# Patient Record
Sex: FEMALE | Race: WHITE | NOT HISPANIC OR LATINO | Employment: OTHER | URBAN - METROPOLITAN AREA
[De-identification: names, ages, dates, MRNs, and addresses within clinical notes are randomized per-mention and may not be internally consistent; named-entity substitution may affect disease eponyms.]

---

## 2017-01-09 ENCOUNTER — OFFICE VISIT - RIVER FALLS (OUTPATIENT)
Dept: FAMILY MEDICINE | Facility: CLINIC | Age: 72
End: 2017-01-09

## 2017-01-09 ASSESSMENT — MIFFLIN-ST. JEOR: SCORE: 1206.08

## 2017-03-01 ENCOUNTER — OFFICE VISIT - RIVER FALLS (OUTPATIENT)
Dept: FAMILY MEDICINE | Facility: CLINIC | Age: 72
End: 2017-03-01

## 2017-04-26 ENCOUNTER — COMMUNICATION - RIVER FALLS (OUTPATIENT)
Dept: FAMILY MEDICINE | Facility: CLINIC | Age: 72
End: 2017-04-26

## 2017-04-26 ENCOUNTER — OFFICE VISIT - RIVER FALLS (OUTPATIENT)
Dept: FAMILY MEDICINE | Facility: CLINIC | Age: 72
End: 2017-04-26

## 2017-04-27 LAB — HBA1C MFR BLD: 8.7 %

## 2017-05-02 ENCOUNTER — OFFICE VISIT - RIVER FALLS (OUTPATIENT)
Dept: FAMILY MEDICINE | Facility: CLINIC | Age: 72
End: 2017-05-02

## 2017-05-19 ENCOUNTER — OFFICE VISIT - RIVER FALLS (OUTPATIENT)
Dept: FAMILY MEDICINE | Facility: CLINIC | Age: 72
End: 2017-05-19

## 2017-09-19 ENCOUNTER — AMBULATORY - RIVER FALLS (OUTPATIENT)
Dept: FAMILY MEDICINE | Facility: CLINIC | Age: 72
End: 2017-09-19

## 2017-09-20 LAB — HBA1C MFR BLD: 8.2 %

## 2017-09-26 ENCOUNTER — OFFICE VISIT - RIVER FALLS (OUTPATIENT)
Dept: FAMILY MEDICINE | Facility: CLINIC | Age: 72
End: 2017-09-26

## 2018-02-06 ENCOUNTER — AMBULATORY - RIVER FALLS (OUTPATIENT)
Dept: FAMILY MEDICINE | Facility: CLINIC | Age: 73
End: 2018-02-06

## 2018-02-15 ENCOUNTER — OFFICE VISIT - RIVER FALLS (OUTPATIENT)
Dept: FAMILY MEDICINE | Facility: CLINIC | Age: 73
End: 2018-02-15

## 2018-02-28 ENCOUNTER — OFFICE VISIT - RIVER FALLS (OUTPATIENT)
Dept: FAMILY MEDICINE | Facility: CLINIC | Age: 73
End: 2018-02-28

## 2018-02-28 ENCOUNTER — AMBULATORY - RIVER FALLS (OUTPATIENT)
Dept: FAMILY MEDICINE | Facility: CLINIC | Age: 73
End: 2018-02-28

## 2018-03-12 ENCOUNTER — OFFICE VISIT - RIVER FALLS (OUTPATIENT)
Dept: FAMILY MEDICINE | Facility: CLINIC | Age: 73
End: 2018-03-12

## 2018-03-12 ASSESSMENT — MIFFLIN-ST. JEOR: SCORE: 1202.61

## 2018-03-13 LAB
CREAT SERPL-MCNC: 1.57 MG/DL (ref 0.6–0.93)
GLUCOSE BLD-MCNC: 183 MG/DL (ref 65–99)

## 2018-06-13 ENCOUNTER — AMBULATORY - RIVER FALLS (OUTPATIENT)
Dept: FAMILY MEDICINE | Facility: CLINIC | Age: 73
End: 2018-06-13

## 2018-06-14 LAB — HBA1C MFR BLD: 8 %

## 2018-06-20 ENCOUNTER — OFFICE VISIT - RIVER FALLS (OUTPATIENT)
Dept: FAMILY MEDICINE | Facility: CLINIC | Age: 73
End: 2018-06-20

## 2018-07-10 ENCOUNTER — OFFICE VISIT - RIVER FALLS (OUTPATIENT)
Dept: FAMILY MEDICINE | Facility: CLINIC | Age: 73
End: 2018-07-10

## 2018-07-18 ENCOUNTER — OFFICE VISIT - RIVER FALLS (OUTPATIENT)
Dept: FAMILY MEDICINE | Facility: CLINIC | Age: 73
End: 2018-07-18

## 2018-07-18 ASSESSMENT — MIFFLIN-ST. JEOR: SCORE: 1200.63

## 2018-07-19 ENCOUNTER — OFFICE VISIT - RIVER FALLS (OUTPATIENT)
Dept: FAMILY MEDICINE | Facility: CLINIC | Age: 73
End: 2018-07-19

## 2018-08-02 ENCOUNTER — OFFICE VISIT - RIVER FALLS (OUTPATIENT)
Dept: FAMILY MEDICINE | Facility: CLINIC | Age: 73
End: 2018-08-02

## 2018-09-06 ENCOUNTER — OFFICE VISIT - RIVER FALLS (OUTPATIENT)
Dept: FAMILY MEDICINE | Facility: CLINIC | Age: 73
End: 2018-09-06

## 2018-10-08 ENCOUNTER — AMBULATORY - RIVER FALLS (OUTPATIENT)
Dept: FAMILY MEDICINE | Facility: CLINIC | Age: 73
End: 2018-10-08

## 2018-10-09 LAB — HBA1C MFR BLD: 7.9 %

## 2018-10-18 ENCOUNTER — OFFICE VISIT - RIVER FALLS (OUTPATIENT)
Dept: FAMILY MEDICINE | Facility: CLINIC | Age: 73
End: 2018-10-18

## 2018-11-23 ENCOUNTER — OFFICE VISIT - RIVER FALLS (OUTPATIENT)
Dept: FAMILY MEDICINE | Facility: CLINIC | Age: 73
End: 2018-11-23

## 2018-11-23 ASSESSMENT — MIFFLIN-ST. JEOR: SCORE: 1208.8

## 2019-01-15 ENCOUNTER — AMBULATORY - RIVER FALLS (OUTPATIENT)
Dept: FAMILY MEDICINE | Facility: CLINIC | Age: 74
End: 2019-01-15

## 2019-01-16 LAB
BUN SERPL-MCNC: 34 MG/DL (ref 7–25)
BUN/CREAT RATIO - HISTORICAL: 20 (ref 6–22)
CALCIUM SERPL-MCNC: 9.8 MG/DL (ref 8.6–10.4)
CHLORIDE BLD-SCNC: 108 MMOL/L (ref 98–110)
CHOLEST SERPL-MCNC: 181 MG/DL
CHOLEST/HDLC SERPL: 2.4 {RATIO}
CO2 SERPL-SCNC: 26 MMOL/L (ref 20–32)
CREAT SERPL-MCNC: 1.66 MG/DL (ref 0.6–0.93)
CREAT UR-MCNC: 143 MG/DL (ref 20–275)
EGFRCR SERPLBLD CKD-EPI 2021: 30 ML/MIN/1.73M2
GLUCOSE BLD-MCNC: 108 MG/DL (ref 65–99)
HBA1C MFR BLD: 8.3 %
HDLC SERPL-MCNC: 74 MG/DL
LDLC SERPL CALC-MCNC: 87 MG/DL
MICROALBUMIN UR-MCNC: 3.4 MG/DL
MICROALBUMIN/CREAT UR: 24 MG/G{CREAT}
NONHDLC SERPL-MCNC: 107 MG/DL
POTASSIUM BLD-SCNC: 4.6 MMOL/L (ref 3.5–5.3)
SODIUM SERPL-SCNC: 141 MMOL/L (ref 135–146)
TRIGL SERPL-MCNC: 105 MG/DL

## 2019-01-17 ENCOUNTER — OFFICE VISIT - RIVER FALLS (OUTPATIENT)
Dept: FAMILY MEDICINE | Facility: CLINIC | Age: 74
End: 2019-01-17

## 2019-01-22 ENCOUNTER — OFFICE VISIT - RIVER FALLS (OUTPATIENT)
Dept: FAMILY MEDICINE | Facility: CLINIC | Age: 74
End: 2019-01-22

## 2019-05-15 ENCOUNTER — AMBULATORY - RIVER FALLS (OUTPATIENT)
Dept: FAMILY MEDICINE | Facility: CLINIC | Age: 74
End: 2019-05-15

## 2019-05-16 LAB — HBA1C MFR BLD: 8.5 %

## 2019-05-28 ENCOUNTER — OFFICE VISIT - RIVER FALLS (OUTPATIENT)
Dept: FAMILY MEDICINE | Facility: CLINIC | Age: 74
End: 2019-05-28

## 2019-07-05 ENCOUNTER — COMMUNICATION - RIVER FALLS (OUTPATIENT)
Dept: FAMILY MEDICINE | Facility: CLINIC | Age: 74
End: 2019-07-05

## 2019-07-18 ENCOUNTER — OFFICE VISIT - RIVER FALLS (OUTPATIENT)
Dept: FAMILY MEDICINE | Facility: CLINIC | Age: 74
End: 2019-07-18

## 2019-07-26 ENCOUNTER — COMMUNICATION - RIVER FALLS (OUTPATIENT)
Dept: FAMILY MEDICINE | Facility: CLINIC | Age: 74
End: 2019-07-26

## 2019-09-13 ENCOUNTER — AMBULATORY - RIVER FALLS (OUTPATIENT)
Dept: FAMILY MEDICINE | Facility: CLINIC | Age: 74
End: 2019-09-13

## 2019-09-14 LAB — HBA1C MFR BLD: 8.1 %

## 2019-09-18 ENCOUNTER — OFFICE VISIT - RIVER FALLS (OUTPATIENT)
Dept: FAMILY MEDICINE | Facility: CLINIC | Age: 74
End: 2019-09-18

## 2019-09-18 ASSESSMENT — MIFFLIN-ST. JEOR: SCORE: 1176.59

## 2019-10-08 ENCOUNTER — OFFICE VISIT - RIVER FALLS (OUTPATIENT)
Dept: FAMILY MEDICINE | Facility: CLINIC | Age: 74
End: 2019-10-08

## 2019-10-21 ENCOUNTER — COMMUNICATION - RIVER FALLS (OUTPATIENT)
Dept: FAMILY MEDICINE | Facility: CLINIC | Age: 74
End: 2019-10-21

## 2019-12-10 ENCOUNTER — OFFICE VISIT - RIVER FALLS (OUTPATIENT)
Dept: FAMILY MEDICINE | Facility: CLINIC | Age: 74
End: 2019-12-10

## 2020-01-16 ENCOUNTER — OFFICE VISIT - RIVER FALLS (OUTPATIENT)
Dept: FAMILY MEDICINE | Facility: CLINIC | Age: 75
End: 2020-01-16

## 2020-02-04 ENCOUNTER — AMBULATORY - RIVER FALLS (OUTPATIENT)
Dept: FAMILY MEDICINE | Facility: CLINIC | Age: 75
End: 2020-02-04

## 2020-02-05 LAB — HBA1C MFR BLD: 9.6 %

## 2020-02-09 ENCOUNTER — COMMUNICATION - RIVER FALLS (OUTPATIENT)
Dept: FAMILY MEDICINE | Facility: CLINIC | Age: 75
End: 2020-02-09

## 2020-02-20 ENCOUNTER — OFFICE VISIT - RIVER FALLS (OUTPATIENT)
Dept: FAMILY MEDICINE | Facility: CLINIC | Age: 75
End: 2020-02-20

## 2020-02-20 ASSESSMENT — MIFFLIN-ST. JEOR: SCORE: 1192.92

## 2020-03-17 ENCOUNTER — OFFICE VISIT - RIVER FALLS (OUTPATIENT)
Dept: FAMILY MEDICINE | Facility: CLINIC | Age: 75
End: 2020-03-17

## 2020-03-17 ASSESSMENT — MIFFLIN-ST. JEOR: SCORE: 1187.48

## 2020-07-16 ENCOUNTER — AMBULATORY - RIVER FALLS (OUTPATIENT)
Dept: FAMILY MEDICINE | Facility: CLINIC | Age: 75
End: 2020-07-16

## 2020-07-17 LAB
BUN SERPL-MCNC: 24 MG/DL (ref 7–25)
BUN/CREAT RATIO - HISTORICAL: 16 (ref 6–22)
CALCIUM SERPL-MCNC: 10.1 MG/DL (ref 8.6–10.4)
CHLORIDE BLD-SCNC: 107 MMOL/L (ref 98–110)
CHOLEST SERPL-MCNC: 161 MG/DL
CHOLEST/HDLC SERPL: 2.5 {RATIO}
CO2 SERPL-SCNC: 28 MMOL/L (ref 20–32)
CREAT SERPL-MCNC: 1.47 MG/DL (ref 0.6–0.93)
CREAT UR-MCNC: 79 MG/DL (ref 20–275)
EGFRCR SERPLBLD CKD-EPI 2021: 34 ML/MIN/1.73M2
GLUCOSE BLD-MCNC: 96 MG/DL (ref 65–99)
HBA1C MFR BLD: 7.9 %
HDLC SERPL-MCNC: 64 MG/DL
LDLC SERPL CALC-MCNC: 80 MG/DL
MICROALBUMIN UR-MCNC: 2.4 MG/DL
MICROALBUMIN/CREAT UR: 30 MG/G{CREAT}
NONHDLC SERPL-MCNC: 97 MG/DL
POTASSIUM BLD-SCNC: 4.9 MMOL/L (ref 3.5–5.3)
SODIUM SERPL-SCNC: 141 MMOL/L (ref 135–146)
TRIGL SERPL-MCNC: 83 MG/DL

## 2020-07-22 ENCOUNTER — OFFICE VISIT - RIVER FALLS (OUTPATIENT)
Dept: FAMILY MEDICINE | Facility: CLINIC | Age: 75
End: 2020-07-22

## 2020-07-22 ASSESSMENT — MIFFLIN-ST. JEOR: SCORE: 1193.46

## 2021-01-26 ENCOUNTER — OFFICE VISIT - RIVER FALLS (OUTPATIENT)
Dept: FAMILY MEDICINE | Facility: CLINIC | Age: 76
End: 2021-01-26

## 2021-01-26 ENCOUNTER — COMMUNICATION - RIVER FALLS (OUTPATIENT)
Dept: FAMILY MEDICINE | Facility: CLINIC | Age: 76
End: 2021-01-26

## 2021-01-26 LAB
ANION GAP SERPL CALCULATED.3IONS-SCNC: 6 MMOL/L (ref 5–18)
BASOPHILS # BLD MANUAL: 0 10*3/UL
BASOPHILS NFR BLD MANUAL: 0.4 %
BUN SERPL-MCNC: 21 MG/DL (ref 8–25)
BUN/CREAT RATIO - HISTORICAL: 15 (ref 10–20)
CALCIUM SERPL-MCNC: 9.1 MG/DL (ref 8.5–10.5)
CHLORIDE BLD-SCNC: 111 MMOL/L (ref 98–110)
CO2 SERPL-SCNC: 26 MMOL/L (ref 21–31)
CREAT SERPL-MCNC: 1.43 MG/DL (ref 0.57–1.11)
EOSINOPHIL # BLD MANUAL: 0.3 10*3/UL
EOSINOPHIL NFR BLD MANUAL: 2.7 %
ERYTHROCYTE [DISTWIDTH] IN BLOOD BY AUTOMATED COUNT: 13.3 % (ref 11.5–15.5)
GFR ESTIMATE EXT - HISTORICAL: 36
GLUCOSE BLD-MCNC: 118 MG/DL (ref 65–100)
HCT VFR BLD AUTO: 31.8 % (ref 33–51)
HGB BLD-MCNC: 10.3 G/DL (ref 12–16)
LYMPHOCYTES # BLD MANUAL: 2.2 10*3/UL (ref 0.9–2.9)
LYMPHOCYTES NFR BLD MANUAL: 23.7 %
MCH RBC QN AUTO: 28.6 PG (ref 26–34)
MCHC RBC AUTO-ENTMCNC: 32.4 G/DL (ref 32–36)
MCV RBC AUTO: 88 FL (ref 80–100)
MONOCYTES # BLD MANUAL: 0.9 10*3/UL
MONOCYTES NFR BLD MANUAL: 9.6 %
NEUTROPHILS # BLD MANUAL: 5.9 10*3/UL (ref 1.7–7)
NEUTROPHILS NFR BLD MANUAL: 63.6 %
PLATELET # BLD AUTO: 213 10*3/UL (ref 140–440)
PMV BLD: 10.9 FL (ref 6.5–11)
POTASSIUM BLD-SCNC: 4.5 MMOL/L (ref 3.5–5)
RBC # BLD AUTO: 3.6 10*6/UL (ref 4–5.2)
SODIUM SERPL-SCNC: 143 MMOL/L (ref 135–145)
WBC # BLD AUTO: 9.3 10*3/UL (ref 4.5–11)

## 2021-01-28 LAB — SARS-COV-2 RNA RESP QL NAA+PROBE: NEGATIVE

## 2021-01-30 LAB
C REACTIVE PROTEIN LHE: 1.4 MG/L
PROCALCITONIN SERPL-MCNC: <0.1 NG/ML

## 2021-02-01 ENCOUNTER — COMMUNICATION - RIVER FALLS (OUTPATIENT)
Dept: FAMILY MEDICINE | Facility: CLINIC | Age: 76
End: 2021-02-01

## 2021-02-19 ENCOUNTER — OFFICE VISIT - RIVER FALLS (OUTPATIENT)
Dept: FAMILY MEDICINE | Facility: CLINIC | Age: 76
End: 2021-02-19

## 2021-02-20 LAB — SARS-COV-2 AB PNL SERPL IA: NEGATIVE

## 2021-02-23 ENCOUNTER — COMMUNICATION - RIVER FALLS (OUTPATIENT)
Dept: FAMILY MEDICINE | Facility: CLINIC | Age: 76
End: 2021-02-23

## 2021-03-23 ENCOUNTER — OFFICE VISIT - RIVER FALLS (OUTPATIENT)
Dept: FAMILY MEDICINE | Facility: CLINIC | Age: 76
End: 2021-03-23

## 2021-08-07 ENCOUNTER — AMBULATORY - RIVER FALLS (OUTPATIENT)
Dept: FAMILY MEDICINE | Facility: CLINIC | Age: 76
End: 2021-08-07

## 2021-08-08 LAB
CHOLEST SERPL-MCNC: 172 MG/DL
CHOLEST/HDLC SERPL: 2.9 {RATIO}
CREAT UR-MCNC: 121 MG/DL (ref 20–275)
HBA1C MFR BLD: 8.2 %
HDLC SERPL-MCNC: 60 MG/DL
LDLC SERPL CALC-MCNC: 91 MG/DL
MICROALBUMIN UR-MCNC: 3.4 MG/DL
MICROALBUMIN/CREAT UR: 28 MG/G{CREAT}
NONHDLC SERPL-MCNC: 112 MG/DL
TRIGL SERPL-MCNC: 118 MG/DL

## 2021-08-09 ENCOUNTER — COMMUNICATION - RIVER FALLS (OUTPATIENT)
Dept: FAMILY MEDICINE | Facility: CLINIC | Age: 76
End: 2021-08-09

## 2021-08-11 ENCOUNTER — OFFICE VISIT - RIVER FALLS (OUTPATIENT)
Dept: FAMILY MEDICINE | Facility: CLINIC | Age: 76
End: 2021-08-11

## 2021-10-26 ENCOUNTER — OFFICE VISIT - RIVER FALLS (OUTPATIENT)
Dept: FAMILY MEDICINE | Facility: CLINIC | Age: 76
End: 2021-10-26

## 2022-02-11 VITALS
TEMPERATURE: 97.6 F | SYSTOLIC BLOOD PRESSURE: 126 MMHG | WEIGHT: 170.4 LBS | BODY MASS INDEX: 31.93 KG/M2 | HEART RATE: 50 BPM | DIASTOLIC BLOOD PRESSURE: 60 MMHG | OXYGEN SATURATION: 96 %

## 2022-02-11 VITALS
TEMPERATURE: 97.7 F | BODY MASS INDEX: 30.84 KG/M2 | SYSTOLIC BLOOD PRESSURE: 146 MMHG | HEIGHT: 62 IN | DIASTOLIC BLOOD PRESSURE: 64 MMHG | HEART RATE: 60 BPM | WEIGHT: 167.6 LBS

## 2022-02-11 VITALS
SYSTOLIC BLOOD PRESSURE: 126 MMHG | BODY MASS INDEX: 30.77 KG/M2 | TEMPERATURE: 97 F | DIASTOLIC BLOOD PRESSURE: 60 MMHG | SYSTOLIC BLOOD PRESSURE: 140 MMHG | HEART RATE: 60 BPM | WEIGHT: 166.6 LBS | DIASTOLIC BLOOD PRESSURE: 60 MMHG | HEIGHT: 62 IN | WEIGHT: 167.2 LBS | BODY MASS INDEX: 30.22 KG/M2

## 2022-02-11 VITALS
BODY MASS INDEX: 30.99 KG/M2 | BODY MASS INDEX: 29.83 KG/M2 | WEIGHT: 164.4 LBS | BODY MASS INDEX: 29.83 KG/M2 | TEMPERATURE: 97.9 F | DIASTOLIC BLOOD PRESSURE: 71 MMHG | SYSTOLIC BLOOD PRESSURE: 150 MMHG | SYSTOLIC BLOOD PRESSURE: 140 MMHG | HEIGHT: 62 IN | HEART RATE: 50 BPM | HEART RATE: 88 BPM | HEART RATE: 65 BPM | TEMPERATURE: 97.1 F | DIASTOLIC BLOOD PRESSURE: 60 MMHG | WEIGHT: 168.4 LBS | SYSTOLIC BLOOD PRESSURE: 139 MMHG | WEIGHT: 164.4 LBS | DIASTOLIC BLOOD PRESSURE: 61 MMHG | TEMPERATURE: 97.1 F

## 2022-02-11 VITALS
TEMPERATURE: 97.6 F | BODY MASS INDEX: 31.1 KG/M2 | WEIGHT: 169.4 LBS | SYSTOLIC BLOOD PRESSURE: 138 MMHG | WEIGHT: 169 LBS | OXYGEN SATURATION: 97 % | SYSTOLIC BLOOD PRESSURE: 138 MMHG | DIASTOLIC BLOOD PRESSURE: 76 MMHG | HEART RATE: 56 BPM | HEART RATE: 51 BPM | BODY MASS INDEX: 30.73 KG/M2 | TEMPERATURE: 97.6 F | DIASTOLIC BLOOD PRESSURE: 52 MMHG | HEIGHT: 62 IN

## 2022-02-11 VITALS
BODY MASS INDEX: 31.68 KG/M2 | TEMPERATURE: 96.9 F | BODY MASS INDEX: 31.91 KG/M2 | HEIGHT: 61 IN | HEART RATE: 48 BPM | SYSTOLIC BLOOD PRESSURE: 132 MMHG | WEIGHT: 167.8 LBS | DIASTOLIC BLOOD PRESSURE: 52 MMHG | WEIGHT: 169 LBS | HEIGHT: 61 IN | OXYGEN SATURATION: 96 %

## 2022-02-11 VITALS
HEART RATE: 60 BPM | DIASTOLIC BLOOD PRESSURE: 60 MMHG | BODY MASS INDEX: 31.23 KG/M2 | SYSTOLIC BLOOD PRESSURE: 130 MMHG | HEIGHT: 61 IN | TEMPERATURE: 97.8 F | WEIGHT: 165.4 LBS

## 2022-02-11 VITALS
SYSTOLIC BLOOD PRESSURE: 134 MMHG | WEIGHT: 172.4 LBS | BODY MASS INDEX: 32.31 KG/M2 | DIASTOLIC BLOOD PRESSURE: 60 MMHG | HEART RATE: 59 BPM | TEMPERATURE: 96.3 F | OXYGEN SATURATION: 94 %

## 2022-02-11 VITALS
DIASTOLIC BLOOD PRESSURE: 60 MMHG | SYSTOLIC BLOOD PRESSURE: 122 MMHG | HEART RATE: 67 BPM | TEMPERATURE: 97.6 F | SYSTOLIC BLOOD PRESSURE: 136 MMHG | OXYGEN SATURATION: 95 % | BODY MASS INDEX: 29.97 KG/M2 | WEIGHT: 165.2 LBS | BODY MASS INDEX: 29.86 KG/M2 | TEMPERATURE: 97.4 F | DIASTOLIC BLOOD PRESSURE: 70 MMHG | HEART RATE: 60 BPM | WEIGHT: 164.6 LBS

## 2022-02-11 VITALS
WEIGHT: 170.6 LBS | DIASTOLIC BLOOD PRESSURE: 60 MMHG | SYSTOLIC BLOOD PRESSURE: 138 MMHG | TEMPERATURE: 97 F | HEART RATE: 60 BPM

## 2022-02-11 VITALS — DIASTOLIC BLOOD PRESSURE: 60 MMHG | TEMPERATURE: 97.3 F | HEART RATE: 56 BPM | SYSTOLIC BLOOD PRESSURE: 122 MMHG

## 2022-02-11 VITALS
TEMPERATURE: 96.1 F | DIASTOLIC BLOOD PRESSURE: 56 MMHG | HEART RATE: 56 BPM | BODY MASS INDEX: 30.41 KG/M2 | SYSTOLIC BLOOD PRESSURE: 136 MMHG | WEIGHT: 167.6 LBS

## 2022-02-11 VITALS
BODY MASS INDEX: 31.35 KG/M2 | DIASTOLIC BLOOD PRESSURE: 80 MMHG | HEART RATE: 64 BPM | SYSTOLIC BLOOD PRESSURE: 151 MMHG | WEIGHT: 167.3 LBS | TEMPERATURE: 97.5 F

## 2022-02-11 VITALS
DIASTOLIC BLOOD PRESSURE: 60 MMHG | SYSTOLIC BLOOD PRESSURE: 126 MMHG | TEMPERATURE: 97.3 F | BODY MASS INDEX: 30.83 KG/M2 | WEIGHT: 170 LBS | HEART RATE: 60 BPM

## 2022-02-11 VITALS
WEIGHT: 169.12 LBS | BODY MASS INDEX: 31.93 KG/M2 | HEART RATE: 56 BPM | DIASTOLIC BLOOD PRESSURE: 64 MMHG | TEMPERATURE: 96 F | HEIGHT: 61 IN | SYSTOLIC BLOOD PRESSURE: 136 MMHG

## 2022-02-12 ENCOUNTER — AMBULATORY - RIVER FALLS (OUTPATIENT)
Dept: FAMILY MEDICINE | Facility: CLINIC | Age: 77
End: 2022-02-12
Payer: MEDICARE

## 2022-02-13 LAB
BUN SERPL-MCNC: 29 MG/DL (ref 7–25)
BUN/CREAT RATIO - HISTORICAL: 16 (ref 6–22)
CALCIUM SERPL-MCNC: 9.8 MG/DL (ref 8.6–10.4)
CHLORIDE BLD-SCNC: 106 MMOL/L (ref 98–110)
CO2 SERPL-SCNC: 28 MMOL/L (ref 20–32)
CREAT SERPL-MCNC: 1.78 MG/DL (ref 0.6–0.93)
EGFRCR SERPLBLD CKD-EPI 2021: 27 ML/MIN/1.73M2
ERYTHROCYTE [DISTWIDTH] IN BLOOD BY AUTOMATED COUNT: 12.3 % (ref 11–15)
GLUCOSE BLD-MCNC: 153 MG/DL (ref 65–99)
HBA1C MFR BLD: 8.2 %
HCT VFR BLD AUTO: 34.3 % (ref 35–45)
HGB BLD-MCNC: 11.1 GM/DL (ref 11.7–15.5)
MCH RBC QN AUTO: 28 PG (ref 27–33)
MCHC RBC AUTO-ENTMCNC: 32.4 GM/DL (ref 32–36)
MCV RBC AUTO: 86.4 FL (ref 80–100)
PLATELET # BLD AUTO: 236 10*3/UL (ref 140–400)
PMV BLD: 11.8 FL (ref 7.5–12.5)
POTASSIUM BLD-SCNC: 4.6 MMOL/L (ref 3.5–5.3)
RBC # BLD AUTO: 3.97 10*6/UL (ref 3.8–5.1)
SODIUM SERPL-SCNC: 140 MMOL/L (ref 135–146)
WBC # BLD AUTO: 7.6 10*3/UL (ref 3.8–10.8)

## 2022-02-16 ENCOUNTER — COMMUNICATION - RIVER FALLS (OUTPATIENT)
Dept: FAMILY MEDICINE | Facility: CLINIC | Age: 77
End: 2022-02-16
Payer: MEDICARE

## 2022-02-16 NOTE — NURSING NOTE
Comprehensive Intake Entered On:  1/26/2021 1:49 PM CST    Performed On:  1/26/2021 1:45 PM CST by Paula Concepcion CMA               Summary   Chief Complaint :   right ear pain, pains right side of head and into her neck/glands - painful to touch, fatigued, SOB, sl cough x 1/18    Weight Measured :   172.4 lb(Converted to: 172 lb 6 oz, 78.199 kg)    Systolic Blood Pressure :   134 mmHg (HI)    Diastolic Blood Pressure :   60 mmHg   Mean Arterial Pressure :   85 mmHg   Peripheral Pulse Rate :   59 bpm (LOW)    Temperature Tympanic :   96.3 DegF(Converted to: 35.7 DegC)  (LOW)    Oxygen Saturation :   94 %   Race :      Languages :   English   Paula Concepcion CMA - 1/26/2021 1:45 PM CST   Health Status   Allergies Verified? :   Yes   Medication History Verified? :   Yes   Medical History Verified? :   Yes   Pre-Visit Planning Status :   Completed   Tobacco Use? :   Former smoker   Paula Concepcion CMA - 1/26/2021 1:45 PM CST   ID Risk Screen   Recent Travel History :   No recent travel   Family Member Travel History :   No recent travel   Other Exposure to Infectious Disease :   Unknown   COVID-19 Testing Status :   No COVID-19 test performed   Paula Concepcion CMA - 1/26/2021 1:45 PM CST   Social History   Social History   (As Of: 1/26/2021 1:49:46 PM CST)   Alcohol:        Never   (Last Updated: 1/24/2019 5:51:28 PM CST by Kathryn Worrell)          Tobacco:        Past   (Last Updated: 4/21/2010 2:07:14 PM CDT by Hoda Fortune CMA)    Comments:  9/25/2012 1:41 PM - Brooke Barraza: Quit in 2000   (Last Updated: 9/25/2012 1:41:36 PM CDT by Brooke Barraza)   Former smoker, quit more than 30 days ago   (Last Updated: 1/26/2021 1:45:59 PM CST by Paula Concepcion CMA)          Electronic Cigarette/Vaping:        Electronic Cigarette Use: Never.   (Last Updated: 1/26/2021 1:46:04 PM CST by Paula Concepcion CMA)          Employment/School:        Retired, Work/School description: [employer].   (Last Updated: 7/2/2010  10:15:57 AM CDT by Joseluis STINSON, Jo

## 2022-02-16 NOTE — PROGRESS NOTES
Patient:   ALEJANDRA HERRERA            MRN: [id number]            FIN: [id number]               Age:   73 years     Sex:  Female     :  1945   Associated Diagnoses:   Obesity; Diabetes Mellitus, Type 2; Background Diabetic Retinopathy; Arteriosclerotic Heart Disease (ASHD); Hyperlipemia   Author:   Edgardo Luong MD      Visit Information      Date of Service: 2019 02:17 pm  Performing Location: [facility]  Encounter#: [id number]      Primary Care Provider (PCP):  Edgardo Luong MD, NPI# [id number]      Referring Provider:  Edgardo Luong MD# [id number]      Chief Complaint   2019 2:26 PM CST    DM check              Additional Information:No additional information recorded during visit.   Chief complaint and symptoms as noted above and confirmed with patient      History of Present Illness   2014: Alejandra presents to clinic to [facility] care, previously followed by JEB.  She has a history of type 2 diabetes historically with uncontrolled hyperglycemia with hemoglobin A1c consistently above 9%, until approximately 1-1/2 years ago she was transitioned to Lantus basal and Lispro bolus insulin dosing with significant improvement in her glycemic control. Currently taking Lantus 40 units nightly, Humalog 15 units 3 times a day before meals.  She has a history of retinopathy, no known peripheral neuropathy or nephropathy.  She has remote history of atherosclerotic coronary artery disease, undergoing angioplasty in the mid 90s.  Denies any anginal equivalent symptoms.  She has never undergone screening for colorectal cancer. Labs reviewed with her.     2019:  Presents for general diabetic follow-up.  Overall doing well.  No specific complaints or concerns.  Tolerating insulin therapy without issues.  Denies any regular glycemia.         Review of Systems   Constitutional:  No fever, No chills.    Eye:  Negative except as documented in history of present illness.    Ear/Nose/Mouth/Throat:   Negative except as documented in history of present illness.    Respiratory:  No shortness of breath.    Cardiovascular:  No chest pain, No palpitations, No peripheral edema, No syncope.    Gastrointestinal:  No vomiting, No constipation, No heartburn, No abdominal pain.    Genitourinary:  No dysuria, No hematuria.    Hematology/Lymphatics:  Negative except as documented in history of present illness.    Endocrine:  No excessive thirst, No polyuria.    Immunologic:  No recurrent fevers.    Musculoskeletal:  Decreased range of motion, No back pain, No neck pain, No joint pain, No muscle pain.    Neurologic:  Alert and oriented X4, No numbness, No tingling.       Health Status   Allergies:    Allergic Reactions (Selected)  Severity Not Documented  Actos (Gi upset,edema)  Avalide (Angioedema)  Avandia (Breathing problem)  Avelox (Shaky)  Benadryl (Hives)  Glucophage (Gi sx)  Lisinopril (Angioedema)  Naprosyn (Hives)  Penicillin (Hives)  PredniSONE (Hives)  Sulfa drug (Hives and nausea and vomitting)  Tylenol (Feet swelling)  Zithromax (Breathing trouble)  Nonallergic Reactions (Selected)  Unknown  Hydrochlorothiazide-irbesartan (Angioedema)  Ibuprofen (No reactions were documented)  Peanuts (Itching)   Medications:  (Selected)   Prescriptions  Prescribed  1/2 cc insulin syringes (uses 2 daily) and One touch test strips and lancets: 1/2 cc insulin syringes (uses 2 daily) and One touch test strips and lancets, See Instructions, Instructions: uses 2 daily, Supply, # 100 EA, 5 Refill(s), Type: Maintenance, Pharmacy: [id number] #[id number], uses 2 daily  NovoLOG FlexPen 100 units/mL injectable solution: See Instructions, Instructions: INJECT 15 UNITS SUBCUTANEOUSLY THREE TIMES DAILY BEFORE MEALS, # 45 mL, 3 Refill(s), Type: Soft Stop, Pharmacy: [id number], keep on file, INJECT 15 UNITS SUBCUTANEOUSLY THREE TIMES DAILY BEFORE MEALS  ONE TOUCH ULTRA TEST STRIPS: ONE TOUCH ULTRA TEST STRIPS, See Instructions,  Instructions: TESTING qid - E11.9, Supply, # 400 EA, 3 Refill(s), Type: Maintenance, Pharmacy: [facility], keep on file and pt will notify when needed, TESTING qid - E11.9  One Touch Ultra Blood glucose meter: One Touch Ultra Blood glucose meter, See Instructions, Instructions: use as directed - E11.9, Supply, # 1 EA, 0 Refill(s), Type: Maintenance, Pharmacy: [facility], use as directed - E11.9  Toujeo SoloStar 300 units/mL subcutaneous solution: See Instructions, Instructions: INJECT 35 UNITS SUBCUTANEOUSLY ONCE DAILY. MAY INCREASE 1 UNIT UNTIL SUGAR IS BELOW 130, # 13.5 mL, 3 Refill(s), Type: Soft Stop, Pharmacy: [place] [id number], INJECT 35 UNITS SUBCUTANEOUSLY ONCE DAILY. MAY INCREASE...  amLODIPine 5 mg oral tablet: = 1 tab(s), PO, Daily, # 90 tab(s), 3 Refill(s), Type: Maintenance, Pharmacy: [facility] #[id number], keep on file and pt will notify when needed, 1 tab(s) Oral daily  aspirin 325 mg oral tablet: 1 tab(s) ( 325 mg ), PO, Daily, # 30 tab(s), 0 Refill(s), Type: Maintenance, OTC (Rx)  atenolol-chlorthalidone 50 mg-25 mg oral tablet: 0.5 tab, PO, Daily, # 45 tab(s), 3 Refill(s), Type: Maintenance, Pharmacy: [facility] #[id number], keep on file and pt will notify when needed, 0.5 tab Oral daily  atorvastatin 40 mg oral tablet: = 1 tab(s) ( 40 mg ), po, daily, # 90 tab(s), 3 Refill(s), Type: Maintenance, Pharmacy: [facility] #[id number], keep on file and pt will notify when needed, 1 tab(s) Oral daily  nitroglycerin 0.4 mg sublingual tablet: = 1 tab(s), SL, q5min, PRN: for chest pain, # 25 tab(s), 1 Refill(s), Type: Maintenance, Pharmacy: [facility] #[id number], keep on hold and pt will notify when needed, 1 tab(s) SL q5 min,PRN:for chest pain  pantoprazole 40 mg oral delayed release tablet: = 1 tab(s) ( 40 mg ), po, daily, # 90 tab(s), 3 Refill(s), Type: Maintenance, Pharmacy: [facility] #[id number], keep on file and pt will notify when needed, 1 tab(s) Oral daily  short insulin pen  needles: short insulin pen needles, See Instructions, Instructions: insulin shots 4x/day, Supply, # 400 EA, 3 Refill(s), Type: Maintenance, Pharmacy: [facility] #[id number], keep on hold and pt will notify when needed, insulin shots 4x/day,    Medications          *denotes recorded medication          short insulin pen needles: See Instructions, insulin shots 4x/day, 400 EA, 3 Refill(s).          ONE TOUCH ULTRA TEST STRIPS: See Instructions, TESTING qid - E11.9, 400 EA, 3 Refill(s).          1/2 cc insulin syringes (uses 2 daily) and One touch test strips and lancets: See Instructions, uses 2 daily, 100 EA.          One Touch Ultra Blood glucose meter: See Instructions, use as directed - E11.9, 1 EA, 0 Refill(s).          amLODIPine 5 mg oral tablet: 1 tab(s), PO, Daily, 90 tab(s), 3 Refill(s).          aspirin 325 mg oral tablet: 325 mg, 1 tab(s), PO, Daily, 30 tab(s).          atenolol-chlorthalidone 50 mg-25 mg oral tablet: 0.5 tab, PO, Daily, 45 tab(s), 3 Refill(s).          atorvastatin 40 mg oral tablet: 40 mg, 1 tab(s), po, daily, 90 tab(s), 3 Refill(s).          NovoLOG FlexPen 100 units/mL injectable solution: See Instructions, INJECT 15 UNITS SUBCUTANEOUSLY THREE TIMES DAILY BEFORE MEALS, 45 mL, 3 Refill(s).          Toujeo SoloStar 300 units/mL subcutaneous solution: See Instructions, INJECT 35 UNITS SUBCUTANEOUSLY ONCE DAILY. MAY INCREASE 1 UNIT UNTIL SUGAR IS BELOW 130, 13.5 mL, 3 Refill(s).          nitroglycerin 0.4 mg sublingual tablet: 1 tab(s), SL, q5min, PRN: for chest pain, 25 tab(s), 1 Refill(s).          pantoprazole 40 mg oral delayed release tablet: 40 mg, 1 tab(s), po, daily, 90 tab(s), 3 Refill(s).     Problem list:    All Problems  Acute low back pain / SNOMED CT 601601843 / Confirmed  Arteriosclerotic heart disease (ASHD) / SNOMED CT 69927446 / Confirmed  GERD (gastroesophageal reflux disease) / SNOMED CT 356176839 / Confirmed  Glaucoma / SNOMED CT 66226116 / Confirmed  Hypertension /  SNOMED CT 4101636674 / Confirmed  Insulin long-term use / SNOMED CT 9533010241 / Confirmed  Hyperlipemia / SNOMED CT 480613008 / Confirmed  Background diabetic retinopathy / SNOMED CT 5704576749 / Confirmed  Obesity / SNOMED CT 4306484276 / Probable  Diabetes mellitus, type 2 / SNOMED CT 382481755 / Confirmed  Inactive: Tobacco user / ICD-9-.1  Resolved: *Hospitalized@[facility] - Acute low back pain, L4 nerve root impingement  Canceled: Long term current use of oral hypoglycemic drug / SNOMED CT 327712287  Canceled: Obese / ICD-9-.00      Histories   Past Medical History:    Active  Diabetes mellitus, type 2 (769175673): Onset in  at 51 years.  Comments:  2010 CST 3:00 PM CST Adwoa Concepcion CMAPaula  started Insulin 2002  Background diabetic retinopathy (4190308439)  Arteriosclerotic heart disease (ASHD) (33539392)  Comments:  2010 CST 3:02 PM CST Adwoa Concepcion CMA Paula  Coronary Angioplasty and Stents x 2    2010 CDT 2:14 PM CDT - Lew Huggins MD  &   GERD (gastroesophageal reflux disease) (986010556)  Hypertension (7241443750)  Hyperlipemia (953708158)  Acute low back pain (700711035)  Comments:  2012 CDT 10:46 AM CDT - Lew Huggins MD  L3-L4 disc with L4 nerve root impingement  Obesity (5445277231)  Glaucoma (50765307)  Comments:  2012 CDT 1:46 PM CDT - Brooke Barraza  Right eye  Resolved  *Hospitalized@[facility] - Acute low back pain, L4 nerve root impingement: Onset on 2012 at 66 years.  Resolved.   Family History:    Suicide  Father ()  Kidney stone  Son (Elliot)  Diabetes mellitus type II  Mother ()  Asthma  Sister (Shira)  Heart attack  Mother ()  Son (Terrence)  CABG - Coronary artery bypass graft  Son (Terrence)  Coronary heart disease  Mother ()     Procedure history:    Esophagogastroduodenoscopy (740897781) on 2017 at 71 Years.  Comments:  2/10/2017 12:02 PM Elliot Park MDory  Gastric ulcer  Coronary angiogram (09111199) in  the month of 4/2010 at 64 Years.  Comments:  7/2/2010 10:18 AM CDT - Lew Huggins MD  no intervention with stents  OD cataract with IOL in the month of 12/2006 at 61 Years.  glaucoma surgery OD in the month of 3/2006 at 60 Years.  coronary angioplasty with stents in 2000 at 55 Years.  Coronary angioplasty (46292306) in 1997 at 52 Years.  DEE - Total abdominal hysterectomy and bilateral salpingo-oophorectomy (8751902382) in 1975 at 30 Years.  Comments:  4/21/2010 1:59 PM DERRICKT - Hoda Fortune  benign fibroids  Cholecystectomy (35609984) in 1968 at 23 Years.  Appendectomy (068676315) in 1960 at 15 Years.  Tonsillectomy (768511864) in 1956 at 11 Years.  Dilatation and curettage (1102421676).  Comments:  4/21/2010 1:59 PM DERRICKT - Hoda Fortune  benign   Social History:        Alcohol Assessment            Never      Tobacco Assessment            Past                     Comments:                      09/25/2012 - Brooke Barraza                     Quit in 2000      Employment and Education Assessment            Retired, Work/School description: [employer].      Physical Examination   vital signs stable, as noted above   Vital Signs   1/22/2019 2:26 PM CST Temperature Tympanic 97.3 DegF  LOW    Peripheral Pulse Rate 60 bpm    Pulse Site Radial artery    Systolic Blood Pressure 126 mmHg    Diastolic Blood Pressure 60 mmHg    Mean Arterial Pressure 82 mmHg    BP Site Right arm      Measurements from flowsheet : Measurements   1/22/2019 2:26 PM CST    Weight Measured - Standard                170 lb     General:  Alert and oriented, No acute distress.    Eye:  Pupils are equal, round and reactive to light, Extraocular movements are intact, Normal conjunctiva.    HENT:  Normocephalic, Oral mucosa is moist.    Neck:  Supple, No carotid bruit, No jugular venous distention, No lymphadenopathy.    Respiratory:  Lungs are clear to auscultation, Respirations are non-labored.    Cardiovascular:  Normal rate, Regular rhythm, No  murmur, No edema.    Gastrointestinal:  Soft, Non-tender.    Musculoskeletal:  Normal strength, left with finger trigger finger.    Neurologic:  Alert, Oriented, Normal motor function, No focal deficits, Cranial Nerves II-XII are grossly intact.    Cognition and Speech:  Oriented, Speech clear and coherent, Functional cognition intact.    Psychiatric:  Appropriate mood & affect.       Review / Management   Results review:  Lab results   1/15/2019 8:15 AM CST Sodium Level 141 mmol/L    Potassium Level 4.6 mmol/L    Chloride Level 108 mmol/L    CO2 Level 26 mmol/L    Glucose Level 108 mg/dL  HI    BUN 34 mg/dL  HI    Creatinine 1.66 mg/dL  HI    BUN/Creat Ratio 20    eGFR 30 mL/min/1.73m2  LOW    eGFR African American 35 mL/min/1.73m2  LOW    Calcium Level 9.8 mg/dL    Hgb A1c 8.3  HI    Cholesterol 181 mg/dL    Non-    HDL 74 mg/dL    Chol/HDL Ratio 2.4    LDL 87    Triglyceride 105 mg/dL    U Microalbumin 3.4 mg/dL    Ur Creatinine 143 mg/dL    Ur Microalb/Creat Ratio 24   .       Impression and Plan   Diagnosis     Obesity (CMS09-MW E66.9).     Diabetes Mellitus, Type 2 (WPA49-JC E11.9).     Background Diabetic Retinopathy (KVM79-PH E11.311).     Arteriosclerotic Heart Disease (ASHD) (YEB80-OV I25.10).     Hyperlipemia (CIB98-RD E78.2).         .) CAD   - Lexiscan (12/2016) - no reversible defects   - previously intolerant to higher dose ISMN, she does have known atherostenotic disease involving LCx   - ASA, atenolol 25mg daily, atorvastatin 40mg QHS,    - lower threshold for PCI given historic intolerance to long-acting nitrates    .) GERD/esophagitis   - EGD in 12/2016 showing gastric ulcer - previous symptoms now resolved   - pantoprazole 40mg daily   - denies any NSAIDs    .) type II DM, uncontrolled  hypoglycemic medications: intolerant to metformin due to GI symptoms  insulin therapy: Toujeo 40 units, Humalog 12-15 units TID + SSI   - flowsheet for ICR 1:4, sensitivity 15 (~20 units q meal)   - could  consider future GLP1 agonist  last HbA1c: 8.3%   microvascular complications: retinopathy, mo microalbuminuria  macrovascular complications: CAD  ASA/GILDA/statin:  ASA 325mg daily, no microalbuminuira, atorvastatin 40mg    - previous myalgias on higher dose of statin    .) HTN, controlled  current antihypertensive regimen: atenolol/chlorthalidone 25/12.5mg, amlodipine 5mg,   regimen changes:  intolerance:  future titration/work-up plan: low threshold to change/add ACEI if development of microalbuminuria    .) Health maintenance   - declines any means of CRC screening   - DEXA '18: osteopenia   - prior DEE   - q2 yr mammography   - declines adult vaccinations except for pneumococcal series    RTC in 6 months

## 2022-02-16 NOTE — NURSING NOTE
Diabetes Eye Testing Entered On:  1/17/2020 8:31 AM CST    Performed On:  1/2/2020 8:31 AM CST by Monie Almazan CMA               Diabetes Eye Testing   Retinopathy Present TR :   No   Presence of Macular Edema TR :   Yes   Dilated Retinal Exam Date TR :   1/2/2020 CST   Monie Almazan CMA - 1/17/2020 8:31 AM CST

## 2022-02-16 NOTE — NURSING NOTE
Comprehensive Intake Entered On:  2/20/2020 1:03 PM CST    Performed On:  2/20/2020 12:57 PM CST by Prieto GARCIA, Korin               Summary   Chief Complaint :   DM check, review meds, needs refills. also c/o chest congestion/tightness, some cough, SOB at times xSaturday   Weight Measured :   169 lb(Converted to: 169 lb 0 oz, 76.66 kg)    Height Measured :   61.25 in(Converted to: 5 ft 1 in, 155.57 cm)    Body Mass Index :   31.67 kg/m2 (HI)    Body Surface Area :   1.82 m2   Systolic Blood Pressure :   132 mmHg (HI)    Diastolic Blood Pressure :   52 mmHg (LOW)    Mean Arterial Pressure :   79 mmHg   Peripheral Pulse Rate :   48 bpm (LOW)    BP Site :   Right arm   Pulse Site :   Radial artery   BP Method :   Manual   HR Method :   Manual   Temperature Tympanic :   96.9 DegF(Converted to: 36.1 DegC)  (LOW)    Oxygen Saturation :   96 %   Race :      Languages :   English   Prieto GARCIA, Korin - 2/20/2020 12:57 PM CST   Health Status   Allergies Verified? :   Yes   Medication History Verified? :   Yes   Medical History Verified? :   Yes   Pre-Visit Planning Status :   Completed   Tobacco Use? :   Former smoker   Prieto GARCIA, Korin - 2/20/2020 12:57 PM CST   Consents   Consent for Immunization Exchange :   Consent Granted   Consent for Immunizations to Providers :   Consent Granted   Prieto GARCIA Korin - 2/20/2020 12:57 PM CST   Meds / Allergies   (As Of: 2/20/2020 1:03:50 PM CST)   Allergies (Active)   Actos  Estimated Onset Date:   Unspecified ; Reactions:   GI upset,edema ; Created By:   Paula Concepcion; Reaction Status:   Active ; Substance:   Actos ; Updated By:   Paula Concepcion; Reviewed Date:   3/1/2017 4:47 PM CST      Avalide  Estimated Onset Date:   Unspecified ; Reactions:   angioedema ; Created By:   Paula Concepcion; Reaction Status:   Active ; Substance:   Avalide ; Updated By:   Paula Concepcion; Reviewed Date:   3/1/2017 4:47 PM CST      Avandia  Estimated Onset Date:   Unspecified ; Reactions:    breathing problem ; Created By:   Paula Concepcion; Reaction Status:   Active ; Substance:   Avandia ; Updated By:   Paula Concepcion; Reviewed Date:   3/1/2017 4:47 PM CST      Avelox  Estimated Onset Date:   Unspecified ; Reactions:   shaky ; Created By:   Paula Concepcion; Reaction Status:   Active ; Substance:   Avelox ; Updated By:   Paula Concepcion; Reviewed Date:   3/1/2017 4:47 PM CST      Benadryl  Estimated Onset Date:   Unspecified ; Reactions:   hives ; Created By:   Paula Concepcion; Reaction Status:   Active ; Substance:   Benadryl ; Updated By:   Paula Concepcion; Reviewed Date:   3/1/2017 4:47 PM CST      Glucophage  Estimated Onset Date:   Unspecified ; Reactions:   GI sx ; Created By:   Paula Concepcion; Reaction Status:   Active ; Substance:   Glucophage ; Updated By:   Paula Concepcion; Reviewed Date:   3/1/2017 4:47 PM CST      hydrochlorothiazide-irbesartan  Estimated Onset Date:   2/5/2017 ; Reactions:   Angioedema ; Created By:   Paula Concepcion CMA; Reaction Status:   Active ; Category:   Drug ; Substance:   hydrochlorothiazide-irbesartan ; Type:   <not entered> ; Severity:   Unknown ; Updated By:   Palua Concepcion CMA; Reviewed Date:   1/22/2019 4:55 PM CST      ibuprofen  Estimated Onset Date:   <not entered> 9/20/2008 ; Created By:   Paula Concepcion CMA; Reaction Status:   Active ; Category:   Drug ; Substance:   ibuprofen ; Type:   <not entered> ; Severity:   Unknown ; Updated By:   Paula Concepcion CMA; Reviewed Date:   1/22/2019 4:55 PM CST      lisinopril  Estimated Onset Date:   Unspecified ; Reactions:   angioedema ; Created By:   Paula Concepcion; Reaction Status:   Active ; Substance:   lisinopril ; Updated By:   Paula Concepcion; Reviewed Date:   3/1/2017 4:47 PM CST      Naprosyn  Estimated Onset Date:   Unspecified ; Reactions:   hives ; Created By:   Paula Concepcion; Reaction Status:   Active ; Substance:   Naprosyn ; Updated By:   Paula Concepcion; Reviewed Date:   3/1/2017 4:47 PM CST      Peanuts   Estimated Onset Date:   <not entered> 9/8/2014 ; Reactions:   Itching ; Created By:   Paula Concepcion CMA; Reaction Status:   Active ; Category:   <not entered> ; Substance:   Peanuts ; Type:   <not entered> ; Severity:   Unknown ; Updated By:   Paula Concepcion CMA; Reviewed Date:   1/22/2019 4:56 PM CST      penicillin  Estimated Onset Date:   Unspecified ; Reactions:   hives ; Created By:   Paula Concepcion; Reaction Status:   Active ; Substance:   penicillin ; Updated By:   Paula Concepcion; Reviewed Date:   3/1/2017 4:47 PM CST      predniSONE  Estimated Onset Date:   Unspecified ; Reactions:   hives ; Created By:   Paula Concepcion; Reaction Status:   Active ; Substance:   predniSONE ; Updated By:   Paula Concepcion; Reviewed Date:   3/1/2017 4:47 PM CST      sulfa drug  Estimated Onset Date:   Unspecified ; Reactions:   hives, nausea and vomitting ; Created By:   Paula Concepcion CMA; Reaction Status:   Active ; Substance:   sulfa drug ; Updated By:   Paula Concepcion CMA; Reviewed Date:   1/22/2019 4:57 PM CST      Tylenol  Estimated Onset Date:   Unspecified ; Reactions:   feet swelling ; Created By:   Paula Concepcion; Reaction Status:   Active ; Category:   Drug ; Substance:   Tylenol ; Type:   Allergy ; Updated By:   Paula Concepcion; Reviewed Date:   3/1/2017 4:47 PM CST      Zithromax  Estimated Onset Date:   Unspecified ; Reactions:   breathing trouble ; Created By:   Paula Concepcion; Reaction Status:   Active ; Substance:   Zithromax ; Updated By:   Paula Concepcion; Reviewed Date:   3/1/2017 4:47 PM CST        Medication List   (As Of: 2/20/2020 1:03:50 PM CST)   Prescription/Discharge Order    amLODIPine  :   amLODIPine ; Status:   Prescribed ; Ordered As Mnemonic:   amLODIPine 5 mg oral tablet ; Simple Display Line:   1 tab(s), Oral, daily, 90 tab(s), 0 Refill(s) ; Ordering Provider:   Edgardo Luong MD; Catalog Code:   amLODIPine ; Order Dt/Tm:   9/19/2019 1:02:30 PM CDT          aspirin  :   aspirin ; Status:   Prescribed ;  Ordered As Mnemonic:   aspirin 325 mg oral tablet ; Simple Display Line:   325 mg, 1 tab(s), PO, Daily, 30 tab(s) ; Ordering Provider:   Lew Huggins MD; Catalog Code:   aspirin ; Order Dt/Tm:   3/2/2012 12:07:13 PM CST          atenolol-chlorthalidone  :   atenolol-chlorthalidone ; Status:   Prescribed ; Ordered As Mnemonic:   atenolol-chlorthalidone 50 mg-25 mg oral tablet ; Simple Display Line:   0.5 tab(s), Oral, daily, 45 tab(s), 1 Refill(s) ; Ordering Provider:   Edgardo Luong MD; Catalog Code:   atenolol-chlorthalidone ; Order Dt/Tm:   9/19/2019 1:02:31 PM CDT          atorvastatin  :   atorvastatin ; Status:   Prescribed ; Ordered As Mnemonic:   atorvastatin 40 mg oral tablet ; Simple Display Line:   1 tab(s), Oral, daily, 90 tab(s), 1 Refill(s) ; Ordering Provider:   Edgardo Luong MD; Catalog Code:   atorvastatin ; Order Dt/Tm:   9/19/2019 1:02:32 PM CDT          insulin aspart  :   insulin aspart ; Status:   Prescribed ; Ordered As Mnemonic:   NovoLOG FlexPen 100 units/mL injectable solution ; Simple Display Line:   See Instructions, INJECT 15 UNITS SUBCUTANEOUSLY THREE TIMES DAILY BEFORE MEALS, 45 mL, 3 Refill(s) ; Ordering Provider:   Edgardo Luong MD; Catalog Code:   insulin aspart ; Order Dt/Tm:   9/19/2019 1:02:27 PM CDT          insulin glargine  :   insulin glargine ; Status:   Prescribed ; Ordered As Mnemonic:   Toujeo SoloStar 300 units/mL subcutaneous solution ; Simple Display Line:   See Instructions, INJECT 35 UNITS SUBCUTANEOUSLY ONCE DAILY. MAY INCREASE 1 UNIT UNTIL SUGAR IS BELOW 130, 46 mL, 3 Refill(s) ; Ordering Provider:   Edgardo Luong MD; Catalog Code:   insulin glargine ; Order Dt/Tm:   3/5/2019 10:57:39 AM CST          Miscellaneous Prescription  :   Miscellaneous Prescription ; Status:   Prescribed ; Ordered As Mnemonic:   ONE TOUCH ULTRA TEST STRIPS ; Simple Display Line:   See Instructions, TESTING qid - E11.9, 400 EA, 3 Refill(s) ; Ordering Provider:   Edgardo Luong MD; Catalog Code:    Miscellaneous Prescription ; Order Dt/Tm:   1/22/2019 2:33:04 PM CST          Miscellaneous Prescription  :   Miscellaneous Prescription ; Status:   Prescribed ; Ordered As Mnemonic:   short insulin pen needles ; Simple Display Line:   See Instructions, insulin shots 4x/day, 400 EA, 3 Refill(s) ; Ordering Provider:   Edgardo Luong MD; Catalog Code:   Miscellaneous Prescription ; Order Dt/Tm:   6/21/2018 7:00:54 PM CDT          Miscellaneous Rx Supply  :   Miscellaneous Rx Supply ; Status:   Prescribed ; Ordered As Mnemonic:   1/2 cc insulin syringes (uses 2 daily) and One touch test strips and lancets ; Simple Display Line:   See Instructions, uses 2 daily, 100 EA ; Ordering Provider:   Edgardo Luong MD; Catalog Code:   Miscellaneous Rx Supply ; Order Dt/Tm:   12/29/2014 7:23:09 PM CST          Miscellaneous Rx Supply  :   Miscellaneous Rx Supply ; Status:   Prescribed ; Ordered As Mnemonic:   One Touch Ultra Blood glucose meter ; Simple Display Line:   See Instructions, use as directed - E11.9, 1 EA, 0 Refill(s) ; Ordering Provider:   Edgardo Luong MD; Catalog Code:   Miscellaneous Rx Supply ; Order Dt/Tm:   1/22/2019 2:31:20 PM CST          nitroglycerin  :   nitroglycerin ; Status:   Prescribed ; Ordered As Mnemonic:   nitroglycerin 0.4 mg sublingual tablet ; Simple Display Line:   1 tab(s), SL, q5min, PRN: for chest pain, 25 tab(s), 1 Refill(s) ; Ordering Provider:   Edgardo Luong MD; Catalog Code:   nitroglycerin ; Order Dt/Tm:   10/19/2018 2:32:54 PM CDT          pantoprazole  :   pantoprazole ; Status:   Prescribed ; Ordered As Mnemonic:   pantoprazole 40 mg oral delayed release tablet ; Simple Display Line:   40 mg, 1 tab(s), po, daily, 90 tab(s), 1 Refill(s) ; Ordering Provider:   Edgardo Luong MD; Catalog Code:   pantoprazole ; Order Dt/Tm:   9/19/2019 1:02:30 PM CDT          traMADol 50 mg oral tablet  :   traMADol 50 mg oral tablet ; Status:   Prescribed ; Ordered As Mnemonic:   traMADol 50 mg oral tablet ; Simple  Display Line:   See Instructions, TAKE 1 TABLET BY MOUTH EVERY 4 HOURS AS NEEDED FOR PAIN, 30 tab(s) ; Ordering Provider:   Edgardo Luong MD; Catalog Code:   traMADol ; Order Dt/Tm:   10/22/2019 2:41:20 PM CDT          traMADol  :   traMADol ; Status:   Deleted ; Ordered As Mnemonic:   traMADol 50 mg oral tablet ; Simple Display Line:   50 mg, 1 tab(s), PO, q4hr, for 7 day(s), PRN: for pain, 30 tab(s), 0 Refill(s) ; Ordering Provider:   Edgardo Luong MD; Catalog Code:   traMADol ; Order Dt/Tm:   5/29/2019 7:19:20 AM CDT            Social History   Social History   (As Of: 2/20/2020 1:03:50 PM CST)   Alcohol:        Never   (Last Updated: 1/24/2019 5:51:28 PM CST by Kathryn Worrell)          Tobacco:        Past   (Last Updated: 4/21/2010 2:07:14 PM CDT by Hoda Fortune CMA)    Comments:  9/25/2012 1:41 PM - Brooke Barraza: Quit in 2000   (Last Updated: 9/25/2012 1:41:36 PM CDT by Brooke Barraza)          Employment/School:        Retired, Work/School description: [employer].   (Last Updated: 7/2/2010 10:15:57 AM CDT by Lew Huggins MD)

## 2022-02-16 NOTE — TELEPHONE ENCOUNTER
---------------------  From: Donna Quiñones (Phone Messages Pool ([id number]_[facility]))   To: [facility] Message Pool ([id number]_[place] [facility] [place]);     Sent: 2/15/2021 11:04:11 AM CST  Subject: Labs      Phone Message    PCP: SOFIE    Time of Call: 1051    Phone Number: [phone] ok [facility]    Returned call at: n/a    Note: Patient wondering if she needs to have any additional labs done before her appt with [facility] 2/19.     Per chart, pt just had BMP done 1/26/21 and CBC.    Please advisept UTD on labs   contacted pt at [phone] and advised

## 2022-02-16 NOTE — PROGRESS NOTES
Patient:   ALEJANDRA HERRERA            MRN: [id number]            FIN: [id number]               Age:   74 years     Sex:  Female     :  1945   Associated Diagnoses:   None   Author:   Carmen Patricia      Doctor: Edgardo Luong  Patient Information  (Medicare and address information is on file)  Medicare HICN#: _  Address:_ City:_ State:_ Zip:_  Home Phone:_ Work Phone:_ Other Contact Phone:_    Diabetes self-management training (DSMT) and medical nutrition therapy (MNT) are individual and complementary services to improve diabetes care. For Medicare beneficiaries, both services can be ordered in the same year. Research indicates MNT combined with DSMT improves outcomes.    Diabetes Self-Managment Training (DSMT)  Medicare: 10 hours initial DSMT in 12 month period, plus 2 hours follow-up annually  *Check type of training services and number of hours requested:  _ Initial DSMT:  10 hours or _ no. hrs. requested  X Follow-up DSMT: X 2 hours or _ no. hrs. requested  _ Additional insulin training: _ no. hrs. requested    *Patients with special needs requiring individual DSMT  Check all special needs that apply:  _ Vision _ Hearing  _ Physical  _Cognitive Impairment  _ Language limitations X Other  No classes offered    *DSMT Content  X All ten content areas, as appropriate  _ Monitoring diabetes   _ Diabetes as disease process  _ Psychological adjustment _ Physical activity  _ Nutritional management  _ Goal setting, problem solving  _ Medications     _ Prevent, detect and treat acute complications  _ Preconception/pregnancy _ Prevent, detect and teat chronic complications     management or gestational     diabetes management    Medical Nutrition Therapy (MNT)  Medicare: 3 hours initial MNT in the first calendar year, plus two hours follow-up MNT annually. Additional MNT hours available for change in medical condition, treament and/or diagnosis.  *Check the type of MNT and/or number of additional hours requested:  _ Initial  MNT: X Annual follow-up MNT  _ Additional MTNservices in the same calendar year, per RD recommendations  _ no. additional hrs. requested    Please specify change in medical condition, treatment and/or diagnosis      *Diagnosis  Please send recent labs for patient eligibility & outcomes monitoring  _ Type 1 uncontrolled _ Type 1 controlled  X Type 2 uncontrolled _ Type 2 controlled  _ Gestational diabetes _ Other _    Complications/Comorbidities  Check all that apply:  X Hypertension   X Hyperlipidemia _ Stroke  _ Neuropathy   _ Nephropathy  _ PVD  _ Renal disease   X Retinopathy  _ CHD  _ Non-healing wound _ Pregnancy   X Obesity  _ Mental/affective disorder      _ Other _    Current Diabetes Medications  Specify type, dose and frequency  Oral: _  Insulin: Toujeo 36u at HS, Novolog 10-15u TID ac   Patient now uses: x Pen _ Needle _ Pump    Patient Behavior Goals/Plan of Care    To gradually lose weight and improve blood sugar control, while minimizing the risk for hypoglycemia and prevention of further complications related to uncontrolled diabetes     Signature and UPIN #: (UPIN and NPI are on file)  Group/Practice name, address and phone: [facility], [address] [place] [address]  [phone]

## 2022-02-16 NOTE — LETTER
(Inserted Image. Unable to display)     January 25, 2021      ALEJANDRA HERRERA   [place], [place] [id number]          Dear ALEJANDRA,      Thank you for selecting [facility] ([facility]viously [facility]) for your healthcare needs.    Our records indicate you are due for the following services:    Diabetic Exam ~ Please bring your glucose meter and/or your blood glucose diary to your appointment.    Non-Fasting Labs.    If you had your labs done at another facility or with Direct Access Lab Testing at [facility], please bring in a copy of the results to your next visit, mail a copy, or drop off a copy of your results to your Healthcare Provider.    (FYI   Regarding office visits: In some instances, a video visit or telephone visit may be offered as an option.)    You are due for lab work and an office visit; please schedule the lab appointment 1 week before the office visit.  This will assure all results are available to discuss with your Healthcare Provider during your visit.    **It is very helpful if you bring your medication bottles to your appointment.  This assures we have all of your current medications, including strength and dosing information, documented accurately in your medical record.    To schedule an appointment or if you have further questions, please contact your clinic at [phone].      Powered by [employer]    Sincerely,    Edgardo Luong MD

## 2022-02-16 NOTE — NURSING NOTE
Vital Signs Entered On:  2/19/2021 2:29 PM CST    Performed On:  2/19/2021 2:29 PM CST by Paula Concepcion CMA               Vital Signs   Peripheral Pulse Rate :   50 bpm (LOW)    Paula Concepcion CMA - 2/19/2021 2:29 PM CST

## 2022-02-16 NOTE — CARE COORDINATION
Pt appears on [facility] chronic disease panel as out of parameters for A1C.  Pt has RTC 12/2018 DM/labs with [facility], placed RTC 9/2018 A1C only

## 2022-02-16 NOTE — NURSING NOTE
Quick Intake Entered On:  3/17/2020 5:01 PM CDT    Performed On:  3/17/2020 5:01 PM CDT by Carmen Patricia               Summary   Weight Measured :   167.8 lb(Converted to: 167 lb 13 oz, 76.11 kg)    Height Measured :   61.25 in(Converted to: 5 ft 1 in, 155.57 cm)    Body Mass Index :   31.44 kg/m2 (HI)    Body Surface Area :   1.81 m2   Race :      Languages :   English   Carmen Patricia - 3/17/2020 5:01 PM CDT

## 2022-02-16 NOTE — TELEPHONE ENCOUNTER
Entered by Tanya Sandra CMA on March 15, 2021 10:39:54 AM CDT  ---------------------  From: Tanya Sandra CMA   To: [place] [id number]    Sent: 3/15/2021 10:39:54 AM CDT  Subject: Medication Management     ** Not Approved: rx sent 2/19/2021 #45ml, 1 refill **  insulin glargine (Toujeo SoloStar 300 UNIT/ML Subcutaneous Solution Pen-injector)  INJECT 35 UNITS SUBCUTANEOUSLY ONCE DAILY ,MAY  INCREASE  1  UNTI  UNTIL  SUGAR  IS  BELOW  130  Qty:  6 mL        Days Supply:  37        Refills:  0          Substitutions Allowed     Route To Pharmacy - [place] [id number]   Signed by Tanya Sandra CMA            ------------------------------------------  From: [place] [id number]  To: Edgardo Luong MD  Sent: March 12, 2021 10:22:43 AM CST  Subject: Medication Management  Due: March 12, 2021 3:04:47 PM CST     ** On Hold Pending Signature **     Dispensed Drug: insulin glargine (Toujeo SoloStar 300 units/mL subcutaneous solution), INJECT 35 UNITS SUBCUTANEOUSLY ONCE DAILY ,MAY INCREASE 1 UNTI UNTIL SUGAR IS BELOW 130  Quantity: 6 mL  Days Supply: 37  Refills: 0  Substitutions Allowed  Notes from Pharmacy:  ------------------------------------------

## 2022-02-16 NOTE — PROGRESS NOTES
Patient:   ALEJANDRA HERRERA            MRN: [id number]            FIN: [id number]               Age:   73 years     Sex:  Female     :  1945   Associated Diagnoses:   Obesity; Diabetes Mellitus, Type 2; Background Diabetic Retinopathy; Arteriosclerotic Heart Disease (ASHD); Hyperlipemia   Author:   Edgardo Luong MD      Visit Information      Date of Service: 10/18/2018 03:20 pm  Performing Location: [facility]  Encounter#: [id number]      Primary Care Provider (PCP):  Edgardo Luong MD    NPI# [id number]      Referring Provider:  Edgardo Luong MD# [id number]      Chief Complaint   10/18/2018 3:49 PM CDT   DM check              Additional Information:No additional information recorded during visit.   Chief complaint and symptoms as noted above and confirmed with patient      History of Present Illness   2014: Alejandra presents to clinic to [facility] care, previously followed by JEB.  She has a history of type 2 diabetes historically with uncontrolled hyperglycemia with hemoglobin A1c consistently above 9%, until approximately 1-1/2 years ago she was transitioned to Lantus basal and Lispro bolus insulin dosing with significant improvement in her glycemic control. Currently taking Lantus 40 units nightly, Humalog 15 units 3 times a day before meals.  She has a history of retinopathy, no known peripheral neuropathy or nephropathy.  She has remote history of atherosclerotic coronary artery disease, undergoing angioplasty in the mid 90s.  Denies any anginal equivalent symptoms.  She has never undergone screening for colorectal cancer. Labs reviewed with her.     10/18/2018: Alejandra returns for follow-up.  Overall doing well.  No specific complaints or concerns.  She does express interest in pursuing new meter specifically asking about th Freestyle Adry device.  Denies any hypoglycemia.  No recent insulin changes.  We reviewed her labs.  Overall glycemic control is remaining relatively stable.  No further  issues with chest pain.         Review of Systems   Constitutional:  No fever, No chills.    Eye:  Negative except as documented in history of present illness.    Ear/Nose/Mouth/Throat:  Negative except as documented in history of present illness.    Respiratory:  No shortness of breath.    Cardiovascular:  Chest pain, No palpitations, No peripheral edema, No syncope.    Gastrointestinal:  No vomiting, No constipation, No heartburn, No abdominal pain.    Genitourinary:  No dysuria, No hematuria.    Hematology/Lymphatics:  Negative except as documented in history of present illness.    Endocrine:  No excessive thirst, No polyuria.    Immunologic:  No recurrent fevers.    Musculoskeletal:  Decreased range of motion, No back pain, No neck pain, No joint pain, No muscle pain.    Neurologic:  Alert and oriented X4, No numbness, No tingling.       Health Status   Allergies:    Allergic Reactions (Selected)  Severity Not Documented  Actos (Gi upset,edema)  Avalide (Angioedema)  Avandia (Breathing problem)  Avelox (Shaky)  Benadryl (Hives)  Glucophage (Gi sx)  Lisinopril (Angioedema)  Naprosyn (Hives)  Penicillin (Hives)  PredniSONE (Hives)  Sulfa drug (Hives)  Tylenol (Feet swelling)  Zithromax (Breathing trouble)   Medications:  (Selected)   Prescriptions  Prescribed  1/2 cc insulin syringes (uses 2 daily) and One touch test strips and lancets: 1/2 cc insulin syringes (uses 2 daily) and One touch test strips and lancets, See Instructions, Instructions: uses 2 daily, Supply, # 100 EA, 5 Refill(s), Type: Maintenance, Pharmacy: [facility] #[id number], uses 2 daily  NovoLOG FlexPen 100 units/mL injectable solution: See Instructions, Instructions: INJECT 15 UNITS SUBCUTANEOUSLY THREE TIMES DAILY BEFORE MEALS, # 45 mL, 1 Refill(s), Type: Soft Stop, [id number], keep on file-can replace 1mo with 3mo refill if pt chooses, INJECT 15 UNITS SUBCUTAN...  ONE TOUCH ULTRA TEST STRIPS: ONE TOUCH ULTRA TEST STRIPS, See  Instructions, Instructions: TESTING qid - E11.9, Supply, # 400 EA, 3 Refill(s), Type: Maintenance, Pharmacy: [facility] #[id number], keep on file and pt will notify when needed  Toujeo SoloStar 300 units/mL subcutaneous solution: See Instructions, Instructions: INJECT 35 UNITS SUBCUTANEOUSLY ONCE DAILY. MAY INCREASE 1 UNIT UNTIL SUGAR IS BELOW 130, # 13.5 mL, 1 Refill(s), Type: Soft Stop, Pharmacy: [facility] #[id number], keep on file-can replace 1mo with 3mo refill if pt choos...  amLODIPine 5 mg oral tablet: 1 tab(s), PO, Daily, # 90 tab(s), 1 Refill(s), Type: Maintenance, Pharmacy: [facility] #[id number], keep on file and pt will notify when needed, 1 tab(s) po daily  aspirin 325 mg oral tablet: 1 tab(s) ( 325 mg ), PO, Daily, # 30 tab(s), 0 Refill(s), Type: Maintenance, OTC (Rx)  atenolol-chlorthalidone 50 mg-25 mg oral tablet: 0.5 tab, PO, Daily, # 45 tab(s), 1 Refill(s), Type: Maintenance, Pharmacy: [facility] #[id number], keep on file and pt will notify when needed, 0.5 tab po daily  atorvastatin 40 mg oral tablet: 1 tab(s) ( 40 mg ), po, daily, # 90 tab(s), 1 Refill(s), Type: Maintenance, Pharmacy: [facility] #[id number], keep on file and pt will notify when needed, 1 tab(s) po daily  nitroglycerin 0.4 mg sublingual tablet: 1 tab(s), SL, q5min, PRN: for chest pain, # 25 tab(s), 1 Refill(s), Type: Maintenance, Pharmacy: [facility] #[id number], keep on hold and pt will notify when needed, 1 tab(s) sl q5 min,PRN:for chest pain  pantoprazole 40 mg oral delayed release tablet: 1 tab(s) ( 40 mg ), po, daily, # 90 tab(s), 1 Refill(s), Type: Maintenance, Pharmacy: [facility] #[id number], keep on file and pt will notify when needed, 1 tab(s) po daily  short insulin pen needles: short insulin pen needles, See Instructions, Instructions: insulin shots 4x/day, Supply, # 400 EA, 3 Refill(s), Type: Maintenance, [facility]: [id number], keep on hold and pt will notify when needed, insulin shots 4x/day  traMADol 50 mg oral  tablet: 1 tab(s) ( 50 mg ), PO, q4hr, PRN: for pain, # 30 tab(s), 0 Refill(s), Type: Maintenance, Pharmacy: [facility] #[id number], 1 tab(s) po q4 hrs,x7 day(s),PRN:for pain   Problem list:    All Problems  Acute low back pain / SNOMED CT 108045581 / Confirmed  Arteriosclerotic heart disease (ASHD) / SNOMED CT 26147128 / Confirmed  GERD (gastroesophageal reflux disease) / SNOMED CT 922736457 / Confirmed  Glaucoma / SNOMED CT 55192849 / Confirmed  Hypertension / SNOMED CT 0821763400 / Confirmed  Insulin long-term use / SNOMED CT 6481980430 / Confirmed  Hyperlipemia / SNOMED CT 755421771 / Confirmed  Background diabetic retinopathy / SNOMED CT 6622551977 / Confirmed  Obesity / SNOMED CT 2503166602 / Probable  Diabetes mellitus, type 2 / SNOMED CT 149134764 / Confirmed  Inactive: Tobacco user / ICD-9-.1  Resolved: *Hospitalized@[facility] - Acute low back pain, L4 nerve root impingement  Canceled: Long term current use of oral hypoglycemic drug / SNOMED CT 239164749  Canceled: Obese / ICD-9-.00      Histories   Past Medical History:    Active  Diabetes mellitus, type 2 (602265178): Onset in 1996 at 51 years.  Comments:  1/29/2010 CST 3:00 PM CST - Jamey CARDENASPaula  started Insulin 2/2002  Background diabetic retinopathy (8215556835)  Arteriosclerotic heart disease (ASHD) (55846657)  Comments:  1/29/2010 CST 3:02 PM CST Adwoa Concepcion Paula CARDENAS  Coronary Angioplasty and Stents x 2    11/1/2010 CDT 2:14 PM CDT - Lew Huggins MD  1997& 2000  GERD (gastroesophageal reflux disease) (923620786)  Hypertension (4197352128)  Hyperlipemia (920362555)  Acute low back pain (711367500)  Comments:  6/27/2012 CDT 10:46 AM CDT - Lew Huggins MD  L3-L4 disc with L4 nerve root impingement  Obesity (8064641811)  Glaucoma (31449331)  Comments:  9/25/2012 CDT 1:46 PM CDT - Brooke Barraza  Right eye  Resolved  *Hospitalized@[facility] - Acute low back pain, L4 nerve root impingement: Onset on 6/16/2012 at 66 years.  Resolved.   Family  History:    Suicide  Father ()  Kidney stone  Son (Elliot)  Diabetes mellitus type II  Mother ()  Asthma  Sister (Shira)  Heart attack  Mother ()  Son (Terrence)  CABG - Coronary artery bypass graft  Son (Terrence)  Coronary heart disease  Mother ()     Procedure history:    Esophagogastroduodenoscopy (944997345) on 2017 at 71 Years.  Comments:  2/10/2017 12:02 PM - Vinayak Lei MD  Gastric ulcer  Coronary angiogram (29804625) in the month of 2010 at 64 Years.  Comments:  2010 10:18 AM - Lew Huggins MD  no intervention with stents  OD cataract with IOL in the month of 2006 at 61 Years.  glaucoma surgery OD in the month of 3/2006 at 60 Years.  coronary angioplasty with stents in  at 55 Years.  Coronary angioplasty (08169931) in  at 52 Years.  DEE - Total abdominal hysterectomy and bilateral salpingo-oophorectomy (3446247678) in  at 30 Years.  Comments:  2010 1:59 PM - Hoda Fortune  benign fibroids  Cholecystectomy (60233898) in  at 23 Years.  Appendectomy (537913463) in  at 15 Years.  Tonsillectomy (816735584) in  at 11 Years.  Dilatation and curettage (1026735274).  Comments:  2010 1:59 PM - Hoda Fortune  benign   Social History:        Alcohol Assessment            Never      Tobacco Assessment            Past                     Comments:                      2012 - Brooke Barraza                     Quit in       Employment and Education Assessment            Retired, Work/School description: [employer].        Physical Examination   vital signs stable, as noted above   Vital Signs   10/18/2018 3:49 PM CDT Temperature Tympanic 97.6 DegF  LOW    Peripheral Pulse Rate 56 bpm  LOW    Systolic Blood Pressure 138 mmHg  HI    Diastolic Blood Pressure 76 mmHg    Mean Arterial Pressure 97 mmHg      Measurements from flowsheet : Measurements   10/18/2018 3:49 PM CDT   Weight Measured - Standard                169.4 lb     General:   Alert and oriented, No acute distress.    Eye:  Pupils are equal, round and reactive to light, Extraocular movements are intact, Normal conjunctiva.    HENT:  Normocephalic, Oral mucosa is moist.    Neck:  Supple, No carotid bruit, No jugular venous distention, No lymphadenopathy.    Respiratory:  Lungs are clear to auscultation, Respirations are non-labored.    Cardiovascular:  Normal rate, Regular rhythm, No murmur, No edema.    Gastrointestinal:  Soft, Non-tender.    Musculoskeletal:  Normal strength, left with finger trigger finger.    Neurologic:  Alert, Oriented, Normal motor function, No focal deficits, Cranial Nerves II-XII are grossly intact.    Cognition and Speech:  Oriented, Speech clear and coherent, Functional cognition intact.    Psychiatric:  Appropriate mood & affect.       Review / Management   Results review:  Lab results: 10/8/2018 3:44 PM CDT    Hgb A1c                   7.9  HI  .       Impression and Plan   Diagnosis     Obesity (ELP90-OT E66.9).     Diabetes Mellitus, Type 2 (ELC34-RK E11.9).     Background Diabetic Retinopathy (NKU78-AF E11.311).     Arteriosclerotic Heart Disease (ASHD) (TSY00-AB I25.10).     Hyperlipemia (XZM93-OB E78.2).         .) CAD   - Lexiscan (12/2016) - no reversible defects   - previously intolerant to higher dose ISMN, she does have known atherostenotic disease involving LCx   - ASA, atenolol 25mg daily, atorvastatin 40mg QHS,    - lower threshold for PCI given historic intolerance to long-acting nitrates    .) GERD/esophagitis   - EGD in 12/2016 showing gastric ulcer - previous symptoms now resolved   - pantoprazole 40mg daily   - denies any NSAIDs    .) type II DM, controlled  hypoglycemic medications: intolerant to metformin due to GI symptoms  insulin therapy: Toujeo 40 units, Humalog 12-15 units TID + SSI   - flowsheet for ICR 1:4, sensitivity 15 (~20 units q meal)   - could consider future GLP1 agonist   - wants to trial Freestyle Adry  last HbA1c: 7.9%    microvascular complications: retinopathy, mo microalbuminuria  macrovascular complications: CAD  ASA/GILDA/statin:  ASA 325mg daily, no microalbuminuira, atorvastatin 40mg    - previous myalgias on higher dose of statin    .) HTN, controlled  current antihypertensive regimen: atenolol/chlorthalidone 25/12.5mg, amlodipine 5mg,   regimen changes:  intolerance:  future titration/work-up plan: low threshold to change/add ACEI if development of microalbuminuria    .) Health maintenance   - declines any means of CRC screening   - DEXA '18: osteopenia   - prior DEE   - q2 yr mammography   - declines adult vaccinations    RTC in 6 months

## 2022-02-16 NOTE — PROGRESS NOTES
Patient:   ALEJANDRA DOYLE            MRN: [id number]            FIN: [id number]               Age:   71 years     Sex:  Female     :  1945   Associated Diagnoses:   Lumbar radiculopathy; Diabetes Mellitus, Type 2; Hypertension   Author:   Edgardo Luong MD      Subjective   Chief complaint     3/1/2017 2:25 PM CST Pt here to discuss EGD results from 17. D/C Omeprazole and started on Protonix and Carafate. Out of Carafate tablets.   3/1/2017 1:36 PM CST f/u ER - sciatica.  Difficult sleeping last night-meds not helping, hasn't taken anything today.  Seening Chiro which is helping/  Pain no longer radiating down leg   .     Ms. Alejandra Doyle is a 71 year old woman with history of HTN, HLD, GERD, type 2 DM and prior episode of left lumbar radiculopathy who presents to follow up on recent ER visit  for acute lower back pain with radiation into the right leg.  Her pain started abruptly on  when she was rising from a chair after just finishing some vacuuming.  She describes a sharp pain in the middle of her lower back that soon radiated into her right buttock and down her right leg to just below knee level.  No associated foot/toe numbness or tingling.  She presented to the ED shortly after the pain started, where was given IM toradol with almost immediate relief and was discharged with flexeril and tramadol for continued pain control at home.  The toradol lost its effect after approximately 4-6 hours, but her pain was fairly well controlled with flexeril 5mg and tramadol 50 mg q6-8hrs.  Last night, however, neither medication seemed effective and she was unable to get to sleep at all.      Alejandra is quite uncomfortable this morning, hoping for a more effective analgesic so she can get some rest.  She recounts a prior episode of lumbar radiculopathy on her left side while resulted in hospitalization for 4 days for pain control.  She denies numbness/tingling in her legs.  No bowel/bladder incontinence.         Health Status   Allergies:    Allergic Reactions (Selected)  Severity Not Documented  Actos (Gi upset,edema)  Avalide (Angioedema)  Avandia (Breathing problem)  Avelox (Shaky)  Benadryl (Hives)  Glucophage (Gi sx)  Lisinopril (Angioedema)  Naprosyn (Hives)  Penicillin (Hives)  PredniSONE (Hives)  Sulfa drug (Hives)  Tylenol (Feet swelling)  Zithromax (Breathing trouble)   Medications:  (Selected)   Prescriptions  Prescribed  1/2 cc insulin syringes (uses 2 daily) and One touch test strips and lancets: 1/2 cc insulin syringes (uses 2 daily) and One touch test strips and lancets, See Instructions, Instructions: uses 2 daily, Supply, # 100 EA, 5 Refill(s), Type: Maintenance, Pharmacy: [facility] #[id number], uses 2 daily  HumaLOG KwikPen 100 units/mL subcutaneous solution: See Instructions, Instructions: USE 17 UNITS SUBCUTANEOUSLY 15 MIN. [id number] MAY INCREASE, # 45 mL, 3 Refill(s), Pharmacy: [facility] #[id number], USE 17 UNITS SUBCUTANEOUSLY 15 MIN. A7UDYYT MAY INCREASE  Norco 5 mg-325 mg oral tablet: 1 tab(s), po, q4-6 hrs, PRN: as needed for pain, # 30 tab(s), 0 Refill(s), Type: Maintenance, Pharmacy: [facility] #[id number], 1 tab(s) po q4-6 hrs,PRN:as needed for pain  Protonix 40 mg oral delayed release tablet: 1 tab(s) ( 40 mg ), PO, daily, # 95 tab(s), 0 Refill(s), Type: Maintenance, Pharmacy: [facility] #[id number], 1 tab(s) po daily  Toujeo SoloStar 300 units/mL subcutaneous solution: ( 35 unit(s) ), subcutaneous, daily, Instructions: Can increase by 1 unit until AM BS less than 130 if needed. This will replace Lantus, # 36 mL, 3 Refill(s), Type: Maintenance, Pharmacy: [facility] #[id number], keep on hold and pt will notify when nee...  amLODIPine 5 mg oral tablet: 1 tab(s), PO, Daily, # 90 tab(s), 3 Refill(s), Type: Maintenance, Pharmacy: [facility] #[id number], 1 tab(s) po daily  aspirin 325 mg oral tablet: 1 tab(s) ( 325 mg ), PO, Daily, # 30 tab(s), 0 Refill(s), Type: Maintenance, OTC  (Rx)  atenolol-chlorthalidone 50 mg-25 mg oral tablet: 0.5 tab, PO, Daily, # 135 tab(s), 3 Refill(s), Type: Maintenance, Pharmacy: [facility] #[id number], 0.5 tab po daily  atorvastatin 40 mg oral tablet: 1 tab(s) ( 40 mg ), po, daily, # 90 tab(s), 3 Refill(s), Type: Maintenance, Pharmacy: [facility] #[id number], 1 tab(s) po daily  fluconazole 150 mg oral tablet: 1 tab(s) ( 150 mg ), PO, q48 hrs, # 2 tab(s), 1 Refill(s), Type: Soft Stop, Pharmacy: [facility] #[id number], 1 tab(s) po q48 hrs  gabapentin 300 mg oral capsule: See Instructions, Instructions: 1-3 cap(s) po qhs, # 50 cap(s), 1 Refill(s), Type: Maintenance, Pharmacy: [facility] #[id number], 1-3 cap(s) po qhs  nitroglycerin 0.4 mg sublingual tablet: 1 tab(s), SL, q5min, PRN: for chest pain, # 25 tab(s), 1 Refill(s), Type: Maintenance, Pharmacy: [facility] #[id number], keep on hold and pt will notify when needed, 1 tab(s) sl q5 min,PRN:for chest pain  short insulin pen needles: short insulin pen needles, See Instructions, Instructions: insulin shots 4x/day, Supply, # 400 EA, 3 Refill(s), Type: Maintenance, Pharmacy: [facility] #[id number], insulin shots 4x/day  Documented Medications  Documented  HumaLOG KwikPen: subcutaneous, Instructions: Sliding Scale: <150-0 units, 151-200-3 units, 201-250-6 units, 251-300-9 units, 301-350-12 units, 0 Refill(s), Type: Maintenance  cyclobenzaprine 10 mg oral tablet: 0.5 tab, PO, q8 hrs, PRN: for spasm, # 20 tab(s), 0 Refill(s), Type: Maintenance   Problem list:    All Problems (Selected)  Acute Low Back Pain / ICD-9-.2 / Confirmed  Arteriosclerotic Heart Disease (ASHD) / ICD-9-.00 / Confirmed  Background Diabetic Retinopathy / ICD-9-.50 / Confirmed  Diabetes Mellitus, Type 2 / ICD-9-.00 / Confirmed  GERD (Gastroesophageal Reflux Disease) / ICD-9-.81 / Confirmed  Glaucoma / ICD-9-.9 / Confirmed  Hypertension / SNOMED CT 3527797628 / Confirmed  Hyperlipemia / SNOMED CT 635052269 /  Confirmed  Obesity / ICD-9-.00 / Probable      Objective   Vital Signs   3/1/2017 2:25 PM CST Temperature Tympanic 97.1 DegF  LOW    Peripheral Pulse Rate 50 bpm  LOW    Systolic Blood Pressure 140 mmHg    Diastolic Blood Pressure 61 mmHg    Mean Arterial Pressure 87 mmHg    BP Site Right arm    BP Method Electronic   3/1/2017 1:36 PM CST Temperature Tympanic 97.1 DegF  LOW    Peripheral Pulse Rate 88 bpm    Systolic Blood Pressure 156 mmHg  HI    Diastolic Blood Pressure 60 mmHg    Mean Arterial Pressure 92 mmHg    BP Site Right arm    BP Systolic Repeat 150 mmHg    BP Diastolic Repeat 60 mmHg      Measurements from flowsheet : Measurements   3/1/2017 2:25 PM CST Weight Measured - Standard 164.4 lb   3/1/2017 1:36 PM CST Weight Measured - Standard 164.4 lb      General: Alert, oriented, restless elderly woman. Mild distress.    CV: Regular rate and rhythm. No murmurs, rubs, or gallops.  LE: Warm, well perfused. No edema.    Neuro: Paraspinal musculature in lumbar region tender to palpation. Straight leg test + on right side.  Preserved sensation to light touch and 1+ reflexes in bilateral lower extremities.         Results Review   No new results today.       Impression and Plan     1. Lumbar radiculopathy: Symptoms poorly controlled on current regimen.  No alarm symptoms necessitating imaging at this time.    -Start gabapentin: 300 mg for 3-4 days, then 600 mg for 3-4 days, then 300 mg qAM / 600 mg qPM.    -If pain is not controlled with gabapentin alone, could also taking flexeril in addition if this is not too sedating.   -If neither of these or the combination is effective, Norco 5-325 mg 1 tab q4-6hr for breakthrough pain also prescribed.    -Avoid NSAIDs given severe reflux disease and gastric/esophageal irritation with ongoing GI workup.   -Encouraged to continue chiropractic sessions as she has been, as these seem to be very helpful; could also consider physical therapy in the future if symptoms  persist.     Irais Dorado, Medical Student .  Pt seen and evaluated by myself.  Agree with above stated plan   Assessment and Plan:       Diagnosis: Lumbar radiculopathy (WNI04-VF M54.16), Diabetes Mellitus, Type 2 (CDZ96-LT E11.9), Hypertension (ZOB84-VK I10).

## 2022-02-16 NOTE — TELEPHONE ENCOUNTER
---------------------  From: Irais Azevedo CMA (eRx Pool ([id number]_[facility]))   To: [facility] Message Pool ([id number]_[place] [facility]);     Sent: 10/21/2019 7:09:51 AM CDT  Subject: FW: Medication Management   Due Date/Time: 10/21/2019 11:19:00 AM CDT     Medication Refill needing approval    PCP:   [id number]    Medication:   Tramadol   Last Filled:  5/29/19    Quantity:  30  Refills:  0  CSA on file?   no     Date of last office visit and reason:   9/18/19; DM  Date of last labs pertaining to condition:  9/13/19    Return to Clinic order placed?  yes; March            ** Patient matched by Irais Azevedo CMA on 10/21/2019 7:07:23 AM CDT **      ------------------------------------------  From: [id number]  To: Edgardo Luong MD  Sent: October 20, 2019 11:19:59 AM CDT  Subject: Medication Management  Due: October 21, 2019 11:19:59 AM CDT    ** On Hold Pending Signature **  Drug: traMADol (traMADol 50 mg oral tablet)  TAKE 1 TABLET BY MOUTH EVERY 4 HOURS AS NEEDED FOR PAIN  Quantity: 30 tab(s)  Days Supply: 0  Refills: 0  Substitutions Allowed  Notes from Pharmacy:     Dispensed Drug: traMADol (traMADol 50 mg oral tablet)  TAKE 1 TABLET BY MOUTH EVERY 4 HOURS AS NEEDED FOR PAIN  Quantity: 30 tab(s)  Days Supply: 5  Refills: 0  Substitutions Allowed  Notes from Pharmacy:   ---------------------------------------------------------------  From: Paula Concepcion CMA ([facility] Message Pool ([id number]_[facility]))   To: Edgardo Luong MD;     Sent: 10/22/2019 12:48:55 PM CDT  Subject: FW: Medication Management   Due Date/Time: 10/21/2019 11:19:00 AM CDT---------------------  From: Paula Concepcion CMA ([facility] Message Pool ([id number]_[facility]))   To: Edgardo Luong MD;     Sent: 10/22/2019 12:49:34 PM CDT  Subject: FW: Medication Management   Due Date/Time: 10/21/2019 11:19:00 AM CDT

## 2022-02-16 NOTE — PROGRESS NOTES
Patient:   ALEJANDRA HERRERA            MRN: [id number]            FIN: [id number]               Age:   75 years     Sex:  Female     :  1945   Associated Diagnoses:   Obesity; Diabetes mellitus, type 2; Background Diabetic Retinopathy; Arteriosclerotic Heart Disease (ASHD); Hyperlipemia   Author:   Edgardo Luong MD      Visit Information      Date of Service: 2021 01:35 pm  Performing Location: [facility]  Encounter#: [id number]      Primary Care Provider (PCP):  Edgardo Luong MD    NPI# [id number]      Referring Provider:  Edgardo Luong MD, NPI# [id number]      Chief Complaint   2021 1:45 PM CST    right ear pain, pains right side of head and into her neck/glands - painful to touch, fatigued, SOB, sl cough x               Additional Information:No additional information recorded during visit.   Chief complaint and symptoms as noted above and confirmed with patient      History of Present Illness   2014: Alejandra presents to clinic to establish care, previously followed by JEB.  She has a history of type 2 diabetes historically with uncontrolled hyperglycemia with hemoglobin A1c consistently above 9%, until approximately 1-1/2 years ago she was transitioned to Lantus basal and Lispro bolus insulin dosing with significant improvement in her glycemic control. Currently taking Lantus 40 units nightly, Humalog 15 units 3 times a day before meals.  She has a history of retinopathy, no known peripheral neuropathy or nephropathy.  She has remote history of atherosclerotic coronary artery disease, undergoing angioplasty in the mid 90s.  Denies any anginal equivalent symptoms.  She has never undergone screening for colorectal cancer. Labs reviewed with her.     2019: Alejandra presents to clinic for diabetic follow-up.  Overall doing well.  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  She is doing fewer amounts of chores which she feels like is  helping with her back and her body aches.  Concerns related to her cumulative cost and insulin.  Otherwise tolerating medications without issues.  Feels like she has been doing well overall    2/20/20: Yoselyn presents for regular diabetic follow-up.  She was surprised to see recent increase in her A1c.  Does not feel like she is been doing differently in terms of her insulin regimen.  We had pursued freestyle emily on her this past year though apparently did run into obstacles in terms of working with the device company.  Describes having cold-like symptoms now for the past 5 days.  Describes a chest tightness with some dyspnea.  Some cough no real production.  No described fever chills.  She does say her  has had similar symptoms.    7/22/2020: Yoselyn returns for follow-up.  She has been using a freestyle emily monitor since last visit.a she feels like this is been very helpful on being more accountable with her diet.  Actually has not really increased her kilo daily insulin dose.  She finds if she uses NovoLog beyond 15 units she is more prone to have postprandial lows.  Did briefly try a 7030 premixed though had significant issues with getting her blood sugars down.  Since has reverted back to basal bolus insulin.  We reviewed her findings she is to week and 30-day averages are a significant curve from her baseline.  A1c already improving now down to 7.9%.  Does describe some left ear fullness and diminished hearing acuity.  Does wear hearing aids bilaterally.  She is appropriately socially distancing.  1/26/21: Yoselyn presents to clinic with 1 week history of general malaise, right-sided ear pain and lymphadenitis with associated cough and shortness of breath.  Previously had issues with sore throat and particularly tender anterior cervical lymph nodes.  Now able to swallow with less difficulty.  No described sick contacts.  She says that she is remained isolated.  Her last recollection of leaving the house was  over a week ago when running errands in [place]         Review of Systems   Constitutional:  Fever, Chills, Weakness, Fatigue.    Eye:  Negative except as documented in history of present illness.    Ear/Nose/Mouth/Throat:  Sore throat.         Decreased hearing: Bilaterally.    Respiratory:  Shortness of breath, Cough, No wheezing.    Cardiovascular:  No chest pain, No palpitations, No peripheral edema, No syncope.    Gastrointestinal:  No vomiting, No constipation, No heartburn, No abdominal pain.    Genitourinary:  No dysuria, No hematuria.    Hematology/Lymphatics:  Negative except as documented in history of present illness.    Endocrine:  No excessive thirst, No polyuria.    Immunologic:  Malaise, No recurrent fevers.    Musculoskeletal:  Joint pain, Decreased range of motion, No back pain, No neck pain, No muscle pain.    Neurologic:  Alert and oriented X4, Headache, No numbness, No tingling.       Health Status   Allergies:    Allergic Reactions (Selected)  Severity Not Documented  Actos (Gi upset,edema)  Avalide (Angioedema)  Avandia (Breathing problem)  Avelox (Shaky)  Benadryl (Hives)  Glucophage (Gi sx)  Lisinopril (Angioedema)  Naprosyn (Hives)  Penicillin (Hives)  PredniSONE (Hives)  Sulfa drug (Hives and nausea and vomitting)  Tylenol (Feet swelling)  Zithromax (Breathing trouble)  Nonallergic Reactions (Selected)  Unknown  Hydrochlorothiazide-irbesartan (Angioedema)  Ibuprofen (No reactions were documented)  Peanuts (Itching)   Medications:  (Selected)   Prescriptions  Prescribed  NovoLOG FlexPen 100 units/mL injectable solution: See Instructions, Instructions: 12-15 units Subcutaneous tidac, # 60 mL, 1 Refill(s), Type: Maintenance, Pharmacy: [id number], keep on hold, 12-15 units Subcutaneous tidac, 61.25, in, 07/22/20 10:58:00 CDT, Height Measured, 169.12, lb, 07/2...  Gamaliel Anne SoloStar 300 units/mL subcutaneous solution: ( 36 units ), Subcutaneous, daily, # 45 mL, 1 Refill(s), Type:  Maintenance, Pharmacy: [facility], keep on hold, 36 units Subcutaneous daily, 61.25, in, 07/22/20 10:58:00 CDT, Height Measured, 169.12, lb, 07/22/20 10:58:00 CDT, Weight Measured...  Ventolin HFA 90 mcg/inh inhalation aerosol: See Instructions, Instructions: 1-2 puff(s) Inhale q2-4hr, PRN: as needed for wheezing, # 1 EA, 1 Refill(s), Type: Maintenance, Pharmacy: [place] [facility] [id number], 1-2 puff(s) Inhale q2-4hr,PRN:as needed for wheezing  amLODIPine 5 mg oral tablet: = 1 tab(s), Oral, daily, # 90 tab(s), 1 Refill(s), Type: Maintenance, Pharmacy: [place] [facility] [id number], keep on file-pt will notify when needed, 1 tab(s) Oral daily, 61.25, in, 07/22/20 10:58:00 CDT, Height Measured, 169.12, lb, 07/22/20 10:58:00 CDT,...  aspirin 325 mg oral tablet: 1 tab(s) ( 325 mg ), PO, Daily, # 30 tab(s), 0 Refill(s), Type: Maintenance, OTC (Rx)  atenolol-chlorthalidone 50 mg-25 mg oral tablet: 0.5 tab(s), Oral, daily, # 45 tab(s), 1 Refill(s), Type: Maintenance, Pharmacy: [place] [facility] [id number], keep on file-pt will notify when needed, 0.5 tab(s) Oral daily, 61.25, in, 07/22/20 10:58:00 CDT, Height Measured, 169.12, lb, 07/22/20 10:58:00 CDT...  atorvastatin 40 mg oral tablet: = 1 tab(s), Oral, daily, # 90 tab(s), 1 Refill(s), Type: Maintenance, Pharmacy: [facility] [id number], keep on file-pt will notify when needed, 1 tab(s) Oral daily, 61.25, in, 07/22/20 10:58:00 CDT, Height Measured, 169.12, lb, 07/22/20 10:58:00 CDT,...  nitroglycerin 0.4 mg sublingual tablet: = 1 tab(s), SL, q5min, PRN: for chest pain, # 25 tab(s), 1 Refill(s), Type: Maintenance, Pharmacy: [id number], keep on hold and pt will notify when needed, 1 tab(s) SL q5 min,PRN:for chest pain  pantoprazole 40 mg oral delayed release tablet: = 1 tab(s) ( 40 mg ), po, daily, # 90 tab(s), 1 Refill(s), Type: Maintenance, Pharmacy: [id number], keep on file and pt will notify when needed, 1 tab(s) Oral daily, 61.25, in, 07/22/20 10:58:00 CDT,  Height Measured, 169.12, lb, 07/22/20 10...  traMADol 50 mg oral tablet: See Instructions, Instructions: TAKE 1 TABLET BY MOUTH EVERY 4 HOURS AS NEEDED FOR PAIN, # 30 tab(s), Type: Soft Stop, Pharmacy: [id number], TAKE 1 TABLET BY MOUTH EVERY 4 HOURS AS NEEDED FOR PAIN,    Medications          *denotes recorded medication          Ventolin HFA 90 mcg/inh inhalation aerosol: See Instructions, 1-2 puff(s) Inhale q2-4hr, PRN: as needed for wheezing, 1 EA, 1 Refill(s).          amLODIPine 5 mg oral tablet: 1 tab(s), Oral, daily, 90 tab(s), 1 Refill(s).          aspirin 325 mg oral tablet: 325 mg, 1 tab(s), PO, Daily, 30 tab(s).          atenolol-chlorthalidone 50 mg-25 mg oral tablet: 0.5 tab(s), Oral, daily, 45 tab(s), 1 Refill(s).          atorvastatin 40 mg oral tablet: 1 tab(s), Oral, daily, 90 tab(s), 1 Refill(s).          NovoLOG FlexPen 100 units/mL injectable solution: See Instructions, 12-15 units Subcutaneous tidac, 60 mL, 1 Refill(s).          Toujeo Max SoloStar 300 units/mL subcutaneous solution: 36 units, Subcutaneous, daily, 45 mL, 1 Refill(s).          nitroglycerin 0.4 mg sublingual tablet: 1 tab(s), SL, q5min, PRN: for chest pain, 25 tab(s), 1 Refill(s).          pantoprazole 40 mg oral delayed release tablet: 40 mg, 1 tab(s), po, daily, 90 tab(s), 1 Refill(s).          traMADol 50 mg oral tablet: See Instructions, TAKE 1 TABLET BY MOUTH EVERY 4 HOURS AS NEEDED FOR PAIN, 30 tab(s).       Problem list:    All Problems  Acute low back pain / SNOMED CT 748099669 / Confirmed  Arteriosclerotic heart disease (ASHD) / SNOMED CT 33070490 / Confirmed  GERD (gastroesophageal reflux disease) / SNOMED CT 698479467 / Confirmed  Glaucoma / SNOMED CT 29676764 / Confirmed  Hypertension / SNOMED CT 5349327557 / Confirmed  Insulin long-term use / SNOMED CT 0341073828 / Confirmed  Hyperlipemia / SNOMED CT 770847576 / Confirmed  Background diabetic retinopathy / SNOMED CT 0173210875 / Confirmed  Obesity /  SNOMED CT 8701483249 / Probable  Diabetes mellitus, type 2 / SNOMED CT 036359416 / Confirmed  Inactive: Tobacco user / ICD-9-.1  Resolved: *Hospitalized@[facility] - Acute low back pain, L4 nerve root impingement  Canceled: Long term current use of oral hypoglycemic drug / SNOMED CT 717454813  Canceled: Obese / ICD-9-.00      Histories   Past Medical History:    Active  Diabetes mellitus, type 2 (002414769): Onset in  at 51 years.  Comments:  2010 CST 3:00 PM CST Adwoa Concepcion CMAP[facility]  started Insulin 2002  Background diabetic retinopathy (7379706570)  Arteriosclerotic heart disease (ASHD) (15638215)  Comments:  2010 CST 3:02 PM SAADIA Concepcion [facility]  Coronary Angioplasty and Stents x 2    2010 CDT 2:14 PM CDT - Lew Huggins MD  &   GERD (gastroesophageal reflux disease) (051872809)  Hypertension (6632526023)  Hyperlipemia (075838366)  Acute low back pain (954011675)  Comments:  2012 CDT 10:46 AM Lew Christensen MD  L3-L4 disc with L4 nerve root impingement  Obesity (6134559425)  Glaucoma (70344728)  Comments:  2012 CDT 1:46 PM CDT - Brooke Barraza  Right eye  Resolved  *Hospitalized@[facility] - Acute low back pain, L4 nerve root impingement: Onset on 2012 at 66 years.  Resolved.   Family History:    Suicide  Father ()  Kidney stone  Son (Elliot)  Diabetes mellitus type II  Mother ()  Asthma  Sister (Shira)  Heart attack  Mother ()  Son (Terrence)  CABG - Coronary artery bypass graft  Son (Terrence)  Coronary heart disease  Mother ()     Procedure history:    Esophagogastroduodenoscopy (217764320) on 2017 at 71 Years.  Comments:  2/10/2017 12:02 PM Vinayak Park MD  Gastric ulcer  Coronary angiogram (16796179) in the month of 2010 at 64 Years.  Comments:  2010 10:18 AM Lew Christensen MD  no intervention with stents  OD cataract with IOL in the month of 2006 at 61 Years.  glaucoma surgery OD in the month of  3/2006 at 60 Years.  coronary angioplasty with stents in 2000 at 55 Years.  Coronary angioplasty (00853508) in 1997 at 52 Years.  DEE - Total abdominal hysterectomy and bilateral salpingo-oophorectomy (3729050495) in 1975 at 30 Years.  Comments:  4/21/2010 1:59 PM CDT - Devika  Hoda  benign fibroids  Cholecystectomy (01255758) in 1968 at 23 Years.  Appendectomy (483632461) in 1960 at 15 Years.  Tonsillectomy (324297440) in 1956 at 11 Years.  Dilatation and curettage (9424959248).  Comments:  4/21/2010 1:59 PM CDT - Devika  Hoda  benign   Social History:        Electronic Cigarette/Vaping Assessment            Electronic Cigarette Use: Never.      Alcohol Assessment            Never      Tobacco Assessment            Former smoker, quit more than 30 days ago            Past                     Comments:                      09/25/2012 - Brooke Barraza                     Quit in 2000      Employment and Education Assessment            Retired, Work/School description: [employer].        Physical Examination   vital signs stable, as noted above   Vital Signs   1/26/2021 1:45 PM CST Temperature Tympanic 96.3 DegF  LOW    Peripheral Pulse Rate 59 bpm  LOW    Systolic Blood Pressure 134 mmHg  HI    Diastolic Blood Pressure 60 mmHg    Mean Arterial Pressure 85 mmHg    Oxygen Saturation 94 %      Measurements from flowsheet : Measurements   1/26/2021 1:45 PM CST    Weight Measured - Standard                172.4 lb     General:  Alert and oriented, No acute distress.    Eye:  Pupils are equal, round and reactive to light, Extraocular movements are intact, Normal conjunctiva.    HENT:  Normocephalic, Oral mucosa is moist, No pharyngeal erythema, ceruminosis bilaterally.    Neck:  Supple, Non-tender, No carotid bruit, No jugular venous distention, palpable right sided posterior and anterior cervical lymphadenopathy - tender to palpation.    Respiratory:  Lungs are clear to auscultation, Respirations are non-labored.     Cardiovascular:  Normal rate, Regular rhythm, No murmur, No edema.    Musculoskeletal:  Normal strength.    Neurologic:  Alert, Oriented, Normal motor function, No focal deficits, Cranial Nerves II-XII are grossly intact.    Cognition and Speech:  Oriented, Speech clear and coherent, Functional cognition intact.    Psychiatric:  Appropriate mood & affect.       Review / Management   Results review      Impression and Plan   Diagnosis     Obesity (TRZ67-IC E66.9).     Diabetes mellitus, type 2 (RKV27-FM E11.9).     Background Diabetic Retinopathy (UVC52-UP E11.311).     Arteriosclerotic Heart Disease (ASHD) (SJM32-DB I25.10).     Hyperlipemia (LKR68-JV E78.2).         .) acute URI/viral illness (associated cough, corrhyza, sinus congestion, cervical lymphadenitis)  - SpO2 94% on ambient air; no respiratory distress  - CXR: appears normal based on my read (overpenetrated); questionable lateral interstitial changes   - await radiology interpretation  - CBC: normal WBC, normal diff  - BMP: baseline SCr 1.5; lytes remarkable  - treating like COVID PUI - NP PCR obtained today   - advised continued isolation at home (including from  as feasible)   - monitor at home for any worsening dyspnea.  Encouraged obtaining home SpO2         10 minutes spent reviewing medications/labs/imaging   10 minutes spent completing documentation for visit on day of visit     plan as previously outlined and not discussed at today's visit     .) GERD/h/o esophagitis   - EGD in 12/2016 showing gastric ulcer - previous symptoms now resolved   - pantoprazole 40mg daily   - denies any NSAIDs    .) type II DM, controlled   hypoglycemic medications: intolerant to metformin due to GI symptoms  insulin therapy: Toujeo 35 units, Humalog 12-15 units TID + SSI   - ICR 1:3, sensitivity 10 (~20 units q meal)   - could consider future GLP1 agonist   - using CGM/Freestyle Adry and seeing significant benefits from use  last HbA1c: 7.9%   microvascular  complications: proliferative retinopathy - sees ophtho regularly, mo microalbuminuria  macrovascular complications: CAD  ASA/GILDA/statin:  ASA 325mg daily, no microalbuminuira, atorvastatin 40mg    - previous myalgias on higher dose of statin    Patient has met criteria for CGM coverage;   - patient has type 2 diabetes mellitus; and  - patient has been using a blood glucose monitor and performing four or more blood glucose test per day; and  - patient is insulin treated with multiple daily injections of three or more times per day; and  - within six months prior to ordering the CGM, the patient has had an in-person visit with the practitioner; and determined that criteria (1-4) above are met; and   - every six months following the initial prescription of the CGM, the treating practitioner has an in-person visit with the patient to assess adherence to their CGM regimen and diabetes treatment plan     .) HTN, controlled  current antihypertensive regimen: atenolol/chlorthalidone 25/12.5mg, amlodipine 5mg,   regimen changes:  intolerance:  future titration/work-up plan: low threshold to change/add ACEI if development of microalbuminuria    .) CAD   - Lexiscan (12/2016) - no reversible defects   - previously intolerant to higher dose ISMN, she does have known atherostenotic disease involving LCx   - ASA, atenolol 25mg daily, atorvastatin 40mg QHS,    - lower threshold for PCI given historic intolerance to long-acting nitrates    .) Health maintenance   - declines any means of CRC screening   - DEXA '18: osteopenia   - prior DEE   - q2 yr mammography   - declines adult vaccinations except for pneumococcal series           Professional Services   Counseling Summary:  This was a 25 minute visit with greater than 50% of that time spent counseling the patient.

## 2022-02-16 NOTE — PROGRESS NOTES
Patient:   ALEJANDRA HERRERA            MRN: [id number]            FIN: [id number]               Age:   76 years     Sex:  Female     :  1945   Associated Diagnoses:   Obesity; Diabetes mellitus, type 2; Background Diabetic Retinopathy; Arteriosclerotic Heart Disease (ASHD); Hyperlipemia   Author:   Edgardo Luong MD      Visit Information      Date of Service: 2021 12:43 pm  Performing Location: [facility]  Encounter#: [id number]      Primary Care Provider (PCP):  Edgardo Luong MD    NPI# [id number]      Referring Provider:  Edgardo Luong MD, NPI# [id number]      Chief Complaint   2021 12:53 PM CDT   Patient here today for CDM and lab f/u. Patient alos c/o plugged R ear.              Additional Information:No additional information recorded during visit.   Chief complaint and symptoms as noted above and confirmed with patient      History of Present Illness   2014: Alejandra presents to clinic to establish care, previously followed by [facility].  She has a history of type 2 diabetes historically with uncontrolled hyperglycemia with hemoglobin A1c consistently above 9%, until approximately 1-1/2 years ago she was transitioned to Lantus basal and Lispro bolus insulin dosing with significant improvement in her glycemic control. Currently taking Lantus 40 units nightly, Humalog 15 units 3 times a day before meals.  She has a history of retinopathy, no known peripheral neuropathy or nephropathy.  She has remote history of atherosclerotic coronary artery disease, undergoing angioplasty in the mid 90s.  Denies any anginal equivalent symptoms.  She has never undergone screening for colorectal cancer. Labs reviewed with her.     2021: Alejandra presents to clinic with 1 week history of general malaise, right-sided ear pain and lymphadenitis with associated cough and shortness of breath.  Previously had issues with sore throat and particularly tender anterior cervical lymph nodes.  Now able to swallow with  less difficulty.  No described sick contacts.  She says that she is remained isolated.  Her last recollection of leaving the house was over a week ago when running errands in [facility] returns to clinic for follow-up.  Overall doing okay.  Enjoys her freestyle emily.  Reviewed her meter summary showing a recent 7-day average of blood sugars 180.  Denies any hypoglycemic episodes.  Currently using Toujeo 32 units at night.  Has been giving more serious consideration to receiving Covid vaccination.  Her previous concerns related to her historical allergies to multiple medications though denies having any allergies to previous injections.  Historically has avoided influenza shots due to same concerns.          Review of Systems   Constitutional:  No fever, No chills, No weakness, No fatigue.    Eye:  Negative except as documented in history of present illness.    Ear/Nose/Mouth/Throat:  No sore throat     Decreased hearing: Bilaterally.    Respiratory:  No shortness of breath, No cough, No wheezing.    Cardiovascular:  No chest pain, No palpitations, No peripheral edema, No syncope.    Gastrointestinal:  No vomiting, No constipation, No heartburn, No abdominal pain.    Genitourinary:  No dysuria, No hematuria.    Hematology/Lymphatics:  Negative except as documented in history of present illness.    Endocrine:  No excessive thirst, No polyuria.    Immunologic:  No recurrent fevers, No malaise.    Musculoskeletal:  Back pain, Joint pain, No neck pain, No muscle pain, No decreased range of motion.    Integumentary:  varicose veins.    Neurologic:  Alert and oriented X4, No numbness, No tingling, No headache.       Health Status   Allergies:    Allergic Reactions (Selected)  Severity Not Documented  Actos (Gi upset,edema)  Avalide (Angioedema)  Avandia (Breathing problem)  Avelox (Shaky)  Benadryl (Hives)  Glucophage (Gi sx)  Lisinopril (Angioedema)  Naprosyn (Hives)  Penicillin (Hives)  PredniSONE (Hives)  Sulfa drug  (Hives and nausea and vomitting)  Tylenol (Feet swelling)  Zithromax (Breathing trouble)  Nonallergic Reactions (Selected)  Unknown  Hydrochlorothiazide-irbesartan (Angioedema)  Ibuprofen (No reactions were documented)  Peanuts (Itching)   Medications:  (Selected)   Prescriptions  Prescribed  NovoLOG FlexPen 100 units/mL injectable solution: See Instructions, Instructions: 12-15 units Subcutaneous tidac, # 60 mL, 1 Refill(s), Type: Maintenance, Pharmacy: [facility], keep on hold, 12-15 units Subcutaneous tidac, 61.25, in, 07/22/20 10:58:00 CDT, Height Measured, 170.4, lb, 02/19...  Toujeo Max SoloStar 300 units/mL subcutaneous solution: ( 36 units ), Subcutaneous, daily, # 45 mL, 1 Refill(s), Type: Maintenance, Pharmacy: [place] [id number], keep on hold, 61.25, in, 07/22/20 10:58:00 CDT, Height Measured, 170.4, lb, 02/19/21 13:49:00 CST, Weight Measured  Ventolin HFA 90 mcg/inh inhalation aerosol: See Instructions, Instructions: 1-2 puff(s) Inhale q2-4hr, PRN: as needed for wheezing, # 1 EA, 1 Refill(s), Type: Maintenance, Pharmacy: [place] [id number], 1-2 puff(s) Inhale q2-4hr,PRN:as needed for wheezing  amLODIPine 5 mg oral tablet: = 1 tab(s), Oral, daily, # 90 tab(s), 1 Refill(s), Type: Maintenance, Pharmacy: [place] [id number], keep on file-pt will notify when needed, 1 tab(s) Oral daily, 61.25, in, 07/22/20 10:58:00 CDT, Height Measured, 170.4, lb, 02/19/21 13:49:00 CST, W...  aspirin 325 mg oral tablet: 1 tab(s) ( 325 mg ), PO, Daily, # 30 tab(s), 0 Refill(s), Type: Maintenance, OTC (Rx)  atenolol-chlorthalidone 50 mg-25 mg oral tablet: 0.5 tab(s), Oral, daily, # 45 tab(s), 1 Refill(s), Type: Maintenance, Pharmacy: [id number], keep on file-pt will notify when needed, 0.5 tab(s) Oral daily, 61.25, in, 07/22/20 10:58:00 CDT, Height Measured, 170.4, lb, 02/19/21 13:49:00 CST,...  atorvastatin 40 mg oral tablet: = 1 tab(s), Oral, daily, # 90 tab(s), 1 Refill(s), Type: Maintenance,  Pharmacy: [facility], keep on file-pt will notify when needed, 1 tab(s) Oral daily, 61.25, in, 07/22/20 10:58:00 CDT, Height Measured, 170.4, lb, 02/19/21 13:49:00 CST, W...  nitroglycerin 0.4 mg sublingual tablet: = 1 tab(s), SL, q5min, PRN: for chest pain, # 25 tab(s), 1 Refill(s), Type: Maintenance, Pharmacy: [facility], keep on hold and pt will notify when needed, 1 tab(s) SL q5 min,PRN:for chest pain  pantoprazole 40 mg oral delayed release tablet: = 1 tab(s) ( 40 mg ), po, daily, # 90 tab(s), 1 Refill(s), Type: Maintenance, Pharmacy: [facility], keep on file and pt will notify when needed, 1 tab(s) Oral daily, 61.25, in, 07/22/20 10:58:00 CDT, Height Measured, 170.4, lb, 02/19/21 13:...  traMADol 50 mg oral tablet: See Instructions, Instructions: TAKE 1 TABLET BY MOUTH EVERY 4 HOURS AS NEEDED FOR PAIN, # 30 tab(s), 0 Refill(s), Type: Soft Stop, Pharmacy: [place] [id number], TAKE 1 TABLET BY MOUTH EVERY 4 HOURS AS NEEDED FOR PAIN, 61.25, in, 07/22/20 10:58:00...,    Medications          *denotes recorded medication          Ventolin HFA 90 mcg/inh inhalation aerosol: See Instructions, 1-2 puff(s) Inhale q2-4hr, PRN: as needed for wheezing, 1 EA, 1 Refill(s).          amLODIPine 5 mg oral tablet: 1 tab(s), Oral, daily, 90 tab(s), 1 Refill(s).          aspirin 325 mg oral tablet: 325 mg, 1 tab(s), PO, Daily, 30 tab(s).          atenolol-chlorthalidone 50 mg-25 mg oral tablet: 0.5 tab(s), Oral, daily, 45 tab(s), 1 Refill(s).          atorvastatin 40 mg oral tablet: 1 tab(s), Oral, daily, 90 tab(s), 1 Refill(s).          NovoLOG FlexPen 100 units/mL injectable solution: See Instructions, 12-15 units Subcutaneous tidac, 60 mL, 1 Refill(s).          Toujeo Max SoloStar 300 units/mL subcutaneous solution: 36 units, Subcutaneous, daily, 45 mL, 1 Refill(s).          nitroglycerin 0.4 mg sublingual tablet: 1 tab(s), SL, q5min, PRN: for chest pain, 25 tab(s), 1 Refill(s).           pantoprazole 40 mg oral delayed release tablet: 40 mg, 1 tab(s), po, daily, 90 tab(s), 1 Refill(s).          traMADol 50 mg oral tablet: See Instructions, TAKE 1 TABLET BY MOUTH EVERY 4 HOURS AS NEEDED FOR PAIN, 30 tab(s), 0 Refill(s).       Problem list:    All Problems  Acute low back pain / SNOMED CT 079717548 / Confirmed  Arteriosclerotic heart disease (ASHD) / SNOMED CT 17449049 / Confirmed  GERD (gastroesophageal reflux disease) / SNOMED CT 278208256 / Confirmed  Glaucoma / SNOMED CT 57382499 / Confirmed  Hypertension / SNOMED CT 0749617420 / Confirmed  Insulin long-term use / SNOMED CT 7880375690 / Confirmed  Hyperlipemia / SNOMED CT 008075196 / Confirmed  Background diabetic retinopathy / SNOMED CT 0712748541 / Confirmed  Obesity / SNOMED CT 3128225561 / Probable  Diabetes mellitus, type 2 / SNOMED CT 695345628 / Confirmed  Inactive: Tobacco user / ICD-9-.1  Resolved: *[facility]ospitalized@[facility] - Acute low back pain, L4 nerve root impingement  Canceled: Long term current use of oral hypoglycemic drug / SNOMED CT 490986159  Canceled: Obese / ICD-9-.00      Histories   Past Medical History:    Active  Diabetes mellitus, type 2 (535978941): Onset in 1996 at 51 years.  Comments:  1/29/2010 CST 3:00 PM CST - Jamey CARDENASPaula  started Insulin 2/2002  Background diabetic retinopathy (4925048471)  Arteriosclerotic heart disease (ASHD) (39565510)  Comments:  1/29/2010 CST 3:02 PM SAADIA Concepcion CMAPaula  Coronary Angioplasty and Stents x 2    11/1/2010 CDT 2:14 PM CDT - Lew Huggins MD  1997& 2000  GERD (gastroesophageal reflux disease) (204658070)  Hypertension (1606348084)  Hyperlipemia (689102508)  Acute low back pain (574682392)  Comments:  6/27/2012 CDT 10:46 AM CDT - Lew Huggins MD  L3-L4 disc with L4 nerve root impingement  Obesity (9134068236)  Glaucoma (77146839)  Comments:  9/25/2012 CDT 1:46 PM CDT - Brooke Barraza  Right eye  Resolved  *Hospitalized@[facility] - Acute low back pain, L4 nerve root  impingement: Onset on 2012 at 66 years.  Resolved.   Family History:    Suicide  Father ()  Kidney stone  Son (Elliot)  Diabetes mellitus type II  Mother ()  Asthma  Sister (Shira)  Heart attack  Mother ()  Son (Terrence)  CABG - Coronary artery bypass graft  Son (Terrence)  Coronary heart disease  Mother ()     Procedure history:    Diabetic retinal eye exam (0031678813) on 3/24/2021 at 75 Years.  Comments:  3/26/2021 2:49 PM CDT - Srikanth Larson CMA  Diabetic retinopathy OU with Diabetic macular edema in each eye.  20/100 Right eye and 20/30 left eye  Esophagogastroduodenoscopy (599548259) on 2017 at 71 Years.  Comments:  2/10/2017 12:02 PM CST - Vinayak Lei MD  Gastric ulcer  Coronary angiogram (01892251) in the month of 2010 at 64 Years.  Comments:  2010 10:18 AM CDT - Lew Huggins MD  no intervention with stents  OD cataract with IOL in the month of 2006 at 61 Years.  glaucoma surgery OD in the month of 3/2006 at 60 Years.  coronary angioplasty with stents in  at 55 Years.  Coronary angioplasty (88133551) in  at 52 Years.  DEE - Total abdominal hysterectomy and bilateral salpingo-oophorectomy (6176584568) in  at 30 Years.  Comments:  2010 1:59 PM CDT - Hoda Fortune  benign fibroids  Cholecystectomy (83140530) in  at 23 Years.  Appendectomy (150534258) in  at 15 Years.  Tonsillectomy (951312935) in 6 at 11 Years.  Dilatation and curettage (5536716775).  Comments:  2010 1:59 PM CDT - Hoda Fortune  benign   Social History:        Electronic Cigarette/Vaping Assessment            Electronic Cigarette Use: Never.      Alcohol Assessment            Never      Tobacco Assessment            Former smoker, quit more than 30 days ago            Past                     Comments:                      2012 - Brooke Barraza                     Quit in       Employment and Education Assessment            Retired, Work/School  description: [employer].        Physical Examination   vital signs stable, as noted above   Vital Signs   8/11/2021 12:53 PM CDT Temperature Tympanic 97.5 DegF  LOW    Peripheral Pulse Rate 64 bpm    HR Method Electronic    Systolic Blood Pressure 174 mmHg  HI    Diastolic Blood Pressure 76 mmHg    Mean Arterial Pressure 109 mmHg    BP Site Right arm    BP Method Electronic      Measurements from flowsheet : Measurements   8/11/2021 12:53 PM CDT   Weight Measured - Standard                167.3 lb     General:  Alert and oriented, No acute distress.    Eye:  Pupils are equal, round and reactive to light, Extraocular movements are intact, Normal conjunctiva.    HENT:  Normocephalic, ceruminosis bilaterally.    Neck:  Supple, Non-tender.    Respiratory:  Lungs are clear to auscultation, Respirations are non-labored.    Cardiovascular:  Normal rate, Regular rhythm, No murmur, No edema.    Musculoskeletal:  Normal strength.    Neurologic:  Alert, Oriented, Normal motor function, No focal deficits, Cranial Nerves II-XII are grossly intact.    Cognition and Speech:  Oriented, Speech clear and coherent, Functional cognition intact.    Psychiatric:  Appropriate mood & affect.       Review / Management   Results review:  Lab results   8/7/2021 9:10 AM CDT Hgb A1c 8.2  HI    Cholesterol 172 mg/dL    Non-    HDL 60 mg/dL    Chol/HDL Ratio 2.9    LDL 91    Triglyceride 118 mg/dL    U Creatinine 121 mg/dL    U Microalbumin 3.4 mg/dL    Ur Microalb/Creat Ratio 28   .       Impression and Plan   Diagnosis     Obesity (WHO21-JR E66.9).     Diabetes mellitus, type 2 (EZK69-KH E11.9).     Background Diabetic Retinopathy (VBW92-RS E11.311).     Arteriosclerotic Heart Disease (ASHD) (YQA08-OS I25.10).     Hyperlipemia (ORA80-OT E78.2).         .) bilateral impacted cerumen  - successful lavage by nursing staff    .) GERD/h/o esophagitis   - EGD in 12/2016 showing gastric ulcer - previous symptoms now resolved   - pantoprazole 40mg  daily   - denies any NSAIDs    .) type II DM, uncontrolled   hypoglycemic medications: intolerant to metformin due to GI symptoms  insulin therapy: Toujeo 36 units, Humalog 12-15 units TID + SSI   - ICR 1:3, sensitivity 10 (~20 units q meal)   - could consider future GLP1 agonist   - using CGM/Freestyle Adry and seeing significant benefits from use  last HbA1c: 8.2%   microvascular complications: proliferative retinopathy - sees ophtho regularly, no microalbuminuria  macrovascular complications: CAD  ASA/GILDA/statin:  ASA 325mg daily, no microalbuminuria, atorvastatin 40mg    - previous myalgias on higher dose of statin    Patient has met criteria for CGM coverage;   - patient has type 2 diabetes mellitus; and  - patient has been using a blood glucose monitor and performing four or more blood glucose test per day; and  - patient is insulin treated with multiple daily injections of three or more times per day; and  - within six months prior to ordering the CGM, the patient has had an in-person visit with the practitioner; and determined that criteria (1-4) above are met; and   - every six months following the initial prescription of the CGM, the treating practitioner has an in-person visit with the patient to assess adherence to their CGM regimen and diabetes treatment plan     .) HTN, uncontrolled  current antihypertensive regimen: atenolol/chlorthalidone 25/12.5mg, amlodipine 5mg,   regimen changes:  intolerance:  future titration/work-up plan: low threshold to change/add ACEI if development of microalbuminuria    .) CAD   - Lexiscan (12/2016) - no reversible defects   - previously intolerant to higher dose ISMN, she does have known atherostenotic disease involving LCx   - ASA, atenolol 25mg daily, atorvastatin 40mg QHS,    - lower threshold for PCI given historic intolerance to long-acting nitrates    .) Health maintenance   - declines any means of CRC screening   - DEXA '18: osteopenia   - prior DEE   - q2 yr  mammography   - discussed COVID vaccination - she's more open to receiving and says she'll schedule in near future     RTC in 6 months

## 2022-02-16 NOTE — PROGRESS NOTES
Patient:   ALEJANDRA HERRERA            MRN: [id number]            FIN: [id number]               Age:   71 years     Sex:  Female     :  1945   Associated Diagnoses:   Chronic GERD   Author:   Vinayak Lei MD      Visit Information      Date of Service: 2017 02:21 pm  Performing Location: [facility]  Encounter#: [id number]      Primary Care Provider (PCP):  Edgardo Luong MD    NPI# [id number]      Referring Provider:  No referring provider recorded for selected visit.      Chief Complaint       3/1/2017 2:25 PM CST Pt here to discuss EGD results from 17. D/C Omeprazole and started on Protonix and Carafate. Out of Carafate tablets.   3/1/2017 1:36 PM CST f/u ER - sciatica.  Difficult sleeping last night-meds not helping, hasn't taken anything today.  Seening Chiro which is helping/  Pain no longer radiating down leg         History of Present Illness   Had a normal nuclear stress test 2017.  Would get dull pain after eating every meal and certain foods. Denied exertional pain. Symptoms present over a year. Had dysphagia once a month that goes down with water quickly. Denied weight loss. Had early satiety. Had taken omeprazole for 5 years with benefit. Increasing to twice a day did not give benefit.  EGD 2017 showed gastric ulcers and esophagram with significant GERD. Started protonix and carafate 3 weeks ago. Able to breathe better. Dull pain after eating has resolved. Feel much better than when on omeprazole. Has not had any dysphagia since the EGD. Following the GERD diet and thinks helping.    Quit coffee and pop in past 3 weeks. Does not drink alchol. Nonsmoker         Review of Systems   Constitutional:  No fever.    Respiratory:  No shortness of breath.    Cardiovascular:  No chest pain.    Gastrointestinal:  No nausea, No vomiting.    Constipation has resolved.      Health Status   Allergies:    Allergic Reactions (Selected)  Severity Not Documented  Actos (Gi  upset,edema)  Avalide (Angioedema)  Avandia (Breathing problem)  Avelox (Shaky)  Benadryl (Hives)  Glucophage (Gi sx)  Lisinopril (Angioedema)  Naprosyn (Hives)  Penicillin (Hives)  PredniSONE (Hives)  Sulfa drug (Hives)  Tylenol (Feet swelling)  Zithromax (Breathing trouble)   Medications:  (Selected)   Prescriptions  Prescribed  1/2 cc insulin syringes (uses 2 daily) and One touch test strips and lancets: 1/2 cc insulin syringes (uses 2 daily) and One touch test strips and lancets, See Instructions, Instructions: uses 2 daily, Supply, # 100 EA, 5 Refill(s), Type: Maintenance, Pharmacy: [facility] #[id number], uses 2 daily  HumaLOG KwikPen 100 units/mL subcutaneous solution: See Instructions, Instructions: USE 17 UNITS SUBCUTANEOUSLY 15 MIN. [id number] MAY INCREASE, # 45 mL, 3 Refill(s), Pharmacy: [facility] #[id number], USE 17 UNITS SUBCUTANEOUSLY 15 MIN. [id number] MAY INCREASE  Norco 5 mg-325 mg oral tablet: 1 tab(s), po, q4-6 hrs, PRN: as needed for pain, # 30 tab(s), 0 Refill(s), Type: Maintenance, Pharmacy: [facility] #[id number], 1 tab(s) po q4-6 hrs,PRN:as needed for pain  Protonix 40 mg oral delayed release tablet: 1 tab(s) ( 40 mg ), PO, daily, # 95 tab(s), 0 Refill(s), Type: Maintenance, Pharmacy: [facility] #[id number], 1 tab(s) po daily  Toujeo SoloStar 300 units/mL subcutaneous solution: ( 35 unit(s) ), subcutaneous, daily, Instructions: Can increase by 1 unit until AM BS less than 130 if needed. This will replace Lantus, # 36 mL, 3 Refill(s), Type: Maintenance, Pharmacy: [facility] #[id number], keep on hold and pt will notify when nee...  amLODIPine 5 mg oral tablet: 1 tab(s), PO, Daily, # 90 tab(s), 3 Refill(s), Type: Maintenance, Pharmacy: [facility] #[id number], 1 tab(s) po daily  aspirin 325 mg oral tablet: 1 tab(s) ( 325 mg ), PO, Daily, # 30 tab(s), 0 Refill(s), Type: Maintenance, OTC (Rx)  atenolol-chlorthalidone 50 mg-25 mg oral tablet: 0.5 tab, PO, Daily, # 135 tab(s), 3 Refill(s), Type: Maintenance,  Pharmacy: [facility] #[id number], 0.5 tab po daily  atorvastatin 40 mg oral tablet: 1 tab(s) ( 40 mg ), po, daily, # 90 tab(s), 3 Refill(s), Type: Maintenance, Pharmacy: [facility] #[id number], 1 tab(s) po daily  fluconazole 150 mg oral tablet: 1 tab(s) ( 150 mg ), PO, q48 hrs, # 2 tab(s), 1 Refill(s), Type: Soft Stop, Pharmacy: [facility] #[id number], 1 tab(s) po q48 hrs  gabapentin 300 mg oral capsule: See Instructions, Instructions: 1-3 cap(s) po qhs, # 50 cap(s), 1 Refill(s), Type: Maintenance, Pharmacy: [facility] #[id number], 1-3 cap(s) po qhs  nitroglycerin 0.4 mg sublingual tablet: 1 tab(s), SL, q5min, PRN: for chest pain, # 25 tab(s), 1 Refill(s), Type: Maintenance, Pharmacy: [facility] #[id number], keep on hold and pt will notify when needed, 1 tab(s) sl q5 min,PRN:for chest pain  short insulin pen needles: short insulin pen needles, See Instructions, Instructions: insulin shots 4x/day, Supply, # 400 EA, 3 Refill(s), Type: Maintenance, Pharmacy: [facility] #[id number], insulin shots 4x/day  Documented Medications  Documented  HumaLOG KwikPen: subcutaneous, Instructions: Sliding Scale: <150-0 units, 151-200-3 units, 201-250-6 units, 251-300-9 units, 301-350-12 units, 0 Refill(s), Type: Maintenance  cyclobenzaprine 10 mg oral tablet: 0.5 tab, PO, q8 hrs, PRN: for spasm, # 20 tab(s), 0 Refill(s), Type: Maintenance   Problem list:    All Problems  Acute Low Back Pain / ICD-9-.2 / Confirmed  Arteriosclerotic Heart Disease (ASHD) / ICD-9-.00 / Confirmed  Background Diabetic Retinopathy / ICD-9-.50 / Confirmed  Diabetes Mellitus, Type 2 / ICD-9-.00 / Confirmed  GERD (Gastroesophageal Reflux Disease) / ICD-9-.81 / Confirmed  Glaucoma / ICD-9-.9 / Confirmed  Hypertension / SNOMED CT 4083067370 / Confirmed  Hyperlipemia / SNOMED CT 388562860 / Confirmed  Obesity / ICD-9-.00 / Probable  Inactive: Tobacco user / ICD-9-.1  Resolved: *[facility]ized@[facility] - Acute low back pain, L4  nerve root impingement  Canceled: Obese / ICD-9-.00      Histories   Family History: No family history esophageal or stomach cancer   Procedure history:    Esophagogastroduodenoscopy (SNOMED CT 760485117) on 2/8/2017 at 71 Years.  Comments:  2/10/2017 12:02 PM - Vinayak Lei MD  Gastric ulcer  Coronary angiogram (SNOMED CT 22963900) in the month of 4/2010 at 64 Years.  Comments:  7/2/2010 10:18 AM - Lew Huggins MD  no intervention with stents  OD cataract with IOL in the month of 12/2006 at 61 Years.  glaucoma surgery OD in the month of 3/2006 at 60 Years.  coronary angioplasty with stents in 2000 at 55 Years.  Coronary angioplasty (SNOMED CT 50278396) in 1997 at 52 Years.  DEE - Total abdominal hysterectomy and bilateral salpingo-oophorectomy (SNOMED CT 1137542424) in 1975 at 30 Years.  Comments:  4/21/2010 1:59 PM - Hoda Fortune  benign fibroids  Cholecystectomy (SNOMED CT 91003648) in 1968 at 23 Years.  Appendectomy (SNOMED CT 444399062) in 1960 at 15 Years.  Tonsillectomy (SNOMED CT 553239310) in 1956 at 11 Years.  Dilatation and curettage (SNOMED CT 4533307453).  Comments:  4/21/2010 1:59 PM - Hoda Fortune  benign   Social History:  and nonsmoker      Physical Examination   Vital Signs   3/1/2017 2:25 PM CST Temperature Tympanic 97.1 DegF  LOW    Peripheral Pulse Rate 50 bpm  LOW    Systolic Blood Pressure 140 mmHg    Diastolic Blood Pressure 61 mmHg      General:  Alert and oriented, No acute distress.    Respiratory:  Lungs are clear to auscultation.    Cardiovascular:  Normal rate, Regular rhythm.    Gastrointestinal:  Soft, Non-tender, Non-distended.    Musculoskeletal:  Normal gait.    Neurologic:  No focal deficits.    Psychiatric:  Appropriate mood & affect.       Review / Management   Cardiologist felt nuclear stress test with old lesions and no new active blockage    Esophagram: February 2017  CONCLUSION:   1.  Severe spontaneous gastroesophageal reflux to the upper  esophagus.  2.  Mild esophageal dysmotility with stasis of contrast and intraesophageal reflux in the recumbent position.      Impression and Plan   Diagnosis     Chronic GERD (QYB49-FZ K21.9).       Continue treatment with protonix once daily. Likely had tachyphylaxis from omeprazole. Avoid NSAIDs. Follow up in 3 months. May continue an aspirin daily.

## 2022-02-16 NOTE — PROGRESS NOTES
Patient:   ALEJANDRA HERRERA            MRN: [id number]            FIN: [id number]               Age:   73 years     Sex:  Female     :  1945   Associated Diagnoses:   Obesity; Diabetes Mellitus, Type 2; Background Diabetic Retinopathy; Arteriosclerotic Heart Disease (ASHD); Hyperlipemia   Author:   Edgardo Luong MD      Visit Information      Date of Service: 2018 01:44 pm  Performing Location: [facility]  Encounter#: [id number]      Primary Care Provider (PCP):  Edgardo Luong MD    NPI# [id number]      Referring Provider:  Edgardo Luong MD, NPI# [id number]      Chief Complaint   2018 1:50 PM CST   Fell last Monday and c/o L knee pain, black and blue, swollen. Has tried icing and heat.              Additional Information:No additional information recorded during visit.   Chief complaint and symptoms as noted above and confirmed with patient      History of Present Illness   2014: Alejandra presents to clinic to establish care, previously followed by [facility].  She has a history of type 2 diabetes historically with uncontrolled hyperglycemia with hemoglobin A1c consistently above 9%, until approximately 1-1/2 years ago she was transitioned to Lantus basal and Lispro bolus insulin dosing with significant improvement in her glycemic control. Currently taking Lantus 40 units nightly, Humalog 15 units 3 times a day before meals.  She has a history of retinopathy, no known peripheral neuropathy or nephropathy.  She has remote history of atherosclerotic coronary artery disease, undergoing angioplasty in the mid 90s.  Denies any anginal equivalent symptoms.  She has never undergone screening for colorectal cancer. Labs reviewed with her.     10/18/2018: Alejandra returns for follow-up.  Overall doing well.  No specific complaints or concerns.  She does express interest in pursuing new meter specifically asking about th Freestyle Adry device.  Denies any hypoglycemia.  No recent insulin changes.  We reviewed  her labs.  Overall glycemic control is remaining relatively stable.  No further issues with chest pain.    11/23/2018: Zenia presents to clinic after traumatic fall while chasing goats at home occurring on Monday, November 19.  She says she fell forward straight onto both kneecaps onto a cement floor.  She has been icing the area since.  Particularly painful along infrapatellar margin on left side especially medially.  Pains with straightening of the leg.  Is able to ambulate and weight-bear without issues.         Review of Systems   Constitutional:  No fever, No chills.    Eye:  Negative except as documented in history of present illness.    Ear/Nose/Mouth/Throat:  Negative except as documented in history of present illness.    Respiratory:  No shortness of breath.    Cardiovascular:  Chest pain, No palpitations, No peripheral edema, No syncope.    Gastrointestinal:  No vomiting, No constipation, No heartburn, No abdominal pain.    Genitourinary:  No dysuria, No hematuria.    Hematology/Lymphatics:  Negative except as documented in history of present illness.    Endocrine:  No excessive thirst, No polyuria.    Immunologic:  No recurrent fevers.    Musculoskeletal:  Joint pain, Decreased range of motion, Trauma, No back pain, No neck pain, No muscle pain.    Neurologic:  Alert and oriented X4, No numbness, No tingling.       Health Status   Allergies:    Allergic Reactions (Selected)  Severity Not Documented  Actos (Gi upset,edema)  Avalide (Angioedema)  Avandia (Breathing problem)  Avelox (Shaky)  Benadryl (Hives)  Glucophage (Gi sx)  Lisinopril (Angioedema)  Naprosyn (Hives)  Penicillin (Hives)  PredniSONE (Hives)  Sulfa drug (Hives)  Tylenol (Feet swelling)  Zithromax (Breathing trouble)   Medications:  (Selected)   Prescriptions  Prescribed  1/2 cc insulin syringes (uses 2 daily) and One touch test strips and lancets: 1/2 cc insulin syringes (uses 2 daily) and One touch test strips and lancets, See Instructions,  Instructions: uses 2 daily, Supply, # 100 EA, 5 Refill(s), Type: Maintenance, Pharmacy: [facility] #[id number], uses 2 daily  NovoLOG FlexPen 100 units/mL injectable solution: See Instructions, Instructions: INJECT 15 UNITS SUBCUTANEOUSLY THREE TIMES DAILY BEFORE MEALS, # 45 mL, 3 Refill(s), Type: Soft Stop, Pharmacy: [facility] #[id number], keep on file, INJECT 15 UNITS SUBCUTANEOUSLY THREE TIMES DAILY BEFORE MEALS  ONE TOUCH ULTRA TEST STRIPS: ONE TOUCH ULTRA TEST STRIPS, See Instructions, Instructions: TESTING qid - E11.9, Supply, # 400 EA, 3 Refill(s), Type: Maintenance, Pharmacy: [facility] #[id number], keep on file and pt will notify when needed  Toujeo SoloStar 300 units/mL subcutaneous solution: See Instructions, Instructions: INJECT 35 UNITS SUBCUTANEOUSLY ONCE DAILY. MAY INCREASE 1 UNIT UNTIL SUGAR IS BELOW 130, # 13.5 mL, 3 Refill(s), Type: Soft Stop, Pharmacy: [facility] #[id number], keep on file-, INJECT 35 UNITS SUBCUTANEOUSLY ONCE DAILY...  amLODIPine 5 mg oral tablet: = 1 tab(s), PO, Daily, # 90 tab(s), 3 Refill(s), Type: Maintenance, Pharmacy: [facility] #[id number], keep on file and pt will notify when needed, 1 tab(s) Oral daily  aspirin 325 mg oral tablet: 1 tab(s) ( 325 mg ), PO, Daily, # 30 tab(s), 0 Refill(s), Type: Maintenance, OTC (Rx)  atenolol-chlorthalidone 50 mg-25 mg oral tablet: 0.5 tab, PO, Daily, # 45 tab(s), 3 Refill(s), Type: Maintenance, Pharmacy: [facility] #[id number], keep on file and pt will notify when needed, 0.5 tab Oral daily  atorvastatin 40 mg oral tablet: = 1 tab(s) ( 40 mg ), po, daily, # 90 tab(s), 3 Refill(s), Type: Maintenance, Pharmacy: [facility] #[id number], keep on file and pt will notify when needed, 1 tab(s) Oral daily  nitroglycerin 0.4 mg sublingual tablet: = 1 tab(s), SL, q5min, PRN: for chest pain, # 25 tab(s), 1 Refill(s), Type: Maintenance, Pharmacy: [facility] #[id number], keep on hold and pt will notify when needed, 1 tab(s) SL q5 min,PRN:for chest  pain  pantoprazole 40 mg oral delayed release tablet: = 1 tab(s) ( 40 mg ), po, daily, # 90 tab(s), 3 Refill(s), Type: Maintenance, Pharmacy: [facility] #[id number], keep on file and pt will notify when needed, 1 tab(s) Oral daily  short insulin pen needles: short insulin pen needles, See Instructions, Instructions: insulin shots 4x/day, Supply, # 400 EA, 3 Refill(s), Type: Maintenance, Pharmacy: [facility] #[id number], keep on hold and pt will notify when needed, insulin shots 4x/day   Problem list:    All Problems  Acute low back pain / SNOMED CT 061484360 / Confirmed  Arteriosclerotic heart disease (ASHD) / SNOMED CT 77089071 / Confirmed  GERD (gastroesophageal reflux disease) / SNOMED CT 111380635 / Confirmed  Glaucoma / SNOMED CT 73588756 / Confirmed  Hypertension / SNOMED CT 9996623323 / Confirmed  Insulin long-term use / SNOMED CT 3100814857 / Confirmed  Hyperlipemia / SNOMED CT 213692020 / Confirmed  Background diabetic retinopathy / SNOMED CT 5186883443 / Confirmed  Obesity / SNOMED CT 3601012651 / Probable  Diabetes mellitus, type 2 / SNOMED CT 230334060 / Confirmed  Inactive: Tobacco user / ICD-9-.1  Resolved: *Hospitalized@[facility] - Acute low back pain, L4 nerve root impingement  Canceled: Long term current use of oral hypoglycemic drug / SNOMED CT 351059468  Canceled: Obese / ICD-9-.00      Histories   Past Medical History:    Active  Diabetes mellitus, type 2 (727441620): Onset in 1996 at 51 years.  Comments:  1/29/2010 CST 3:00 PM CST - Paula Concepcion CMA  started Insulin 2/2002  Background diabetic retinopathy (3415131625)  Arteriosclerotic heart disease (ASHD) (44043082)  Comments:  1/29/2010 CST 3:02 PM CST - Paula Concepcion CMA  Coronary Angioplasty and Stents x 2    11/1/2010 CDT 2:14 PM CDT - Lew Huggins MD  1997& 2000  GERD (gastroesophageal reflux disease) (964714767)  Hypertension (4077681961)  Hyperlipemia (648670044)  Acute low back pain (980346682)  Comments:  6/27/2012 CDT 10:46  AM CDT - Lew Huggins MD  L3-L4 disc with L4 nerve root impingement  Obesity (8685891933)  Glaucoma (09000961)  Comments:  2012 CDT 1:46 PM CDT - Brooke Barraza  Right eye  Resolved  *Hospitalized@[facility] - Acute low back pain, L4 nerve root impingement: Onset on 2012 at 66 years.  Resolved.   Family History:    Suicide  Father ()  Kidney stone  Son (Elliot)  Diabetes mellitus type II  Mother ()  Asthma  Sister (Shira)  Heart attack  Mother ()  Son (Terrence)  CABG - Coronary artery bypass graft  Son (Terrence)  Coronary heart disease  Mother ()     Procedure history:    Esophagogastroduodenoscopy (910874385) on 2017 at 71 Years.  Comments:  2/10/2017 12:02 PM - Elliot Lei MDory  Gastric ulcer  Coronary angiogram (95686267) in the month of 2010 at 64 Years.  Comments:  2010 10:18 AM - Lew Huggins MD  no intervention with stents  OD cataract with IOL in the month of 2006 at 61 Years.  glaucoma surgery OD in the month of 3/2006 at 60 Years.  coronary angioplasty with stents in  at 55 Years.  Coronary angioplasty (10966760) in  at 52 Years.  DEE - Total abdominal hysterectomy and bilateral salpingo-oophorectomy (3997992157) in  at 30 Years.  Comments:  2010 1:59 PM - Hoda Fortune  benign fibroids  Cholecystectomy (79435179) in 1968 at 23 Years.  Appendectomy (635949088) in  at 15 Years.  Tonsillectomy (233784529) in 6 at 11 Years.  Dilatation and curettage (8500540895).  Comments:  2010 1:59 PM - Hoda Fortune  benign   Social History:        Alcohol Assessment            Never      Tobacco Assessment            Past                     Comments:                      2012 - Brooke Barraza                     Quit in       Employment and Education Assessment            Retired, Work/School description: [employer].        Physical Examination   vital signs stable, as noted above   Vital Signs   2018 1:50 PM CST  Temperature Tympanic 97.6 DegF  LOW    Peripheral Pulse Rate 51 bpm  LOW    Systolic Blood Pressure 138 mmHg  HI    Diastolic Blood Pressure 52 mmHg  LOW    Mean Arterial Pressure 81 mmHg    BP Site Right arm    BP Method Manual    Oxygen Saturation 97 %      Measurements from flowsheet : Measurements   11/23/2018 1:50 PM CST Height Measured - Standard 62.25 in    Weight Measured - Standard 169 lb    BSA 1.83 m2    Body Mass Index 30.66 kg/m2  HI      General:  Alert and oriented, No acute distress.    Eye:  Pupils are equal, round and reactive to light.    Respiratory:  Lungs are clear to auscultation, Respirations are non-labored.    Cardiovascular:  Normal rate, Regular rhythm, No murmur, No edema.    Musculoskeletal:  Normal strength, left with finger trigger finger, Visible swelling of left knee.  Focal pains along medial, inferior aspect on palpation.  No pains with engagement of patella.  Able to extend knee with PROM and AROM.    Neurologic:  Alert, Oriented.    Cognition and Speech:  Oriented, Speech clear and coherent, Functional cognition intact.    Psychiatric:  Appropriate mood & affect.       Review / Management   Results review:  Lab results: 10/8/2018 3:44 PM CDT    Hgb A1c                   7.9  HI  .       Impression and Plan   Diagnosis     Obesity (VZG81-CY E66.9).     Diabetes Mellitus, Type 2 (ADX32-PA E11.9).     Background Diabetic Retinopathy (HKR93-NG E11.311).     Arteriosclerotic Heart Disease (ASHD) (XHP45-SY I25.10).     Hyperlipemia (UYH76-EU E78.2).           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  - general reassurance; RICE  - recommending PT, especially if stiffness, soreness persists through this weekend    plan as previously outlined:     .) CAD   - Lexiscan (12/2016) - no reversible defects   - previously intolerant to higher dose ISMN, she does have known atherostenotic disease involving  LCx   - ASA, atenolol 25mg daily, atorvastatin 40mg QHS,    - lower threshold for PCI given historic intolerance to long-acting nitrates    .) GERD/esophagitis   - EGD in 12/2016 showing gastric ulcer - previous symptoms now resolved   - pantoprazole 40mg daily   - denies any NSAIDs    .) type II DM, controlled  hypoglycemic medications: intolerant to metformin due to GI symptoms  insulin therapy: Toujeo 40 units, Humalog 12-15 units TID + SSI   - flowsheet for ICR 1:4, sensitivity 15 (~20 units q meal)   - could consider future GLP1 agonist   - wants to trial Freestyle Adry  last HbA1c: 7.9%   microvascular complications: retinopathy, mo microalbuminuria  macrovascular complications: CAD  ASA/GILDA/statin:  ASA 325mg daily, no microalbuminuira, atorvastatin 40mg    - previous myalgias on higher dose of statin    .) HTN, controlled  current antihypertensive regimen: atenolol/chlorthalidone 25/12.5mg, amlodipine 5mg,   regimen changes:  intolerance:  future titration/work-up plan: low threshold to change/add ACEI if development of microalbuminuria    .) Health maintenance   - declines any means of CRC screening   - DEXA '18: osteopenia   - prior DEE   - q2 yr mammography   - declines adult vaccinations    RTC as previously scheduled

## 2022-02-16 NOTE — TELEPHONE ENCOUNTER
---------------------  From: Johana Knox CMA (Phone Messages Pool ([id number]))   To: Appointment Pool ([id number]);     Sent: 1/25/2021 5:31:05 PM CST  Subject: appt for sinus/ear infection     Pt LM in  at 1602 stating wants appt with [facility] for ear/sinus infection. Please call her at [phone] to schedule. Thanks---------------------  From: Kathryn Worrell (Appointment Pool ([id number]))   To: Phone Messages Pool ([id number] - [place]);     Sent: 1/26/2021 8:23:03 AM CST  Subject: RE: appt for sinus/ear infection     in clinic or virtual---------------------  From: Jose Miguel SANCHEZ, Magaly GASPAR (Phone Messages Pool ([id number] - [place]))   To: Appointment Pool ([id number]);     Sent: 1/26/2021 8:28:02 AM CST  Subject: RE: appt for sinus/ear infection     In person is bestappt is already scheduled

## 2022-02-16 NOTE — PROGRESS NOTES
Patient:   ALEJANDRA HERRERA            MRN: [id number]            FIN: [id number]               Age:   72 years     Sex:  Female     :  1945   Associated Diagnoses:   Preoperative clearance   Author:   Vinayak Fregoso MD      Chief Complaint   3/12/2018 9:11 AM CDT    preop--surgery 3/14/18 at [facility] w/ Dr. Mathur for left trigger finger release.      Preoperative Information   Indication for surgery:  symptomatic trigger finger, left ring finger.    Accompanied by:  No one.    Source of history:  Self, Medical record.           Review of Systems   Constitutional:  No fever, No chills, No sweats, No weakness, No fatigue.    Eye:  No recent visual problem.    Ear/Nose/Mouth/Throat:  No decreased hearing, No nasal congestion, No sore throat.    Respiratory:  No shortness of breath, No cough.    Cardiovascular:  Negative, No chest pain, No palpitations, No peripheral edema.    Gastrointestinal:  No nausea, No vomiting, No diarrhea, No constipation, No heartburn.    Genitourinary:  No dysuria, No change in urine stream.    Hematology/Lymphatics:  No bruising tendency, No bleeding tendency.    Endocrine:  No cold intolerance, No heat intolerance.    Immunologic:  Negative.    Musculoskeletal:  No back pain, No neck pain, No joint pain, No muscle pain.    Integumentary:  No rash, No dryness, No skin lesion.    Neurologic:  Alert and oriented X4, No headache.    Psychiatric:  No anxiety, No depression.       Health Status   Allergies:    Allergic Reactions (Selected)  Severity Not Documented  Actos (Gi upset,edema)  Avalide (Angioedema)  Avandia (Breathing problem)  Avelox (Shaky)  Benadryl (Hives)  Glucophage (Gi sx)  Lisinopril (Angioedema)  Naprosyn (Hives)  Penicillin (Hives)  PredniSONE (Hives)  Sulfa drug (Hives)  Tylenol (Feet swelling)  Zithromax (Breathing trouble)   Medications:  (Selected)   Prescriptions  Prescribed  1/2 cc insulin syringes (uses 2 daily) and One touch test strips and lancets: 1/2 cc  insulin syringes (uses 2 daily) and One touch test strips and lancets, See Instructions, Instructions: uses 2 daily, Supply, # 100 EA, 5 Refill(s), Type: Maintenance, Pharmacy: [facility] #[id number], uses 2 daily  NovoLOG FlexPen 100 units/mL subcutaneous solution: ( 15 unit(s) ), subcutaneous, tidac, # 45 mL, 3 Refill(s), Type: Maintenance, Pharmacy: [facility] #[id number], keep on file and pt will notify when needed, 15 unit(s) subcutaneous tidac  ONETOUCH ULTRA BL   KITTY LIFE: See Instructions, Instructions: USE TO TEST BLOOD SUGAR 3 TIMES DAILY, # 100 unknown unit, 4 Refill(s), Type: Soft Stop, Pharmacy: [facility] #[id number], USE TO TEST BLOOD SUGAR 3 TIMES DAILY  Toujeo SoloStar 300 units/mL subcutaneous solution: See Instructions, Instructions: INJECT 35 UNITS SUBCUTANEOUSLY ONCE DAILY. MAY INCREASE 1 UNIT UNTIL SUGAR IS BELOW 130, # 6 mL, 5 Refill(s), Type: Maintenance, Pharmacy: [facility] #[id number], keep on file and pt will notify when needed, INJECT 35 UN...  amLODIPine 5 mg oral tablet: 1 tab(s), PO, Daily, # 90 tab(s), 3 Refill(s), Type: Maintenance, Pharmacy: [facility] #[id number], keep on file and pt will notify when needed, 1 tab(s) po daily  aspirin 325 mg oral tablet: 1 tab(s) ( 325 mg ), PO, Daily, # 30 tab(s), 0 Refill(s), Type: Maintenance, OTC (Rx)  atenolol-chlorthalidone 50 mg-25 mg oral tablet: 0.5 tab, PO, Daily, # 135 tab(s), 3 Refill(s), Type: Maintenance, Pharmacy: [facility] #[id number], keep on file and pt will notify when needed, 0.5 tab po daily  atorvastatin 40 mg oral tablet: 1 tab(s) ( 40 mg ), po, daily, # 90 tab(s), 3 Refill(s), Type: Maintenance, Pharmacy: [facility] #[id number], keep on file and pt will notify when needed, 1 tab(s) po daily  nitroglycerin 0.4 mg sublingual tablet: 1 tab(s), SL, q5min, PRN: for chest pain, # 25 tab(s), 1 Refill(s), Type: Maintenance, Pharmacy: [facility] #[id number], keep on hold and pt will notify when needed, 1 tab(s) sl q5 min,PRN:for chest  pain  pantoprazole 40 mg oral delayed release tablet: 1 tab(s) ( 40 mg ), po, daily, # 90 tab(s), 3 Refill(s), Type: Maintenance, Pharmacy: [facility] #[id number], keep on file and pt will notify when needed, 1 tab(s) po daily  short insulin pen needles: short insulin pen needles, See Instructions, Instructions: insulin shots 4x/day, Supply, # 400 EA, 3 Refill(s), Type: Maintenance, Pharmacy: [facility] #[id number], insulin shots 4x/day   Problem list:    All Problems  Acute low back pain / SNOMED CT 535433108 / Confirmed  Arteriosclerotic heart disease (ASHD) / SNOMED CT 55564874 / Confirmed  Background diabetic retinopathy / SNOMED CT 6842759631 / Confirmed  Diabetes mellitus, type 2 / SNOMED CT 976295125 / Confirmed  GERD (gastroesophageal reflux disease) / SNOMED CT 116129060 / Confirmed  Glaucoma / SNOMED CT 40601618 / Confirmed  Hypertension / SNOMED CT 5344480782 / Confirmed  Insulin long-term use / SNOMED CT 9928397310 / Confirmed  Hyperlipemia / SNOMED CT 945137569 / Confirmed  Obesity / SNOMED CT 2034322512 / Probable  Inactive: Tobacco user / ICD-9-.1  Resolved: *[facility]ospital[facility] - Acute low back pain, L4 nerve root impingement  Canceled: Long term current use of oral hypoglycemic drug / SNOMED CT 758137544  Canceled: Obese / ICD-9-.00      Histories   Past Medical History:    Active  Diabetes mellitus, type 2 (332359770): Onset in 1996 at 51 years.  Comments:  1/29/2010 CST 3:00 PM CST - Jamey CARDENAS Paula  started Insulin 2/2002  Background diabetic retinopathy (2952740775)  Arteriosclerotic heart disease (ASHD) (70631007)  Comments:  1/29/2010 CST 3:02 PM CST - Paula Concepcion CMA  Coronary Angioplasty and Stents x 2    11/1/2010 CDT 2:14 PM CDT - Lew Huggins MD  1997& 2000  GERD (gastroesophageal reflux disease) (886752659)  Hypertension (7769430945)  Hyperlipemia (001442725)  Acute low back pain (216293561)  Comments:  6/27/2012 CDT 10:46 AM CDT - Joseluis STINSON, Lew  L3-L4 disc with L4  nerve root impingement  Obesity (1252537609)  Glaucoma (77901914)  Comments:  2012 CDT 1:46 PM CDT - Christi  Brooke  Right eye  Resolved  *Hospitalized@[facility] - Acute low back pain, L4 nerve root impingement: Onset on 2012 at 66 years.  Resolved.   Family History:    Suicide  Father ()  Kidney stone  Son (Elliot)  Diabetes mellitus type II  Mother ()  Asthma  Sister (Shira)  Heart attack  Mother ()  Son (Terrence)  CABG - Coronary artery bypass graft  Son (Terrence)  Coronary heart disease  Mother ()     Procedure history:    Esophagogastroduodenoscopy (SNOMED CT 936462494) on 2017 at 71 Years.  Comments:  2/10/2017 12:02 PM - Vinayak Lei MD  Gastric ulcer  Coronary angiogram (SNOMED CT 43311550) in the month of 2010 at 64 Years.  Comments:  2010 10:18 AM - Lew Huggins MD  no intervention with stents  OD cataract with IOL in the month of 2006 at 61 Years.  glaucoma surgery OD in the month of 3/2006 at 60 Years.  coronary angioplasty with stents in  at 55 Years.  Coronary angioplasty (SNOMED CT 10632305) in  at 52 Years.  DEE - Total abdominal hysterectomy and bilateral salpingo-oophorectomy (SNOMED CT 2026713961) in  at 30 Years.  Comments:  2010 1:59 PM - Hoda Fortune  benign fibroids  Cholecystectomy (SNOMED CT 67941178) in  at 23 Years.  Appendectomy (SNOMED CT 836989482) in  at 15 Years.  Tonsillectomy (SNOMED CT 855121804) in  at 11 Years.  Dilatation and curettage (SNOMED CT 1535762344).  Comments:  2010 1:59 PM - Hoda Fortune  benign   Social History:        Alcohol Assessment            Never      Tobacco Assessment            Past                     Comments:                      2012 - Brooke Barraza                     Quit in       Employment and Education Assessment            Retired, Work/School description: [employer].       Has no history of anemia.  Has no history of DVT or pulmonary  embolism.  Has no personal history of bleeding problems.   Has no personal or family history of anesthesia reactions.  Patient does not have active tuberculosis.    S/he has not taken aspirin or aspirin containing products in the last week.     S/he has not taken Plavix (Clopidogrel) in the last 2 weeks.    S/he has not taken warfarin in the past week.    S/he has not been on corticosteroids for more than 2 weeks recently.      S/he is not DNR before, during or after surgery.    Chest pain / SOB walking up 2 flights of steps:  no  Pain in neck or jaw:  no  CAD MI:  yes  Afib:  no  Heart Failure:  no  Asthma  or Bronchitis:  no  Diabetes:  yes       Insulin   Seizure Disorder:  no  CKD:  yes  Thyroid Disease:  no  Liver Disease:  no  CVA:  no         Physical Examination   Vital Signs   3/12/2018 9:11 AM CDT Temperature Tympanic 97.7 DegF  LOW    Peripheral Pulse Rate 60 bpm    Pulse Site Radial artery    HR Method Manual    Systolic Blood Pressure 146 mmHg  HI    Diastolic Blood Pressure 64 mmHg    Mean Arterial Pressure 91 mmHg    BP Site Left arm    BP Method Manual      Measurements from flowsheet : Measurements   3/12/2018 9:11 AM CDT Height Measured - Standard 62.26 in    Weight Measured - Standard 167.6 lb    BSA 1.83 m2    Body Mass Index 30.4 kg/m2  HI      General:  Alert and oriented, No acute distress.    Eye:  Pupils are equal, round and reactive to light, Extraocular movements are intact, Normal conjunctiva.    HENT:  Normocephalic, Tympanic membranes are clear, Oral mucosa is moist, No pharyngeal erythema, No sinus tenderness.    Neck:  Supple, Non-tender, No carotid bruit, No lymphadenopathy, No thyromegaly.    Respiratory:  Lungs are clear to auscultation, Respirations are non-labored, Breath sounds are equal, No chest wall tenderness.    Cardiovascular:  Normal rate, Regular rhythm, No murmur, No gallop, Normal peripheral perfusion, No edema.    Gastrointestinal:  Soft, Non-tender, No organomegaly.     Musculoskeletal:  Normal range of motion, Normal strength, No swelling, No deformity, tender left palmar nodule .    Integumentary:  Warm, Dry, No rash.    Neurologic:  Alert, Oriented, No focal deficits.    Psychiatric:  Cooperative, Appropriate mood & affect.       Review / Management         Results review:  Lab results   3/12/2018 10:10 AM CDT Sodium Level 138 mmol/L    Potassium Level 4.6 mmol/L    Chloride Level 104 mmol/L    CO2 Level 25 mmol/L    Glucose Level 183 mg/dL  HI    BUN 28 mg/dL  HI    Creatinine Level 1.57 mg/dL  HI    BUN/Creat Ratio 18    eGFR 32 mL/min/1.73m2  LOW    eGFR African American 38 mL/min/1.73m2  LOW    Calcium Level 9.8 mg/dL   .    ECG interpretation:  Date:  3/12/2018.     Indication: pre-operative exam.     EKG findings   Rhythm: heart rate  49  beats/min, sinus bradycardia.     Axis: normal axis, normal configuration.     Intervals: normal.     P waves: normal.     QRS complex: normal.     ST-T-U complex: depressed ST segment.     Interpretation: borderline.     .       Impression and Plan   Diagnosis     Preoperative clearance ([id number] Z01.818).     Orders     Orders   Lab (Gen Lab  Reference Lab):  Basic Metabolic Panel* ([facility]) (Order): Specimen Type: Serum, Collection Date: 3/12/2018 9:45 AM CDT  Charges:  68981 ecg routine ecg w/least 12 lds w/i+r ([facility]) (Order): Quantity: 1, Preoperative clearance  Trigger finger.

## 2022-02-16 NOTE — NURSING NOTE
Comprehensive Intake Entered On:  2019 2:29 PM CST    Performed On:  2019 2:26 PM CST by Jamey CARDENAS Paula               Summary   Chief Complaint :   DM check    Weight Measured :   170 lb(Converted to: 170 lb 0 oz, 77.11 kg)    Systolic Blood Pressure :   126 mmHg   Diastolic Blood Pressure :   60 mmHg   Mean Arterial Pressure :   82 mmHg   Peripheral Pulse Rate :   60 bpm   BP Site :   Right arm   Pulse Site :   Radial artery   Temperature Tympanic :   97.3 DegF(Converted to: 36.3 DegC)  (LOW)    Race :      Languages :   English   Jeovannyfrances Paula CARDENAS - 2019 2:26 PM CST   Health Status   Allergies Verified? :   Yes   Medication History Verified? :   Yes   Medical History Verified? :   Yes   Pre-Visit Planning Status :   Completed   Tobacco Use? :   Former smoker   Juan Josefrances CARDENAS Paula - 2019 2:26 PM CST   Demographics   Last Name :   Vivek   Address :    [address] [place]   First Name :   Yoselyn Holden Initial :   R   Responsible Party Date of Birth () :   1945 CDT   City :   [place]   State :   [place]   Zip Code :   [address]   Jeovannyfrances CARDENASPaula - 2019 2:26 PM CST   Providers Grid   Provider Name :    Dr Camelia Albetro        Provider Specialty :    ophth   pharmacy   Cardiology          Jeovannyfrances CARDENAS Paula - 2019 2:26 PM CST  Jamey CARDENAS Paula - 2019 2:26 PM CST  Jeovannyfrances CARDENAS Paula - 2019 2:26 PM CST       Social History   Social History   (As Of: 2019 2:29:32 PM CST)   Alcohol:        Never   (Last Updated: 2018 12:46:09 PM CST by Lorelei Oreilly)          Tobacco:        Past   (Last Updated: 2010 2:07:14 PM CDT by Hoda Fortune CMA)    Comments:  2012 1:41 PM - Brooke Barraza: Quit in    (Last Updated: 2012 1:41:36 PM CDT by Brooke Barraza)          Employment and Education:        Retired, Work/School description: [employer].   (Last Updated: 2010 10:15:57 AM CDT by Lew Huggins MD)

## 2022-02-16 NOTE — TELEPHONE ENCOUNTER
---------------------  From: Milvia/Donna GARCIA (Phone Messages Pool ([id number]_[facility]))   To: [facility] Message Pool ([id number]);     Cc: Carmen Patricia;      Sent: 4/20/2020 9:51:47 AM CDT  Subject: General Message     Phone Message    PCP: SOFIE    Time of Call: 0924    Phone Number: [phone]    Returned call at: 0948    Note: Patient called stating that she is having issues with her Diabetes meter.    Called patient back at 0948. Patient stated that she put the FreeStyle Adry on her arm and ever since she put it on she started throwing up and having body aches. Patient stated she wore it Mon-Fri and when she took it off on Friday she stated that her sickness went away.    Patient aware that [facility] is back tomorrow and is ok with waiting until then. Please Advise.    Last office visit and reason: DM type 2- 3/17/20    Transferred to: [facility] Message PoolCalled and talked with Yoselyn  Pt used skin tac adhesive for Adry and she thinks that may have been the issue.  Pt really liked the adry and wants to try it again.  Recommended just using an alcohol wipe to clean the skin prior to inserting the sensor.    Pt is feeling good at this time.   No further questions.  Pt will call back if she has any issues.      Carmen

## 2022-02-16 NOTE — LETTER
(Inserted Image. Unable to display)   [facility] [place] [address] [place] [address]  August 09, 2021      ALEJANDRA HERRERA  [address] [place] [id number]        Dear ALEJANDRA,     Thank you for selecting [facility] for your healthcare needs. Below you will find the results of your recent test(s) done at our clinic.       Labs for your review.  We can discuss in more detail at future clinic visit.       Result Name Current Result Previous Result Reference Range   Hgb A1c ((H)) 8.2 8/7/2021 ((H)) 7.3 1/26/2021  - <5.7   Cholesterol (mg/dL)  172 8/7/2021  161 7/16/2020  - <200   Non-HDL Cholesterol  112 8/7/2021  97 7/16/2020  - <130   HDL (mg/dL)  60 8/7/2021  64 7/16/2020 > OR = 50 -    Cholesterol/HDL Ratio  2.9 8/7/2021  2.5 7/16/2020  - <5.0   LDL  91 8/7/2021  80 7/16/2020    Triglyceride (mg/dL)  118 8/7/2021  83 7/16/2020  - <150   U Creatinine (mg/dL)  121 8/7/2021  79 7/16/2020 20 - 275   U Microalbumin (mg/dL)  3.4 8/7/2021  2.4 7/16/2020 See Note: -    Ur Microalbumin/Creatinine Ratio  28 8/7/2021 ((H)) 30 7/16/2020  - <30       Please contact me or my assistant at [phone] if you have any questions or concerns.     Sincerely,        Edgardo Luong MD    What do your labs mean?  Below is a glossary of commonly ordered labs:  LDL - Bad Cholesterol  HDL - Good Cholesterol  AST/ALT - Liver Function  Cr/Creatinine - Kidney Function  Microalbumin - Kidney Function  BUN - Kidney Function  PSA - Prostate   TSH - Thyroid Hormone  HgbA1c - Diabetes Test  Hgb (Hemoglobin) - Red Blood Cells

## 2022-02-16 NOTE — PROGRESS NOTES
Patient:   ALEJANDRA HERRERA            MRN: [id number]            FIN: [id number]               Age:   75 years     Sex:  Female     :  1945   Associated Diagnoses:   Obesity; Diabetes Mellitus, Type 2; Background Diabetic Retinopathy; Arteriosclerotic Heart Disease (ASHD); Hyperlipemia   Author:   Edgardo Luong MD      Visit Information      Date of Service: 2020 10:46 am  Performing Location: [facility]  Encounter#: [id number]      Primary Care Provider (PCP):  Edgardo Luong MD# [id number]      Referring Provider:  Edgardo Luong MD# [id number]      Chief Complaint   2020 10:58 AM CDT   f/u chronic              Additional Information:No additional information recorded during visit.   Chief complaint and symptoms as noted above and confirmed with patient      History of Present Illness   2014: Alejandra presents to clinic to establish care, previously followed by JEB.  She has a history of type 2 diabetes historically with uncontrolled hyperglycemia with hemoglobin A1c consistently above 9%, until approximately 1-1/2 years ago she was transitioned to Lantus basal and Lispro bolus insulin dosing with significant improvement in her glycemic control. Currently taking Lantus 40 units nightly, Humalog 15 units 3 times a day before meals.  She has a history of retinopathy, no known peripheral neuropathy or nephropathy.  She has remote history of atherosclerotic coronary artery disease, undergoing angioplasty in the mid 90s.  Denies any anginal equivalent symptoms.  She has never undergone screening for colorectal cancer. Labs reviewed with her.     2019: Alejandra presents to clinic for diabetic follow-up.  Overall doing well.  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  She is doing fewer amounts of chores which she feels like is helping with her back and her body aches.  Concerns related to her cumulative cost and insulin.   Otherwise tolerating medications without issues.  Feels like she has been doing well overall    2/20/20: Yoselyn presents for regular diabetic follow-up.  She was surprised to see recent increase in her A1c.  Does not feel like she is been doing differently in terms of her insulin regimen.  We had pursued freestyle emily on her this past year though apparently did run into obstacles in terms of working with the device company.  Describes having cold-like symptoms now for the past 5 days.  Describes a chest tightness with some dyspnea.  Some cough no real production.  No described fever chills.  She does say her  has had similar symptoms.    7/22/2020: Yoselyn returns for follow-up.  She has been using a freestyle emily monitor since last visit.a she feels like this is been very helpful on being more accountable with her diet.  Actually has not really increased her kilo daily insulin dose.  She finds if she uses NovoLog beyond 15 units she is more prone to have postprandial lows.  Did briefly try a 7030 premixed though had significant issues with getting her blood sugars down.  Since has reverted back to basal bolus insulin.  We reviewed her findings she is to week and 30-day averages are a significant curve from her baseline.  A1c already improving now down to 7.9%.  Does describe some left ear fullness and diminished hearing acuity.  Does wear hearing aids bilaterally.  She is appropriately socially distancing.         Review of Systems   Constitutional:  No fever, No chills.    Eye:  Negative except as documented in history of present illness.    Ear/Nose/Mouth/Throat:       Decreased hearing: Bilaterally.    Respiratory:  Shortness of breath, No wheezing.    Cardiovascular:  No chest pain, No palpitations, No peripheral edema, No syncope.    Gastrointestinal:  No vomiting, No constipation, No heartburn, No abdominal pain.    Genitourinary:  No dysuria, No hematuria.    Hematology/Lymphatics:  Negative except as  documented in history of present illness.    Endocrine:  No excessive thirst, No polyuria.    Immunologic:  No recurrent fevers.    Musculoskeletal:  Joint pain, Decreased range of motion, No back pain, No neck pain, No muscle pain.    Neurologic:  Alert and oriented X4, No numbness, No tingling.       Health Status   Allergies:    Allergic Reactions (Selected)  Severity Not Documented  Actos (Gi upset,edema)  Avalide (Angioedema)  Avandia (Breathing problem)  Avelox (Shaky)  Benadryl (Hives)  Glucophage (Gi sx)  Lisinopril (Angioedema)  Naprosyn (Hives)  Penicillin (Hives)  PredniSONE (Hives)  Sulfa drug (Hives and nausea and vomitting)  Tylenol (Feet swelling)  Zithromax (Breathing trouble)  Nonallergic Reactions (Selected)  Unknown  Hydrochlorothiazide-irbesartan (Angioedema)  Ibuprofen (No reactions were documented)  Peanuts (Itching)   Medications:  (Selected)   Prescriptions  Prescribed  1/2 cc insulin syringes (uses 2 daily) and One touch test strips and lancets: 1/2 cc insulin syringes (uses 2 daily) and One touch test strips and lancets, See Instructions, Instructions: uses 2 daily, Supply, # 100 EA, 5 Refill(s), Type: Maintenance, Pharmacy: [id number], uses 2 daily  ONE TOUCH ULTRA TEST STRIPS: ONE TOUCH ULTRA TEST STRIPS, See Instructions, Instructions: TESTING qid - E11.9, Supply, # 400 EA, 3 Refill(s), Type: Maintenance, Pharmacy: [facility], keep on file and pt will notify when needed, TESTING qid - E11.9  One Touch Ultra Blood glucose meter: One Touch Ultra Blood glucose meter, See Instructions, Instructions: use as directed - E11.9, Supply, # 1 EA, 0 Refill(s), Type: Maintenance, Pharmacy: [facility], use as directed - E11.9  Ventolin HFA 90 mcg/inh inhalation aerosol: See Instructions, Instructions: 1-2 puff(s) Inhale q2-4hr, PRN: as needed for wheezing, # 1 EA, 1 Refill(s), Type: Maintenance, Pharmacy: [facility], 1-2 puff(s) Inhale q2-4hr,PRN:as needed  for wheezing  amLODIPine 5 mg oral tablet: = 1 tab(s), Oral, daily, # 90 tab(s), 1 Refill(s), Type: Maintenance, Pharmacy: [facility], keep on file-pt will notify when needed, 1 tab(s) Oral daily  aspirin 325 mg oral tablet: 1 tab(s) ( 325 mg ), PO, Daily, # 30 tab(s), 0 Refill(s), Type: Maintenance, OTC (Rx)  atenolol-chlorthalidone 50 mg-25 mg oral tablet: 0.5 tab(s), Oral, daily, # 45 tab(s), 1 Refill(s), Type: Maintenance, Pharmacy: [facility], keep on file-pt will notify when needed, 0.5 tab(s) Oral daily  atorvastatin 40 mg oral tablet: = 1 tab(s), Oral, daily, # 90 tab(s), 1 Refill(s), Type: Maintenance, Pharmacy: [facility], keep on file-pt will notify when needed, 1 tab(s) Oral daily  nitroglycerin 0.4 mg sublingual tablet: = 1 tab(s), SL, q5min, PRN: for chest pain, # 25 tab(s), 1 Refill(s), Type: Maintenance, Pharmacy: [facility], keep on hold and pt will notify when needed, 1 tab(s) SL q5 min,PRN:for chest pain  pantoprazole 40 mg oral delayed release tablet: = 1 tab(s) ( 40 mg ), po, daily, # 90 tab(s), 1 Refill(s), Type: Maintenance, Pharmacy: [place] [id number], keep on file and pt will notify when needed, 1 tab(s) Oral daily  short insulin pen needles: short insulin pen needles, See Instructions, Instructions: insulin shots 4x/day, Supply, # 400 EA, 3 Refill(s), Type: Maintenance, Pharmacy: [facility], keep on hold and pt will notify when needed, insulin shots 4x/day  traMADol 50 mg oral tablet: See Instructions, Instructions: TAKE 1 TABLET BY MOUTH EVERY 4 HOURS AS NEEDED FOR PAIN, # 30 tab(s), Type: Soft Stop, Pharmacy: [facility], TAKE 1 TABLET BY MOUTH EVERY 4 HOURS AS NEEDED FOR PAIN  Documented Medications  Documented  NovoLOG FlexPen: See Instructions, Instructions: 12-15 units Subcutaneous tidac, 0 Refill(s), Type: Maintenance  Gamaliel Louisar: ( 36 units ), Subcutaneous, daily, 0 Refill(s), Type: Maintenance,     Medications          *denotes recorded medication          ONE TOUCH ULTRA TEST STRIPS: See Instructions, TESTING qid - E11.9, 400 EA, 3 Refill(s).          short insulin pen needles: See Instructions, insulin shots 4x/day, 400 EA, 3 Refill(s).          1/2 cc insulin syringes (uses 2 daily) and One touch test strips and lancets: See Instructions, uses 2 daily, 100 EA.          One Touch Ultra Blood glucose meter: See Instructions, use as directed - E11.9, 1 EA, 0 Refill(s).          Ventolin HFA 90 mcg/inh inhalation aerosol: See Instructions, 1-2 puff(s) Inhale q2-4hr, PRN: as needed for wheezing, 1 EA, 1 Refill(s).          amLODIPine 5 mg oral tablet: 1 tab(s), Oral, daily, 90 tab(s), 1 Refill(s).          aspirin 325 mg oral tablet: 325 mg, 1 tab(s), PO, Daily, 30 tab(s).          atenolol-chlorthalidone 50 mg-25 mg oral tablet: 0.5 tab(s), Oral, daily, 45 tab(s), 1 Refill(s).          atorvastatin 40 mg oral tablet: 1 tab(s), Oral, daily, 90 tab(s), 1 Refill(s).          *NovoLOG FlexPen: See Instructions, 12-15 units Subcutaneous tidac, 0 Refill(s).          *Toujeo Max SoloStar: 36 units, Subcutaneous, daily, 0 Refill(s).          nitroglycerin 0.4 mg sublingual tablet: 1 tab(s), SL, q5min, PRN: for chest pain, 25 tab(s), 1 Refill(s).          pantoprazole 40 mg oral delayed release tablet: 40 mg, 1 tab(s), po, daily, 90 tab(s), 1 Refill(s).          traMADol 50 mg oral tablet: See Instructions, TAKE 1 TABLET BY MOUTH EVERY 4 HOURS AS NEEDED FOR PAIN, 30 tab(s).       Problem list:    All Problems  Acute low back pain / SNOMED CT 701535678 / Confirmed  Arteriosclerotic heart disease (ASHD) / SNOMED CT 89125048 / Confirmed  GERD (gastroesophageal reflux disease) / SNOMED CT 107022534 / Confirmed  Glaucoma / SNOMED CT 41883063 / Confirmed  Hypertension / SNOMED CT 0091060170 / Confirmed  Insulin long-term use / SNOMED CT 4295131173 / Confirmed  Hyperlipemia / SNOMED CT 181309175 / Confirmed  Background  diabetic retinopathy / SNOMED CT 5476700518 / Confirmed  Obesity / SNOMED CT 3909187779 / Probable  Diabetes mellitus, type 2 / SNOMED CT 342298567 / Confirmed  Inactive: Tobacco user / ICD-9-.1  Resolved: *[email or web address] - Acute low back pain, L4 nerve root impingement  Canceled: Long term current use of oral hypoglycemic drug / SNOMED CT 730414450  Canceled: Obese / ICD-9-.00      Histories   Past Medical History:    Active  Diabetes mellitus, type 2 (048007296): Onset in  at 51 years.  Comments:  2010 CST 3:00 PM CST - Jamey CARDENASPaula  started Insulin 2002  Background diabetic retinopathy (8198517861)  Arteriosclerotic heart disease (ASHD) (51950504)  Comments:  2010 CST 3:02 PM CST Adwoa Concepcion Paula CARDENAS  Coronary Angioplasty and Stents x 2    2010 CDT 2:14 PM CDT - Lew Huggins MD  &   GERD (gastroesophageal reflux disease) (023183863)  Hypertension (9142648394)  Hyperlipemia (780384622)  Acute low back pain (514038694)  Comments:  2012 CDT 10:46 AM Lew Christensen MD  L3-L4 disc with L4 nerve root impingement  Obesity (7897476123)  Glaucoma (04797816)  Comments:  2012 CDT 1:46 PM CDT - Brooke Barraza  Right eye  Resolved  *[email or web address] - Acute low back pain, L4 nerve root impingement: Onset on 2012 at 66 years.  Resolved.   Family History:    Suicide  Father ()  Kidney stone  Son (Elliot)  Diabetes mellitus type II  Mother ()  Asthma  Sister (Shira)  Heart attack  Mother ()  Son (Terrence)  CABG - Coronary artery bypass graft  Son (Terrence)  Coronary heart disease  Mother ()     Procedure history:    Esophagogastroduodenoscopy (728559245) on 2017 at 71 Years.  Comments:  2/10/2017 12:02 PM Vinayak Park MD  Gastric ulcer  Coronary angiogram (78278524) in the month of 2010 at 64 Years.  Comments:  2010 10:18 AM Lew Christensen MD  no intervention with stents  OD cataract with IOL in the month  of 12/2006 at 61 Years.  glaucoma surgery OD in the month of 3/2006 at 60 Years.  coronary angioplasty with stents in 2000 at 55 Years.  Coronary angioplasty (15052702) in 1997 at 52 Years.  DEE - Total abdominal hysterectomy and bilateral salpingo-oophorectomy (9927127347) in 1975 at 30 Years.  Comments:  4/21/2010 1:59 PM CDT - Hoda Fortune  benign fibroids  Cholecystectomy (10650497) in 1968 at 23 Years.  Appendectomy (592531055) in 1960 at 15 Years.  Tonsillectomy (588327681) in 1956 at 11 Years.  Dilatation and curettage (4869086744).  Comments:  4/21/2010 1:59 PM CDT - Hoda Fortune  benign   Social History:        Alcohol Assessment            Never      Tobacco Assessment            Past                     Comments:                      09/25/2012 - Brooke Barraza                     Quit in 2000      Employment and Education Assessment            Retired, Work/School description: [employer].        Physical Examination   vital signs stable, as noted above   Vital Signs   7/22/2020 10:58 AM CDT Temperature Tympanic 96 DegF  LOW    Peripheral Pulse Rate 56 bpm  LOW    Systolic Blood Pressure 136 mmHg  HI    Diastolic Blood Pressure 64 mmHg    Mean Arterial Pressure 88 mmHg      Measurements from flowsheet : Measurements   7/22/2020 10:58 AM CDT Height Measured - Standard 61.25 in    Weight Measured - Standard 169.12 lb    BSA 1.82 m2    Body Mass Index 31.69 kg/m2  HI      General:  Alert and oriented, No acute distress.    Eye:  Pupils are equal, round and reactive to light, Extraocular movements are intact, Normal conjunctiva.    HENT:  Normocephalic, Oral mucosa is moist, ceruminosis bilaterally.    Neck:  Supple, Non-tender, No carotid bruit, No jugular venous distention.    Respiratory:  Lungs are clear to auscultation, Respirations are non-labored.    Cardiovascular:  Normal rate, Regular rhythm, No murmur, No edema.    Gastrointestinal:  Soft, Non-tender.    Musculoskeletal:  Normal strength.     Neurologic:  Alert, Oriented, Normal motor function, No focal deficits, Cranial Nerves II-XII are grossly intact.    Cognition and Speech:  Oriented, Speech clear and coherent, Functional cognition intact.    Psychiatric:  Appropriate mood & affect.       Review / Management   Results review:  Lab results   7/16/2020 10:06 AM CDT Sodium Level 141 mmol/L    Potassium Level 4.9 mmol/L    Chloride Level 107 mmol/L    CO2 Level 28 mmol/L    Glucose Level 96 mg/dL    BUN 24 mg/dL    Creatinine 1.47 mg/dL  HI    BUN/Creat Ratio 16    eGFR 34 mL/min/1.73m2  LOW    eGFR African American 40 mL/min/1.73m2  LOW    Calcium Level 10.1 mg/dL    Hgb A1c 7.9  HI    Cholesterol 161 mg/dL    Non-HDL 97    HDL 64 mg/dL    Chol/HDL Ratio 2.5    LDL 80    Triglyceride 83 mg/dL    U Creatinine 79 mg/dL    U Microalbumin 2.4 mg/dL    Ur Microalb/Creat Ratio 30  HI   .       Impression and Plan   Diagnosis     Obesity (IHW11-IQ E66.9).     Diabetes Mellitus, Type 2 (YFM46-DL E11.9).     Background Diabetic Retinopathy (FWZ66-AM E11.311).     Arteriosclerotic Heart Disease (ASHD) (SUI95-NN I25.10).     Hyperlipemia (ANT95-BJ E78.2).           .) ceruminosis - successful ear lavage bilaterally by nursing staff    .) GERD/h/o esophagitis   - EGD in 12/2016 showing gastric ulcer - previous symptoms now resolved   - pantoprazole 40mg daily   - denies any NSAIDs    .) type II DM, controlled   hypoglycemic medications: intolerant to metformin due to GI symptoms  insulin therapy: Toujeo 35 units, Humalog 12-15 units TID + SSI   - ICR 1:3, sensitivity 10 (~20 units q meal)   - could consider future GLP1 agonist   - using CGM/Freestyle Adry and seeing significant benefits from use  last HbA1c: 7.9%   microvascular complications: proliferative retinopathy - sees ophtho regularly, mo microalbuminuria  macrovascular complications: CAD  ASA/GILDA/statin:  ASA 325mg daily, no microalbuminuira, atorvastatin 40mg    - previous myalgias on higher dose of  statin    Patient has met criteria for CGM coverage;   - patient has type 2 diabetes mellitus; and  - patient has been using a blood glucose monitor and performing four or more blood glucose test per day; and  - patient is insulin treated with multiple daily injections of three or more times per day; and  - patient is requiring frequent insulin adjustments on the basis of blood glucose readings  - within six months prior to ordering the CGM, the patient has had an in-person visit with the practitioner; and determined that criteria (1-4) above are met; and   - every six months following the initial prescription of the CGM, the treating practitioner has an in-person visit with the patient to assess adherence to their CGM regimen and diabetes treatment plan     .) HTN, controlled  current antihypertensive regimen: atenolol/chlorthalidone 25/12.5mg, amlodipine 5mg,   regimen changes:  intolerance:  future titration/work-up plan: low threshold to change/add ACEI if development of microalbuminuria    .) CAD   - Lexiscan (12/2016) - no reversible defects   - previously intolerant to higher dose ISMN, she does have known atherostenotic disease involving LCx   - ASA, atenolol 25mg daily, atorvastatin 40mg QHS,    - lower threshold for PCI given historic intolerance to long-acting nitrates    .) Health maintenance   - declines any means of CRC screening   - DEXA '18: osteopenia   - prior DEE   - q2 yr mammography   - declines adult vaccinations except for pneumococcal series    RTC in 6 months

## 2022-02-16 NOTE — PROGRESS NOTES
Patient:   ALEJANDRA HERRERA            MRN: [id number]            FIN: [id number]               Age:   72 years     Sex:  Female     :  1945   Associated Diagnoses:   Obesity; Diabetes Mellitus, Type 2; Background Diabetic Retinopathy; Arteriosclerotic Heart Disease (ASHD); Hyperlipemia   Author:   Edgardo Luong MD      Visit Information      Date of Service: 2017 10:25 am  Performing Location: [facility]  Encounter#: [id number]      Primary Care Provider (PCP):  Edgardo Luogn MD    NPI# [id number]      Referring Provider:  Edgardo Luong MD, NPI# [id number]      Chief Complaint            Additional Information:No additional information recorded during visit.   Chief complaint and symptoms as noted above and confirmed with patient      History of Present Illness   2014: Alejandra presents to clinic to establish care, previously followed by JEB.  She has a history of type 2 diabetes historically with uncontrolled hyperglycemia with hemoglobin A1c consistently above 9%, until approximately 1-1/2 years ago she was transitioned to Lantus basal and Lispro bolus insulin dosing with significant improvement in her glycemic control. Currently taking Lantus 40 units nightly, Humalog 15 units 3 times a day before meals.  She has a history of retinopathy, no known peripheral neuropathy or nephropathy.  She has remote history of atherosclerotic coronary artery disease, undergoing angioplasty in the mid 90s.  Denies any anginal equivalent symptoms.  She has never undergone screening for colorectal cancer. Labs reviewed with her.     2017: Returns for follow-up related to her type 2 diabetes.  Her hemoglobin A1c has improved by half a percent though remains above goal.  Does not bring any blood sugar log for review the states that morning sugars typically are controlled.  Currently taking Toujeo 35 units at night.  Currently following insulin to carbohydrate ratio of 1:5.  She is working with the care  coordinators.  Expresses no interest in colorectal cancer screening.  Prefers every 2 year mammography         Review of Systems   Constitutional:  No fever, No chills.    Eye:  Negative except as documented in history of present illness.    Ear/Nose/Mouth/Throat:  Negative except as documented in history of present illness.    Respiratory:  No shortness of breath.    Cardiovascular:  Chest pain, No palpitations, No peripheral edema, No syncope.    Gastrointestinal:  No vomiting, No constipation, No heartburn, No abdominal pain.    Genitourinary:  No dysuria, No hematuria.    Hematology/Lymphatics:  Negative except as documented in history of present illness.    Endocrine:  No excessive thirst, No polyuria.    Immunologic:  No recurrent fevers.    Musculoskeletal:  Back pain, No neck pain, No joint pain, No muscle pain.    Neurologic:  Alert and oriented X4, No numbness, No tingling.       Health Status   Allergies:    Allergic Reactions (Selected)  Severity Not Documented  Actos (Gi upset,edema)  Avalide (Angioedema)  Avandia (Breathing problem)  Avelox (Shaky)  Benadryl (Hives)  Glucophage (Gi sx)  Lisinopril (Angioedema)  Naprosyn (Hives)  Penicillin (Hives)  PredniSONE (Hives)  Sulfa drug (Hives)  Tylenol (Feet swelling)  Zithromax (Breathing trouble)   Medications:  (Selected)   Prescriptions  Prescribed  1/2 cc insulin syringes (uses 2 daily) and One touch test strips and lancets: 1/2 cc insulin syringes (uses 2 daily) and One touch test strips and lancets, See Instructions, Instructions: uses 2 daily, Supply, # 100 EA, 5 Refill(s), Type: Maintenance, Pharmacy: [facility] #[id number], uses 2 daily  NovoLOG FlexPen 100 units/mL subcutaneous solution: ( 15 unit(s) ), subcutaneous, tidac, # 45 mL, 2 Refill(s), Type: Maintenance, Pharmacy: [facility] #[id number], Due to insurance needing to change from Humalog to Novolog, 15 unit(s) subcutaneous tidac  Toujeo SoloStar 300 units/mL subcutaneous solution: See  Instructions, Instructions: INJECT 35 UNITS SUBCUTANEOUSLY ONCE DAILY. MAY INCREASE 1 UNIT UNTIL SUGAR IS BELOW 130, # 6 mL, 5 Refill(s), Type: Maintenance, Pharmacy: [facility] #[id number]  amLODIPine 5 mg oral tablet: 1 tab(s), PO, Daily, # 90 tab(s), 3 Refill(s), Type: Maintenance, Pharmacy: [facility] #[id number], 1 tab(s) po daily  aspirin 325 mg oral tablet: 1 tab(s) ( 325 mg ), PO, Daily, # 30 tab(s), 0 Refill(s), Type: Maintenance, OTC (Rx)  atenolol-chlorthalidone 50 mg-25 mg oral tablet: 0.5 tab, PO, Daily, # 135 tab(s), 3 Refill(s), Type: Maintenance, Pharmacy: [facility] #[id number], 0.5 tab po daily  atorvastatin 40 mg oral tablet: 1 tab(s) ( 40 mg ), po, daily, # 90 tab(s), 3 Refill(s), Type: Maintenance, Pharmacy: [facility] #[id number], 1 tab(s) po daily  codeine-guaifenesin 10 mg-100 mg/5 mL oral syrup: 5 mL, PO, q4-6 hrs, PRN: for cough, # 240 mL, 0 Refill(s), Type: Maintenance, Pharmacy: [facility] #[id number], 5 mL po q4-6 hrs,PRN:for cough  nitroglycerin 0.4 mg sublingual tablet: 1 tab(s), SL, q5min, PRN: for chest pain, # 25 tab(s), 1 Refill(s), Type: Maintenance, Pharmacy: [facility] #[id number], keep on hold and pt will notify when needed, 1 tab(s) sl q5 min,PRN:for chest pain  pantoprazole 40 mg oral delayed release tablet: 1 tab(s) ( 40 mg ), po, daily, # 30 tab(s), 11 Refill(s), Type: Maintenance, Pharmacy: [facility] #[id number]  short insulin pen needles: short insulin pen needles, See Instructions, Instructions: insulin shots 4x/day, Supply, # 400 EA, 3 Refill(s), Type: Maintenance, Pharmacy: [facility] #[id number], insulin shots 4x/day   Problem list:    All Problems  Acute Low Back Pain / ICD-9-.2 / Confirmed  Arteriosclerotic Heart Disease (ASHD) / ICD-9-.00 / Confirmed  Background Diabetic Retinopathy / ICD-9-.50 / Confirmed  Diabetes Mellitus, Type 2 / ICD-9-.00 / Confirmed  GERD (Gastroesophageal Reflux Disease) / ICD-9-.81 / Confirmed  Glaucoma / ICD-9-CM  365.9 / Confirmed  Hypertension / SNOMED CT 4268235470 / Confirmed  Hyperlipemia / SNOMED CT 552221247 / Confirmed  Obesity / ICD-9-.00 / Probable  Inactive: Tobacco user / ICD-9-.1  Resolved: *Hospitalized@[facility] - Acute low back pain, L4 nerve root impingement  Canceled: Obese / ICD-9-.00      Histories   Past Medical History:    Active  Hypertension (1128478818)  Hyperlipemia (458738497)  Glaucoma (365.9)  Comments:  2012 CDT 1:46 PM CDT - Brooke Barraza  Right eye  Resolved  *Hospitalized@[facility] - Acute low back pain, L4 nerve root impingement: Onset on 2012 at 66 years.  Resolved.   Family History:    Suicide  Father ()  Kidney stone  Son (Elliot)  Diabetes mellitus type II  Mother ()  Asthma  Sister (Shira)  Heart attack  Mother ()  Son (Terrence)  CABG - Coronary artery bypass graft  Son (Terrence)  Coronary heart disease  Mother ()     Procedure history:    Esophagogastroduodenoscopy (584327023) on 2017 at 71 Years.  Comments:  2/10/2017 12:02 PM - Vinayak Lei MD  Gastric ulcer  Coronary angiogram (34505678) in the month of 2010 at 64 Years.  Comments:  2010 10:18 AM - Lew Huggins MD  no intervention with stents  OD cataract with IOL in the month of 2006 at 61 Years.  glaucoma surgery OD in the month of 3/2006 at 60 Years.  coronary angioplasty with stents in  at 55 Years.  Coronary angioplasty (03478141) in  at 52 Years.  DEE - Total abdominal hysterectomy and bilateral salpingo-oophorectomy (9312435065) in  at 30 Years.  Comments:  2010 1:59 PM - Hoda Fortune  benign fibroids  Cholecystectomy (21093221) in  at 23 Years.  Appendectomy (234820148) in  at 15 Years.  Tonsillectomy (680481347) in  at 11 Years.  Dilatation and curettage (9565855790).  Comments:  2010 1:59 PM - Hoda Fortune  benign   Social History:        Alcohol Assessment: Denies Alcohol Use      Tobacco Assessment            Past                      Comments:                      09/25/2012 - Brooke Barraza                     Quit in 2000      Employment and Education Assessment            Retired, Work/School description: [employer].        Physical Examination   vital signs stable, as noted above   VS/Measurements   General:  Alert and oriented, No acute distress.    Eye:  Pupils are equal, round and reactive to light, Extraocular movements are intact, Normal conjunctiva.    HENT:  Normocephalic, Oral mucosa is moist.    Neck:  Supple, No carotid bruit, No jugular venous distention, No lymphadenopathy.    Respiratory:  Lungs are clear to auscultation, Respirations are non-labored.    Cardiovascular:  Normal rate, Regular rhythm, No murmur, No edema.    Gastrointestinal:  Soft, Non-tender, no epigastric or RUQ tenderness.    Musculoskeletal:  Normal strength.    Neurologic:  Alert, Oriented, Normal motor function, No focal deficits, Cranial Nerves II-XII are grossly intact.    Cognition and Speech:  Oriented, Speech clear and coherent, Functional cognition intact.    Psychiatric:  Appropriate mood & affect.       Review / Management   Results review:  Lab results: 9/19/2017 10:03 AM CDT   Hgb A1c                   8.2  HI  .       Impression and Plan   Diagnosis     Obesity (QII34-NV E66.9).     Diabetes Mellitus, Type 2 (BOU80-MM E11.9).     Background Diabetic Retinopathy (ZBE64-ZH E11.329).     Arteriosclerotic Heart Disease (ASHD) (TAE40-GP I25.10).     Hyperlipemia (TZL90-BC E78.2).         .) CAD   - Lexiscan (12/2016) - no reversible defects   - referral to cardiology (last saw Dr. Alberto in '11)   - previously intolerant to higher dose ISMN, she does have known atherostenotic disease involving LCx   - ASA, atenolol 25mg daily, atorvastatin 40mg QHS,    - lower threshold for PCI given historic intolerance to long-acting nitrates    .) GERD/esophagitis   - EGD in 12/2016 showing gastric ulcer - previous symptoms now resolved   -  pantoprazole 40mg daily   - denies any NSAIDs    .) type II DM, uncontrolled  hypoglycemic medications: intolerant to metformin due to GI symptoms  insulin therapy: Toujeo 35 units, Humalog 12-15 units TID + SSI   - ICR 1:4 and sensitivity of 15mg/dL   - could consider future GLP1 agonist  last HbA1c: 8.2%   microvascular complications: retinopathy, mo microalbuminuria  macrovascular complications: CAD  ASA/GILDA/statin:  ASA 325mg daily, no microalbuminuira, atorvastatin 40mg    - previous myalgias on higher dose of statin    .) HTN, controlled  current antihypertensive regimen: atenolol/chlorthalidone 25/12.5mg, amlodipine 5mg,   regimen changes:  intolerance:  future titration/work-up plan: low threshold to change/add ACEI if development of microalbuminuria    .) Health maintenance   - declines any means of CRC screening   - normal DEXA 10/2013, due in 2018   - prrior DEE   - q2 yr mammography   - enrolled in CCM program    RTC in 6 months

## 2022-02-16 NOTE — LETTER
(Inserted Image. Unable to display)   February 09, 2020      ALEJANDRA HERRERA   [place], [place] [id number]        Dear ALEJANDRA,     Thank you for selecting [facility] ([facility]viously [facility] for your healthcare needs. Below you will find the results of your recent test(s) done at our clinic.       Labs for your review.  We can discuss in more detail at future clinic visit.       Result Name Current Result Previous Result Reference Range   Hgb A1c ((H)) 9.6 2/4/2020 ((H)) 8.1 9/13/2019  - <5.7       Please contact me or my assistant at [phone] if you have any questions or concerns.     Sincerely,        Edgardo Luong MD    What do your labs mean?  Below is a glossary of commonly ordered labs:  LDL - Bad Cholesterol  HDL - Good Cholesterol  AST/ALT - Liver Function  Cr/Creatinine - Kidney Function  Microalbumin - Kidney Function  BUN - Kidney Function  PSA - Prostate   TSH - Thyroid Hormone  HgbA1c - Diabetes Test  Hgb (Hemoglobin) - Red Blood Cells

## 2022-02-16 NOTE — LETTER
(Inserted Image. Unable to display)   January 22, 2019      ALEJANDRA HERRERA   [place], [place] [id number]               Result Name Current Result Previous Result Reference Range   Lab Report   1/15/2019   3/12/2018    Cholesterol (mg/dL)  181 1/15/2019  191 2/6/2018  - <200   HDL (mg/dL)  74 1/15/2019  74 2/6/2018 >50 -    Triglyceride (mg/dL)  105 1/15/2019  127 2/6/2018  - <150   LDL  87 1/15/2019  95 2/6/2018    Cholesterol/HDL Ratio  2.4 1/15/2019  2.6 2/6/2018  - <5.0   Non-HDL Cholesterol  107 1/15/2019  117 2/6/2018  - <130   Hgb A1c ((H)) 8.3 1/15/2019 ((H)) 7.9 10/8/2018  - <5.7   Glucose Level (mg/dL) ((H)) 108 1/15/2019 ((H)) 183 3/12/2018 65 - 99   BUN (mg/dL) ((H)) 34 1/15/2019 ((H)) 28 3/12/2018 7 - 25   Creatinine Level (mg/dL) ((H)) 1.66 1/15/2019 ((H)) 1.57 3/12/2018 0.60 - 0.93   eGFR (mL/min/1.73m2) ((L)) 30 1/15/2019 ((L)) 32 3/12/2018 > OR = 60 -    eGFR  (mL/min/1.73m2) ((L)) 35 1/15/2019 ((L)) 38 3/12/2018 > OR = 60 -    BUN/Creat Ratio  20 1/15/2019  18 3/12/2018 6 - 22   Sodium Level (mmol/L)  141 1/15/2019  138 3/12/2018 135 - 146   Potassium Level (mmol/L)  4.6 1/15/2019  4.6 3/12/2018 3.5 - 5.3   Chloride Level (mmol/L)  108 1/15/2019  104 3/12/2018 98 - 110   CO2 Level (mmol/L)  26 1/15/2019  25 3/12/2018 20 - 32   Calcium Level (mg/dL)  9.8 1/15/2019  9.8 3/12/2018 8.6 - 10.4   Ur Creatinine (mg/dL)  143 1/15/2019  20 - 275   U Microalbumin (mg/dL)  3.4 1/15/2019  See Note: -    Ur Microalbumin/Creatinine Ratio  24 1/15/2019   - <30         Sincerely,        Edgardo Luong MD

## 2022-02-16 NOTE — NURSING NOTE
Comprehensive Intake Entered On:  5/28/2019 11:40 AM CDT    Performed On:  5/28/2019 11:36 AM CDT by Paula Concepcion CMA               Summary   Chief Complaint :   f/u chronic   Systolic Blood Pressure :   122 mmHg   Diastolic Blood Pressure :   60 mmHg   Mean Arterial Pressure :   81 mmHg   Peripheral Pulse Rate :   56 bpm (LOW)    Temperature Tympanic :   97.3 DegF(Converted to: 36.3 DegC)  (LOW)    Race :      Languages :   English   Paula Concepcion CMA - 5/28/2019 11:36 AM CDT   Health Status   Allergies Verified? :   Yes   Medication History Verified? :   Yes   Medical History Verified? :   Yes   Pre-Visit Planning Status :   Completed   Tobacco Use? :   Former smoker   Paula Concepcion CMA - 5/28/2019 11:36 AM CDT   Social History   Social History   (As Of: 5/28/2019 11:40:18 AM CDT)   Alcohol:        Never   (Last Updated: 1/24/2019 5:51:28 PM CST by Kathryn Worrell)          Tobacco:        Past   (Last Updated: 4/21/2010 2:07:14 PM CDT by Hoda Fortune CMA)    Comments:  9/25/2012 1:41 PM - Brooke Barraza: Quit in 2000   (Last Updated: 9/25/2012 1:41:36 PM CDT by Brooke Barraza)          Employment and Education:        Retired, Work/School description: [employer].   (Last Updated: 7/2/2010 10:15:57 AM CDT by Lew Huggins MD)

## 2022-02-16 NOTE — PROGRESS NOTES
Patient:   ALEJANDRA HERRERA            MRN: [id number]            FIN: [id number]               Age:   73 years     Sex:  Female     :  1945   Associated Diagnoses:   None   Author:   Carmen Patricia      Doctor: Edgardo Luong  Patient Information  (Medicare and address information is on file)  Medicare HICN#: _  Address:_ City:_ State:_ Zip:_  Home Phone:_ Work Phone:_ Other Contact Phone:_    Diabetes self-management training (DSMT) and medical nutrition therapy (MNT) are individual and complementary services to improve diabetes care. For Medicare beneficiaries, both services can be ordered in the same year. Research indicates MNT combined with DSMT improves outcomes.    Diabetes Self-Managment Training (DSMT)  Medicare: 10 hours initial DSMT in 12 month period, plus 2 hours follow-up annually  *Check type of training services and number of hours requested:  _ Initial DSMT:  10 hours or _ no. hrs. requested  X Follow-up DSMT: X 2 hours or _ no. hrs. requested  _ Additional insulin training: _ no. hrs. requested    *Patients with special needs requiring individual DSMT  Check all special needs that apply:  _ Vision _ Hearing  _ Physical  _Cognitive Impairment  _ Language limitations X Other  No classes offered    *DSMT Content  X All ten content areas, as appropriate  _ Monitoring diabetes   _ Diabetes as disease process  _ Psychological adjustment _ Physical activity  _ Nutritional management  _ Goal setting, problem solving  _ Medications     _ Prevent, detect and treat acute complications  _ Preconception/pregnancy _ Prevent, detect and teat chronic complications     management or gestational     diabetes management    Medical Nutrition Therapy (MNT)  Medicare: 3 hours initial MNT in the first calendar year, plus two hours follow-up MNT annually. Additional MNT hours available for change in medical condition, treament and/or diagnosis.  *Check the type of MNT and/or number of additional hours requested:  _ Initial  MNT: X Annual follow-up MNT  _ Additional MTNservices in the same calendar year, per RD recommendations  _ no. additional hrs. requested    Please specify change in medical condition, treatment and/or diagnosis      *Diagnosis  Please send recent labs for patient eligibility & outcomes monitoring  _ Type 1 uncontrolled _ Type 1 controlled  X Type 2 uncontrolled _ Type 2 controlled  _ Gestational diabetes _ Other _    Complications/Comorbidities  Check all that apply:  X Hypertension   X Hyperlipidemia _ Stroke  _ Neuropathy   _ Nephropathy  _ PVD  _ Renal disease   X Retinopathy  _ CHD  _ Non-healing wound _ Pregnancy   X Obesity  _ Mental/affective disorder      _ Other _    Current Diabetes Medications  Specify type, dose and frequency  Oral: _  Insulin: Toujeo 40u (36 - 38) at HS, Novolog 10-12u TID ac   Patient now uses: x Pen _ Needle _ Pump    Patient Behavior Goals/Plan of Care    To gradually lose weight and improve blood sugar control, while minimizing the risk for hypoglycemia and prevention of further complications related to uncontrolled diabetes     Signature and UPIN #: (UPIN and NPI are on file)  Group/Practice name, address and phone: [facility], [address] [place] [address]  [phone]

## 2022-02-16 NOTE — PROGRESS NOTES
Patient:   ALEJANDRA HERRERA            MRN: [id number]            FIN: [id number]               Age:   73 years     Sex:  Female     :  1945   Associated Diagnoses:   Obesity; Diabetes Mellitus, Type 2; Background Diabetic Retinopathy; Arteriosclerotic Heart Disease (ASHD); Hyperlipemia   Author:   Edgardo Luong MD      Visit Information      Date of Service: 2019 11:23 am  Performing Location: [facility]  Encounter#: [id number]      Primary Care Provider (PCP):  Edgardo Luong MD# [id number]      Referring Provider:  Edgardo Luong MD# [id number]      Chief Complaint   2019 11:36 AM CDT   f/u chronic              Additional Information:No additional information recorded during visit.   Chief complaint and symptoms as noted above and confirmed with patient      History of Present Illness   2014: Alejandra presents to clinic to establish care, previously followed by [facility].  She has a history of type 2 diabetes historically with uncontrolled hyperglycemia with hemoglobin A1c consistently above 9%, until approximately 1-1/2 years ago she was transitioned to Lantus basal and Lispro bolus insulin dosing with significant improvement in her glycemic control. Currently taking Lantus 40 units nightly, Humalog 15 units 3 times a day before meals.  She has a history of retinopathy, no known peripheral neuropathy or nephropathy.  She has remote history of atherosclerotic coronary artery disease, undergoing angioplasty in the mid 90s.  Denies any anginal equivalent symptoms.  She has never undergone screening for colorectal cancer. Labs reviewed with her.     2019:  Presents for general diabetic follow-up.  Overall doing well.  No specific complaints or concerns.  Tolerating insulin therapy without issues.  Denies any regular glycemia.    2019: Alejandra presents for regular follow-up.  Overall doing well.  She has been following her provided flowsheet for prandial insulin dosing.  Currently  following insulin to carbohydrate ratio of 1-4 and sensitivity of 15.  Feeling more comfortable with carb counting.  Denies any recurrent hypoglycemia.  Feeling well otherwise.  Denies any chest pain or shortness of breath.  Working with a chiropractor regularly related to chronic hip and low back pain         Review of Systems   Constitutional:  No fever, No chills.    Eye:  Negative except as documented in history of present illness.    Ear/Nose/Mouth/Throat:  Negative except as documented in history of present illness.    Respiratory:  Shortness of breath.    Cardiovascular:  No chest pain, No palpitations, No peripheral edema, No syncope.    Gastrointestinal:  No vomiting, No constipation, No heartburn, No abdominal pain.    Genitourinary:  No dysuria, No hematuria.    Hematology/Lymphatics:  Negative except as documented in history of present illness.    Endocrine:  No excessive thirst, No polyuria.    Immunologic:  No recurrent fevers.    Musculoskeletal:  Back pain, Joint pain, Muscle pain, Decreased range of motion, No neck pain.    Neurologic:  Alert and oriented X4, No numbness, No tingling.       Health Status   Allergies:    Allergic Reactions (Selected)  Severity Not Documented  Actos (Gi upset,edema)  Avalide (Angioedema)  Avandia (Breathing problem)  Avelox (Shaky)  Benadryl (Hives)  Glucophage (Gi sx)  Lisinopril (Angioedema)  Naprosyn (Hives)  Penicillin (Hives)  PredniSONE (Hives)  Sulfa drug (Hives and nausea and vomitting)  Tylenol (Feet swelling)  Zithromax (Breathing trouble)  Nonallergic Reactions (Selected)  Unknown  Hydrochlorothiazide-irbesartan (Angioedema)  Ibuprofen (No reactions were documented)  Peanuts (Itching)   Medications:  (Selected)   Prescriptions  Prescribed  1/2 cc insulin syringes (uses 2 daily) and One touch test strips and lancets: 1/2 cc insulin syringes (uses 2 daily) and One touch test strips and lancets, See Instructions, Instructions: uses 2 daily, Supply, # 100 EA, 5  Refill(s), Type: Maintenance, Pharmacy: [id number], uses 2 daily  NovoLOG FlexPen 100 units/mL injectable solution: See Instructions, Instructions: INJECT 15 UNITS SUBCUTANEOUSLY THREE TIMES DAILY BEFORE MEALS, # 45 mL, 3 Refill(s), Type: Soft Stop, Pharmacy: [facility] #[id number], keep on file, INJECT 15 UNITS SUBCUTANEOUSLY THREE TIMES DAILY BEFORE MEALS  ONE TOUCH ULTRA TEST STRIPS: ONE TOUCH ULTRA TEST STRIPS, See Instructions, Instructions: TESTING qid - E11.9, Supply, # 400 EA, 3 Refill(s), Type: Maintenance, Pharmacy: [place] [id number], keep on file and pt will notify when needed, TESTING qid - E11.9  One Touch Ultra Blood glucose meter: One Touch Ultra Blood glucose meter, See Instructions, Instructions: use as directed - E11.9, Supply, # 1 EA, 0 Refill(s), Type: Maintenance, Pharmacy: [place] [id number], use as directed - E11.9  Toujeo SoloStar 300 units/mL subcutaneous solution: See Instructions, Instructions: INJECT 35 UNITS SUBCUTANEOUSLY ONCE DAILY. MAY INCREASE 1 UNIT UNTIL SUGAR IS BELOW 130, # 46 mL, 3 Refill(s), Type: Soft Stop, Pharmacy: [place] [address], INJECT 35 UNITS SUBCUTANEOUSLY ONCE DAILY. MAY INCREASE 1...  amLODIPine 5 mg oral tablet: = 1 tab(s), PO, Daily, # 90 tab(s), 3 Refill(s), Type: Maintenance, Pharmacy: [facility] #[id number], keep on file and pt will notify when needed, 1 tab(s) Oral daily  aspirin 325 mg oral tablet: 1 tab(s) ( 325 mg ), PO, Daily, # 30 tab(s), 0 Refill(s), Type: Maintenance, OTC (Rx)  atenolol-chlorthalidone 50 mg-25 mg oral tablet: 0.5 tab, PO, Daily, # 45 tab(s), 3 Refill(s), Type: Maintenance, Pharmacy: [facility] #[id number], keep on file and pt will notify when needed, 0.5 tab Oral daily  atorvastatin 40 mg oral tablet: = 1 tab(s) ( 40 mg ), po, daily, # 90 tab(s), 3 Refill(s), Type: Maintenance, Pharmacy: [id number], keep on file and pt will notify when needed, 1 tab(s) Oral daily  nitroglycerin 0.4 mg sublingual tablet: =  1 tab(s), SL, q5min, PRN: for chest pain, # 25 tab(s), 1 Refill(s), Type: Maintenance, Pharmacy: [facility] #[id number], keep on hold and pt will notify when needed, 1 tab(s) SL q5 min,PRN:for chest pain  pantoprazole 40 mg oral delayed release tablet: = 1 tab(s) ( 40 mg ), po, daily, # 90 tab(s), 3 Refill(s), Type: Maintenance, Pharmacy: [facility] #[id number], keep on file and pt will notify when needed, 1 tab(s) Oral daily  short insulin pen needles: short insulin pen needles, See Instructions, Instructions: insulin shots 4x/day, Supply, # 400 EA, 3 Refill(s), Type: Maintenance, Pharmacy: [facility] #[id number], keep on hold and pt will notify when needed, insulin shots 4x/day,    Medications          *denotes recorded medication          short insulin pen needles: See Instructions, insulin shots 4x/day, 400 EA, 3 Refill(s).          ONE TOUCH ULTRA TEST STRIPS: See Instructions, TESTING qid - E11.9, 400 EA, 3 Refill(s).          1/2 cc insulin syringes (uses 2 daily) and One touch test strips and lancets: See Instructions, uses 2 daily, 100 EA.          One Touch Ultra Blood glucose meter: See Instructions, use as directed - E11.9, 1 EA, 0 Refill(s).          amLODIPine 5 mg oral tablet: 1 tab(s), PO, Daily, 90 tab(s), 3 Refill(s).          aspirin 325 mg oral tablet: 325 mg, 1 tab(s), PO, Daily, 30 tab(s).          atenolol-chlorthalidone 50 mg-25 mg oral tablet: 0.5 tab, PO, Daily, 45 tab(s), 3 Refill(s).          atorvastatin 40 mg oral tablet: 40 mg, 1 tab(s), po, daily, 90 tab(s), 3 Refill(s).          NovoLOG FlexPen 100 units/mL injectable solution: See Instructions, INJECT 15 UNITS SUBCUTANEOUSLY THREE TIMES DAILY BEFORE MEALS, 45 mL, 3 Refill(s).          Toujeo SoloStar 300 units/mL subcutaneous solution: See Instructions, INJECT 35 UNITS SUBCUTANEOUSLY ONCE DAILY. MAY INCREASE 1 UNIT UNTIL SUGAR IS BELOW 130, 46 mL, 3 Refill(s).          nitroglycerin 0.4 mg sublingual tablet: 1 tab(s), SL, q5min, PRN:  for chest pain, 25 tab(s), 1 Refill(s).          pantoprazole 40 mg oral delayed release tablet: 40 mg, 1 tab(s), po, daily, 90 tab(s), 3 Refill(s).     Problem list:    All Problems  Acute low back pain / SNOMED CT 799895280 / Confirmed  Arteriosclerotic heart disease (ASHD) / SNOMED CT 17219156 / Confirmed  GERD (gastroesophageal reflux disease) / SNOMED CT 924153660 / Confirmed  Glaucoma / SNOMED CT 57159626 / Confirmed  Hypertension / SNOMED CT 2870380547 / Confirmed  Insulin long-term use / SNOMED CT 4093968778 / Confirmed  Hyperlipemia / SNOMED CT 138485285 / Confirmed  Background diabetic retinopathy / SNOMED CT 6282467794 / Confirmed  Obesity / SNOMED CT 4296559189 / Probable  Diabetes mellitus, type 2 / SNOMED CT 804327861 / Confirmed  Inactive: Tobacco user / ICD-9-.1  Resolved: *Hospitalized@[facility] - Acute low back pain, L4 nerve root impingement  Canceled: Long term current use of oral hypoglycemic drug / SNOMED CT 331872594  Canceled: Obese / ICD-9-.00      Histories   Past Medical History:    Active  Diabetes mellitus, type 2 (904307315): Onset in 1996 at 51 years.  Comments:  1/29/2010 CST 3:00 PM CST Adwoa Concepcion [facility]  started Insulin 2/2002  Background diabetic retinopathy (6900134525)  Arteriosclerotic heart disease (ASHD) (08500829)  Comments:  1/29/2010 CST 3:02 PM CST Adwoa Concepcion [facility]  Coronary Angioplasty and Stents x 2    11/1/2010 CDT 2:14 PM CDT - Lew Huggins MD  1997& 2000  GERD (gastroesophageal reflux disease) (915450197)  Hypertension (6005396816)  Hyperlipemia (608711302)  Acute low back pain (303745013)  Comments:  6/27/2012 CDT 10:46 AM CDT - Lew Huggins MD  L3-L4 disc with L4 nerve root impingement  Obesity (1292022528)  Glaucoma (60099396)  Comments:  9/25/2012 CDT 1:46 PM CDT - Brooke Barraza  Right eye  Resolved  *Hospitalized@[facility] - Acute low back pain, L4 nerve root impingement: Onset on 6/16/2012 at 66 years.  Resolved.   Family History:     Suicide  Father ()  Kidney stone  Son (Elliot)  Diabetes mellitus type II  Mother ()  Asthma  Sister (Shira)  Heart attack  Mother ()  Son (Terrence)  CABG - Coronary artery bypass graft  Son (Terrence)  Coronary heart disease  Mother ()     Procedure history:    Esophagogastroduodenoscopy (032003478) on 2017 at 71 Years.  Comments:  2/10/2017 12:02 PM CST - Quique STINSON, Vinayak  Gastric ulcer  Coronary angiogram (66535408) in the month of 2010 at 64 Years.  Comments:  2010 10:18 AM CDT - Lew Huggins MD  no intervention with stents  OD cataract with IOL in the month of 2006 at 61 Years.  glaucoma surgery OD in the month of 3/2006 at 60 Years.  coronary angioplasty with stents in  at 55 Years.  Coronary angioplasty (34920318) in  at 52 Years.  DEE - Total abdominal hysterectomy and bilateral salpingo-oophorectomy (6649077476) in  at 30 Years.  Comments:  2010 1:59 PM CDT - Hoda Fortune  benign fibroids  Cholecystectomy (90417505) in 1968 at 23 Years.  Appendectomy (953217167) in  at 15 Years.  Tonsillectomy (492126794) in  at 11 Years.  Dilatation and curettage (1896718627).  Comments:  2010 1:59 PM CDT - Hoda Fortune  benign   Social History:        Alcohol Assessment            Never      Tobacco Assessment            Past                     Comments:                      2012 - Brooke Barraza                     Quit in       Employment and Education Assessment            Retired, Work/School description: [employer].      Physical Examination   vital signs stable, as noted above   Vital Signs   2019 11:36 AM CDT Temperature Tympanic 97.3 DegF  LOW    Peripheral Pulse Rate 56 bpm  LOW    Systolic Blood Pressure 122 mmHg    Diastolic Blood Pressure 60 mmHg    Mean Arterial Pressure 81 mmHg      General:  Alert and oriented, No acute distress.    Eye:  Pupils are equal, round and reactive to light, Extraocular movements are  intact, Normal conjunctiva.    HENT:  Normocephalic, Oral mucosa is moist.    Neck:  Supple, No carotid bruit, No jugular venous distention, No lymphadenopathy.    Respiratory:  Lungs are clear to auscultation, Respirations are non-labored.    Cardiovascular:  Normal rate, Regular rhythm, No murmur, No edema.    Gastrointestinal:  Soft, Non-tender.    Musculoskeletal:  Normal strength, left with finger trigger finger.    Neurologic:  Alert, Oriented, Normal motor function, No focal deficits, Cranial Nerves II-XII are grossly intact.    Cognition and Speech:  Oriented, Speech clear and coherent, Functional cognition intact.    Psychiatric:  Appropriate mood & affect.       Review / Management   Results review:  Lab results: 5/15/2019 10:23 AM CDT   Hgb A1c                   8.5  HI  .       Impression and Plan   Diagnosis     Obesity (QGL90-EK E66.9).     Diabetes Mellitus, Type 2 (OWP88-AU E11.9).     Background Diabetic Retinopathy (PWD21-IA E11.311).     Arteriosclerotic Heart Disease (ASHD) (SIU09-HC I25.10).     Hyperlipemia (FTI84-CF E78.2).         .) CAD   - Lexiscan (12/2016) - no reversible defects   - previously intolerant to higher dose ISMN, she does have known atherostenotic disease involving LCx   - ASA, atenolol 25mg daily, atorvastatin 40mg QHS,    - lower threshold for PCI given historic intolerance to long-acting nitrates    .) GERD/esophagitis   - EGD in 12/2016 showing gastric ulcer - previous symptoms now resolved   - pantoprazole 40mg daily   - denies any NSAIDs    .) type II DM, uncontrolled  hypoglycemic medications: intolerant to metformin due to GI symptoms  insulin therapy: Toujeo 35 units, Humalog 12-15 units TID + SSI   - updated flowsheet for ICR 1:3, sensitivity 10 (~20 units q meal)   - could consider future GLP1 agonist  last HbA1c: 8.5%   microvascular complications: retinopathy, mo microalbuminuria  macrovascular complications: CAD  ASA/GILDA/statin:  ASA 325mg daily, no  microalbuminuira, atorvastatin 40mg    - previous myalgias on higher dose of statin    .) HTN, controlled  current antihypertensive regimen: atenolol/chlorthalidone 25/12.5mg, amlodipine 5mg,   regimen changes:  intolerance:  future titration/work-up plan: low threshold to change/add ACEI if development of microalbuminuria    .) Health maintenance   - declines any means of CRC screening   - DEXA '18: osteopenia   - prior DEE   - q2 yr mammography   - declines adult vaccinations except for pneumococcal series    RTC in 6 months

## 2022-02-16 NOTE — PROGRESS NOTES
Patient:   ALEJANDRA HERRERA            MRN: [id number]            FIN: [id number]               Age:   71 years     Sex:  Female     :  1945   Associated Diagnoses:   Obesity; Diabetes Mellitus, Type 2; Background Diabetic Retinopathy; Arteriosclerotic Heart Disease (ASHD); Hyperlipemia   Author:   Edgardo Luong MD      Visit Information      Date of Service: 2017 02:40 pm  Performing Location: [facility]  Encounter#: [id number]      Primary Care Provider (PCP):  Edgardo Luong MD    NPI# [id number]      Referring Provider:  No referring provider recorded for selected visit.      Chief Complaint   2017 3:03 PM CDT    Cough x Wed  SOB, non-productive cough              Additional Information:No additional information recorded during visit.   Chief complaint and symptoms as noted above and confirmed with patient      History of Present Illness   2014: Alejandra presents to clinic to establish care, previously followed by J[facility].  She has a history of type 2 diabetes historically with uncontrolled hyperglycemia with hemoglobin A1c consistently above 9%, until approximately 1-1/2 years ago she was transitioned to Lantus basal and Lispro bolus insulin dosing with significant improvement in her glycemic control. Currently taking Lantus 40 units nightly, Humalog 15 units 3 times a day before meals.  She has a history of retinopathy, no known peripheral neuropathy or nephropathy.  She has remote history of atherosclerotic coronary artery disease, undergoing angioplasty in the mid 90s.  Denies any anginal equivalent symptoms.  She has never undergone screening for colorectal cancer. Labs reviewed with her.     2016: Alejandra presents to clinic for follow-up related to her type 2 diabetes.  She states that if she takes Lantus 40 units in the evening, she consistently has low blood sugar recordings in the morning.  Typically eating only 2 meals per day; lunch and dinner.  She has been bolusing 15 units of  Humalog.  Also with complaints of postprandial chest discomfort.  Never with any association with activity.  Currently taking omeprazole 20 mg once daily.    12/15/2016: Presents to clinic for follow-up related to her diabetes.  She has been working with the care coordinators.  Finding a better insulin schedule.  Currently taking Humalog 12-14 units prior to meals.  She did recently switch over to Toujeo.  Brings up concerns related to ongoing postprandial chest discomfort.  She says the pain will come on after most meals.  Says that similar pains will occur though with any form of exertional activity.  She did try taking omeprazole twice daily without any substantial change in symptoms.  She has not tried taking any nitroglycerin.  She has a established history of coronary artery disease.    5/2/2017:  Returns to clinic for general follow-up.  Feeling well.  Previous back pains much improved.  No longer with chest pains.  Did complete nuclear stress testing this past December which was normal.  EGD showed ulcerative esophagitis.    5/19/2017: Presents to clinic with general malaise and nonproductive cough now for the past 3 days.  She shares that her  had a similar illness presenting to the clinic earlier in the week.  He now feels better.  No fever chills.  No described shortness of breath.  Just describes more of a tight cough that will break.  Using Delsym and Vicks VapoRub at home.  No history of chronic lung disease.         Review of Systems   Constitutional:  Fatigue, No fever, No chills.    Eye:  Negative except as documented in history of present illness.    Ear/Nose/Mouth/Throat:  Nasal congestion.    Respiratory:  Cough, No shortness of breath, No sputum production, No wheezing.    Cardiovascular:  Chest pain, No palpitations, No peripheral edema, No syncope.    Gastrointestinal:  Heartburn, No vomiting, No constipation, No abdominal pain.    Genitourinary:  No dysuria, No hematuria.     Hematology/Lymphatics:  Negative except as documented in history of present illness.    Endocrine:  No excessive thirst, No polyuria.    Immunologic:  No recurrent fevers.    Musculoskeletal:  Back pain, No neck pain, No joint pain, No muscle pain.    Neurologic:  Alert and oriented X4, Headache, No numbness, No tingling.       Health Status   Allergies:    Allergic Reactions (Selected)  Severity Not Documented  Actos (Gi upset,edema)  Avalide (Angioedema)  Avandia (Breathing problem)  Avelox (Shaky)  Benadryl (Hives)  Glucophage (Gi sx)  Lisinopril (Angioedema)  Naprosyn (Hives)  Penicillin (Hives)  PredniSONE (Hives)  Sulfa drug (Hives)  Tylenol (Feet swelling)  Zithromax (Breathing trouble)   Medications:  (Selected)   Prescriptions  Prescribed  1/2 cc insulin syringes (uses 2 daily) and One touch test strips and lancets: 1/2 cc insulin syringes (uses 2 daily) and One touch test strips and lancets, See Instructions, Instructions: uses 2 daily, Supply, # 100 EA, 5 Refill(s), Type: Maintenance, Pharmacy: [id number], uses 2 daily  NovoLOG FlexPen 100 units/mL subcutaneous solution: ( 15 unit(s) ), subcutaneous, tidac, # 45 mL, 2 Refill(s), Type: Maintenance, Pharmacy: [id number], Due to insurance needing to change from Humalog to Novolog, 15 unit(s) subcutaneous tidac  Protonix 40 mg oral delayed release tablet: 1 tab(s) ( 40 mg ), PO, daily, # 95 tab(s), 0 Refill(s), Type: Maintenance, Pharmacy: [id number], 1 tab(s) po daily  Toujeo SoloStar 300 units/mL subcutaneous solution: ( 35 unit(s) ), subcutaneous, daily, Instructions: Can increase by 1 unit until AM BS less than 130 if needed. This will replace Lantus, # 36 mL, 3 Refill(s), Type: Maintenance, Pharmacy: [id number], keep on hold and pt will notify when nee...  amLODIPine 5 mg oral tablet: 1 tab(s), PO, Daily, # 90 tab(s), 3 Refill(s), Type: Maintenance, Pharmacy: [id number], 1 tab(s) po daily  aspirin  325 mg oral tablet: 1 tab(s) ( 325 mg ), PO, Daily, # 30 tab(s), 0 Refill(s), Type: Maintenance, OTC (Rx)  atenolol-chlorthalidone 50 mg-25 mg oral tablet: 0.5 tab, PO, Daily, # 135 tab(s), 3 Refill(s), Type: Maintenance, Pharmacy: [id number], 0.5 tab po daily  atorvastatin 40 mg oral tablet: 1 tab(s) ( 40 mg ), po, daily, # 90 tab(s), 3 Refill(s), Type: Maintenance, Pharmacy: [id number], 1 tab(s) po daily  codeine-guaifenesin 10 mg-100 mg/5 mL oral syrup: 5 mL, PO, q4-6 hrs, PRN: for cough, # 240 mL, 0 Refill(s), Type: Maintenance, Pharmacy: [id number], 5 mL po q4-6 hrs,PRN:for cough  nitroglycerin 0.4 mg sublingual tablet: 1 tab(s), SL, q5min, PRN: for chest pain, # 25 tab(s), 1 Refill(s), Type: Maintenance, Pharmacy: [id number], keep on hold and pt will notify when needed, 1 tab(s) sl q5 min,PRN:for chest pain  short insulin pen needles: short insulin pen needles, See Instructions, Instructions: insulin shots 4x/day, Supply, # 400 EA, 3 Refill(s), Type: Maintenance, Pharmacy: [id number], insulin shots 4x/day   Problem list:    All Problems  Acute Low Back Pain / ICD-9-.2 / Confirmed  Arteriosclerotic Heart Disease (ASHD) / ICD-9-.00 / Confirmed  Background Diabetic Retinopathy / ICD-9-.50 / Confirmed  Diabetes Mellitus, Type 2 / ICD-9-.00 / Confirmed  GERD (Gastroesophageal Reflux Disease) / ICD-9-.81 / Confirmed  Glaucoma / ICD-9-.9 / Confirmed  Hypertension / SNOMED CT 7481872993 / Confirmed  Hyperlipemia / SNOMED CT 906102234 / Confirmed  Obesity / ICD-9-.00 / Probable  Inactive: Tobacco user / ICD-9-.1  Resolved: *Hospitalized@[email or web address] - Acute low back pain, L4 nerve root impingement  Canceled: Obese / ICD-9-.00      Histories   Past Medical History:    Active  Hypertension (9440904669)  Hyperlipemia (742013704)  Glaucoma (365.9)  Comments:  9/25/2012 CDT 1:46 PM CDT - Brooke Barraza  eye  Resolved  *[facility]ospitalized@[email or web address] - Acute low back pain, L4 nerve root impingement: Onset on 2012 at 66 years.  Resolved.   Family History:    Suicide  Father ()  Kidney stone  Son (Elliot)  Diabetes mellitus type II  Mother ()  Asthma  Sister (Shira)  Heart attack  Mother ()  Son (Terrence)  CABG - Coronary artery bypass graft  Son (Terrence)  Coronary heart disease  Mother ()     Procedure history:    Esophagogastroduodenoscopy (926397396) on 2017 at 71 Years.  Comments:  2/10/2017 12:02 PM - Vinayak Lei MD  Gastric ulcer  Coronary angiogram ([id number]) in the month of 2010 at 64 Years.  Comments:  2010 10:18 AM - Lew Huggins MD  no intervention with stents  OD cataract with IOL in the month of 2006 at 61 Years.  glaucoma surgery OD in the month of 3/2006 at 60 Years.  coronary angioplasty with stents in  at 55 Years.  Coronary angioplasty (68733892) in  at 52 Years.  DEE - Total abdominal hysterectomy and bilateral salpingo-oophorectomy (3940066945) in  at 30 Years.  Comments:  2010 1:59 PM - Hoda Fortune  benign fibroids  Cholecystectomy (69238260) in  at 23 Years.  Appendectomy (946165493) in  at 15 Years.  Tonsillectomy (565423319) in  at 11 Years.  Dilatation and curettage (9962911431).  Comments:  2010 1:59 PM - Hoda Fortune  benign   Social History:        Alcohol Assessment: Denies Alcohol Use      Tobacco Assessment            Past                     Comments:                      2012 - Brooke Barraza                     Quit in       Employment and Education Assessment            Retired, Work/School description: [employer].        Physical Examination   vital signs stable, as noted above   Vital Signs   2017 3:03 PM CDT Temperature Tympanic 97.4 DegF  LOW    Peripheral Pulse Rate 67 bpm    Systolic Blood Pressure 122 mmHg    Diastolic Blood Pressure 70 mmHg    Mean Arterial Pressure 87 mmHg    BP Site  Right arm    Oxygen Saturation 95 %      Measurements from flowsheet : Measurements   5/19/2017 3:03 PM CDT    Weight Measured - Standard                164.6 lb     General:  Alert and oriented, No acute distress.    Eye:  Pupils are equal, round and reactive to light, Extraocular movements are intact, Normal conjunctiva.    HENT:  Normocephalic, Tympanic membranes are clear, Oral mucosa is moist, No pharyngeal erythema, No sinus tenderness.    Neck:  Supple, No lymphadenopathy.    Respiratory:  Lungs are clear to auscultation, Respirations are non-labored, Breath sounds are equal, Symmetrical chest wall expansion.    Cardiovascular:  Normal rate, Regular rhythm, No murmur, No edema.    Gastrointestinal:  Soft, Non-tender, Non-distended.    Musculoskeletal:  Normal strength.    Neurologic:  Alert, Oriented.    Cognition and Speech:  Oriented, Speech clear and coherent.    Psychiatric:  Appropriate mood & affect.       Review / Management   Results review:  Lab results: 4/26/2017 8:42 AM CDT    Hgb A1c                   8.7  HI  .       Impression and Plan   Diagnosis     Obesity (WCN05-TF E66.9).     Diabetes Mellitus, Type 2 (QUU27-WT E11.9).     Background Diabetic Retinopathy (PXP12-WW E11.329).     Arteriosclerotic Heart Disease (ASHD) (LOZ06-HU I25.10).     Hyperlipemia (OHN04-ZW E78.2).         .) URI; no concerning findings on exam (clear lungs, no bronchospasm, afebrile, Spo2 normal)   - nothing suggestive of pneumonia; suspect viral process   - Rx for codeine/guafenesin, can continue with Delsym and recommended Mucinex   - general reassurance; reassess if symptoms worsening    plan as previously outlined:     .) CAD   - Lexiscan (12/2016) - no reversible defects   - referral to cardiology (last saw Dr. Alberto in '11)   - previously intolerant to higher dose ISMN, she does have known atherostenotic disease involving LCx   - ASA, atenolol 25mg daily, atorvastatin 40mg QHS,    - lower threshold for PCI given  historic intolerance to long-acting nitrates    .) GERD/esophagitis   - EGD in 12/2016 showing gastric ulcer - previous symptoms now resolved   - pantoprazole 40mg daily   - denies any NSAIDs    .) type II DM, uncontrolled  hypoglycemic medications: intolerant to metformin due to GI symptoms  insulin therapy: Toujeo 35 units, Humalog 12-15 units TID + SSI   - provided spreadsheet for ICR 1:5 and sensitivity of 15mg/dL  last HbA1c: 8.7%   microvascular complications: retinopathy, mo microalbuminuria  macrovascular complications: CAD  ASA/GILDA/statin:  ASA 325mg daily, no microalbuminuira, atorvastatin 40mg    - previous myalgias on higher dose of statin    .) HTN, historically controlled  current antihypertensive regimen: atenolol/chlorthalidone 25/12.5mg, amlodipine 5mg,   regimen changes:  intolerance:  future titration/work-up plan: low threshold to change/add ACEI if development of microalbuminuria    .) Health maintenance   - schedule Cologuard (has kit at home); she would be willing to do diagnostic colonoscopy if screening test is positive   - normal DEXA 10/2013, due in 2018   - prrior DEE   - annual mammography   - enrolled in CCM program    RTC as previously scheduled

## 2022-02-16 NOTE — PROGRESS NOTES
Patient:   ALEJANDRA HERRERA            MRN: [id number]            FIN: [id number]               Age:   72 years     Sex:  Female     :  1945   Associated Diagnoses:   Obesity; Diabetes Mellitus, Type 2; Background Diabetic Retinopathy; Arteriosclerotic Heart Disease (ASHD); Hyperlipemia   Author:   Edgardo Luong MD      Visit Information      Date of Service: 2018 10:31 am  Performing Location: [facility]  Encounter#: [id number]      Primary Care Provider (PCP):  Edgardo Luong MD, NPI# [id number]      Referring Provider:  Edgardo Luong MD# [id number]      Chief Complaint   2018 10:43 AM CDT   1.  DM check  2.  Back pain  (Modified)             Additional Information:No additional information recorded during visit.   Chief complaint and symptoms as noted above and confirmed with patient      History of Present Illness   2014: Alejandra presents to clinic to establish care, previously followed by JEB.  She has a history of type 2 diabetes historically with uncontrolled hyperglycemia with hemoglobin A1c consistently above 9%, until approximately 1-1/2 years ago she was transitioned to Lantus basal and Lispro bolus insulin dosing with significant improvement in her glycemic control. Currently taking Lantus 40 units nightly, Humalog 15 units 3 times a day before meals.  She has a history of retinopathy, no known peripheral neuropathy or nephropathy.  She has remote history of atherosclerotic coronary artery disease, undergoing angioplasty in the mid 90s.  Denies any anginal equivalent symptoms.  She has never undergone screening for colorectal cancer. Labs reviewed with her.     2017: Returns for follow-up related to her type 2 diabetes.  Her hemoglobin A1c has improved by half a percent though remains above goal.  Does not bring any blood sugar log for review the states that morning sugars typically are controlled.  Currently taking Toujeo 35 units at night.  Currently following  insulin to carbohydrate ratio of 1:5.  She is working with the care coordinators.  Expresses no interest in colorectal cancer screening.  Prefers every 2 year mammography    2/15/2018: Returns to clinic for follow-up related to her diabetes.  We reviewed her labs today.  A1c staying above range; currently 8.5%.  Not following a dedicated carb counting diet.  Taking to [facility] 35 units at night.  Typically taking Humalog 15 units with lunch and dinner.  Would like to see hand specialist related to left-sided fourth finger trigger finger.    6/20/2018: Presents for regular diabetic follow-up.  A1c mildly improved from last visit.  No significant change in lifestyle or insulin administration.  She has been using insulin dosing flow sheet.  Denies any hypoglycemia.  Still with limited insight into carbohydrate consumption.  Still drinking some simple sugar sodas.  Has been dealing with intermittent chronic low back pain.  Has worked with chiropractor in the past.  Looking to establish with local ophthalmology care.  Has been going to [place] for history of proliferative retinopathy and has received laser therapy.         Review of Systems   Constitutional:  No fever, No chills.    Eye:  Negative except as documented in history of present illness.    Ear/Nose/Mouth/Throat:  Negative except as documented in history of present illness.    Respiratory:  No shortness of breath.    Cardiovascular:  Chest pain, No palpitations, No peripheral edema, No syncope.    Gastrointestinal:  No vomiting, No constipation, No heartburn, No abdominal pain.    Genitourinary:  No dysuria, No hematuria.    Hematology/Lymphatics:  Negative except as documented in history of present illness.    Endocrine:  No excessive thirst, No polyuria.    Immunologic:  No recurrent fevers.    Musculoskeletal:  Decreased range of motion, No back pain, No neck pain, No joint pain, No muscle pain.    Neurologic:  Alert and oriented X4, No numbness, No tingling.        Health Status   Allergies:    Allergic Reactions (Selected)  Severity Not Documented  Actos (Gi upset,edema)  Avalide (Angioedema)  Avandia (Breathing problem)  Avelox (Shaky)  Benadryl (Hives)  Glucophage (Gi sx)  Lisinopril (Angioedema)  Naprosyn (Hives)  Penicillin (Hives)  PredniSONE (Hives)  Sulfa drug (Hives)  Tylenol (Feet swelling)  Zithromax (Breathing trouble)   Medications:  (Selected)   Prescriptions  Prescribed  1/2 cc insulin syringes (uses 2 daily) and One touch test strips and lancets: 1/2 cc insulin syringes (uses 2 daily) and One touch test strips and lancets, See Instructions, Instructions: uses 2 daily, Supply, # 100 EA, 5 Refill(s), Type: Maintenance, Pharmacy: [id number], uses 2 daily  NovoLOG FlexPen 100 units/mL injectable solution: See Instructions, Instructions: INJECT 15 UNITS SUBCUTANEOUSLY THREE TIMES DAILY BEFORE MEALS, # 15 mL, 1 Refill(s), Type: Soft Stop, Pharmacy: [id number]  ONETOUCH ULTRA BL   KITTY LIFE: See Instructions, Instructions: USE TO TEST BLOOD SUGAR 3 TIMES DAILY, # 100 unknown unit, 4 Refill(s), Type: Soft Stop, Pharmacy: [id number], USE TO TEST BLOOD SUGAR 3 TIMES DAILY  Toujeo SoloStar 300 units/mL subcutaneous solution: See Instructions, Instructions: INJECT 35 UNITS SUBCUTANEOUSLY ONCE DAILY. MAY INCREASE 1 UNIT UNTIL SUGAR IS BELOW 130, # 6 mL, 1 Refill(s), Type: Soft Stop, Pharmacy: [id number]  amLODIPine 5 mg oral tablet: 1 tab(s), PO, Daily, # 90 tab(s), 3 Refill(s), Type: Maintenance, Pharmacy: [id number], keep on file and pt will notify when needed, 1 tab(s) po daily  aspirin 325 mg oral tablet: 1 tab(s) ( 325 mg ), PO, Daily, # 30 tab(s), 0 Refill(s), Type: Maintenance, OTC (Rx)  atenolol-chlorthalidone 50 mg-25 mg oral tablet: 0.5 tab, PO, Daily, # 135 tab(s), 3 Refill(s), Type: Maintenance, Pharmacy: [facility] #[id number], keep on file and pt will notify when needed, 0.5 tab po daily  atorvastatin  40 mg oral tablet: 1 tab(s) ( 40 mg ), po, daily, # 90 tab(s), 3 Refill(s), Type: Maintenance, Pharmacy: [id number], keep on file and pt will notify when needed, 1 tab(s) po daily  nitroglycerin 0.4 mg sublingual tablet: 1 tab(s), SL, q5min, PRN: for chest pain, # 25 tab(s), 1 Refill(s), Type: Maintenance, Pharmacy: [id number], keep on hold and pt will notify when needed, 1 tab(s) sl q5 min,PRN:for chest pain  pantoprazole 40 mg oral delayed release tablet: 1 tab(s) ( 40 mg ), po, daily, # 90 tab(s), 3 Refill(s), Type: Maintenance, Pharmacy: [id number], keep on file and pt will notify when needed, 1 tab(s) po daily  short insulin pen needles: short insulin pen needles, See Instructions, Instructions: insulin shots 4x/day, Supply, # 400 EA, 3 Refill(s), Type: Maintenance, Pharmacy: [id number], insulin shots 4x/day  traMADol 50 mg oral tablet: 1 tab(s) ( 50 mg ), PO, q4hr, PRN: for pain, # 30 tab(s), 0 Refill(s), Type: Maintenance, Pharmacy: [id number], 1 tab(s) po q4 hrs,x7 day(s),PRN:for pain   Problem list:    All Problems  Acute low back pain / SNOMED CT 444857556 / Confirmed  Arteriosclerotic heart disease (ASHD) / SNOMED CT 84466122 / Confirmed  GERD (gastroesophageal reflux disease) / SNOMED CT 191574524 / Confirmed  Glaucoma / SNOMED CT 24546746 / Confirmed  Hypertension / SNOMED CT 1900781470 / Confirmed  Insulin long-term use / SNOMED CT 2940345796 / Confirmed  Hyperlipemia / SNOMED CT 160985089 / Confirmed  Background diabetic retinopathy / SNOMED CT 3843998372 / Confirmed  Obesity / SNOMED CT 5624614549 / Probable  Diabetes mellitus, type 2 / SNOMED CT 140098401 / Confirmed  Inactive: Tobacco user / ICD-9-.1  Resolved: *Hospitalized@[facility] - Acute low back pain, L4 nerve root impingement  Canceled: Long term current use of oral hypoglycemic drug / SNOMED CT 746777068  Canceled: Obese / ICD-9-.00      Histories   Past Medical History:     Active  Diabetes mellitus, type 2 (512287708): Onset in  at 51 years.  Comments:  2010 CST 3:00 PM CST - Jamey Paula CARDENAS  started Insulin 2002  Background diabetic retinopathy (1316613986)  Arteriosclerotic heart disease (ASHD) (28663621)  Comments:  2010 CST 3:02 PM CST - Jamey Paula CARDENAS  Coronary Angioplasty and Stents x 2    2010 CDT 2:14 PM CDT - Lew Huggins MD  &   GERD (gastroesophageal reflux disease) (453056574)  Hypertension (5123995008)  Hyperlipemia (284474975)  Acute low back pain (121676013)  Comments:  2012 CDT 10:46 AM CDT - Lew Huggins MD  L3-L4 disc with L4 nerve root impingement  Obesity (7857308739)  Glaucoma (16601217)  Comments:  2012 CDT 1:46 PM CDT - Brooke Barraza  Right eye  Resolved  *[facility] - Acute low back pain, L4 nerve root impingement: Onset on 2012 at 66 years.  Resolved.   Family History:    Suicide  Father ()  Kidney stone  Son (Elliot)  Diabetes mellitus type II  Mother ()  Asthma  Sister (Shira)  Heart attack  Mother ()  Son (Terrence)  CABG - Coronary artery bypass graft  Son (Terrence)  Coronary heart disease  Mother ()     Procedure history:    Esophagogastroduodenoscopy (785675651) on 2017 at 71 Years.  Comments:  2/10/2017 12:02 PM - Vinayak Lei MD  Gastric ulcer  Coronary angiogram (36456819) in the month of 2010 at 64 Years.  Comments:  2010 10:18 AM - Lew Huggins MD  no intervention with stents  OD cataract with IOL in the month of 2006 at 61 Years.  glaucoma surgery OD in the month of 3/2006 at 60 Years.  coronary angioplasty with stents in  at 55 Years.  Coronary angioplasty (70189765) in  at 52 Years.  DEE - Total abdominal hysterectomy and bilateral salpingo-oophorectomy (1998895672) in  at 30 Years.  Comments:  2010 1:59 PM - Hoda Fortune  benign fibroids  Cholecystectomy (17727093) in 1968 at 23 Years.  Appendectomy (900877406) in  1960 at 15 Years.  Tonsillectomy (426105067) in 1956 at 11 Years.  Dilatation and curettage (9813647906).  Comments:  4/21/2010 1:59 PM - Hoda Fortune  benign   Social History:        Alcohol Assessment            Never      Tobacco Assessment            Past                     Comments:                      09/25/2012 - Brooke Barraza                     Quit in 2000      Employment and Education Assessment            Retired, Work/School description: [employer].        Physical Examination   vital signs stable, as noted above   Vital Signs   6/20/2018 10:43 AM CDT Temperature Tympanic 97 DegF  LOW    Peripheral Pulse Rate 60 bpm    Systolic Blood Pressure 126 mmHg    Diastolic Blood Pressure 60 mmHg    Mean Arterial Pressure 82 mmHg      Measurements from flowsheet : Measurements   6/20/2018 10:43 AM CDT   Weight Measured - Standard                166.6 lb     General:  Alert and oriented, No acute distress.    Eye:  Pupils are equal, round and reactive to light, Extraocular movements are intact, Normal conjunctiva.    HENT:  Normocephalic, Oral mucosa is moist.    Neck:  Supple, No carotid bruit, No jugular venous distention, No lymphadenopathy.    Respiratory:  Lungs are clear to auscultation, Respirations are non-labored.    Cardiovascular:  Normal rate, Regular rhythm, No murmur, No edema.    Gastrointestinal:  Soft, Non-tender.    Musculoskeletal:  Normal strength, left with finger trigger finger.    Neurologic:  Alert, Oriented, Normal motor function, No focal deficits, Cranial Nerves II-XII are grossly intact, normal monofilament testing.    Cognition and Speech:  Oriented, Speech clear and coherent, Functional cognition intact.    Psychiatric:  Appropriate mood & affect.       Review / Management   Results review:  Lab results: 6/13/2018 10:07 AM CDT   Hgb A1c                   8.0  HI  .       Impression and Plan   Diagnosis     Obesity (EIS84-GX E66.9).     Diabetes Mellitus, Type 2 (HJX34-ER E11.9).      Background Diabetic Retinopathy (UHW37-JO E11.311).     Arteriosclerotic Heart Disease (ASHD) (WOP05-JD I25.10).     Hyperlipemia (QIW96-TV E78.2).         .) CAD   - Lexiscan (12/2016) - no reversible defects   - previously intolerant to higher dose ISMN, she does have known atherostenotic disease involving LCx   - ASA, atenolol 25mg daily, atorvastatin 40mg QHS,    - lower threshold for PCI given historic intolerance to long-acting nitrates    .) GERD/esophagitis   - EGD in 12/2016 showing gastric ulcer - previous symptoms now resolved   - pantoprazole 40mg daily   - denies any NSAIDs    .) type II DM, uncontrolled  hypoglycemic medications: intolerant to metformin due to GI symptoms  insulin therapy: Toujeo 40 units, Humalog 12-15 units TID + SSI   - flowsheet for ICR 1:4, sensitivity 15 (~20 units q meal)   - could consider future GLP1 agonist  last HbA1c: 8.0%   microvascular complications: retinopathy, mo microalbuminuria  macrovascular complications: CAD  ASA/GILDA/statin:  ASA 325mg daily, no microalbuminuira, atorvastatin 40mg    - previous myalgias on higher dose of statin    .) HTN, controlled  current antihypertensive regimen: atenolol/chlorthalidone 25/12.5mg, amlodipine 5mg,   regimen changes:  intolerance:  future titration/work-up plan: low threshold to change/add ACEI if development of microalbuminuria    .) Health maintenance   - declines any means of CRC screening   - DEXA '18: osteopenia   - prrior DEE   - q2 yr mammography    RTC in 6 months

## 2022-02-16 NOTE — NURSING NOTE
Comprehensive Intake Entered On:  9/18/2019 3:44 PM CDT    Performed On:  9/18/2019 3:40 PM CDT by Paula Concepcion CMA               Summary   Chief Complaint :   f/u chronic disease   Weight Measured :   165.4 lb(Converted to: 165 lb 6 oz, 75.02 kg)    Height Measured :   61.25 in(Converted to: 5 ft 1 in, 155.57 cm)    Body Mass Index :   30.99 kg/m2 (HI)    Body Surface Area :   1.8 m2   Systolic Blood Pressure :   130 mmHg   Diastolic Blood Pressure :   60 mmHg   Mean Arterial Pressure :   83 mmHg   Peripheral Pulse Rate :   60 bpm   BP Site :   Right arm   Temperature Tympanic :   97.8 DegF(Converted to: 36.6 DegC)  (LOW)    Race :      Languages :   English   Paula Concepcion CMA - 9/18/2019 3:40 PM CDT   Health Status   Allergies Verified? :   Yes   Medication History Verified? :   Yes   Medical History Verified? :   Yes   Pre-Visit Planning Status :   Completed   Tobacco Use? :   Former smoker   Paula Concepcion CMA - 9/18/2019 3:40 PM CDT   Social History   Social History   (As Of: 9/18/2019 3:44:27 PM CDT)   Alcohol:        Never   (Last Updated: 1/24/2019 5:51:28 PM CST by Kathryn Worrell)          Tobacco:        Past   (Last Updated: 4/21/2010 2:07:14 PM CDT by Hoda Fortune CMA)    Comments:  9/25/2012 1:41 PM - Brooke Barraza: Quit in 2000   (Last Updated: 9/25/2012 1:41:36 PM CDT by Brooke Barraza)          Employment/School:        Retired, Work/School description: [employer].   (Last Updated: 7/2/2010 10:15:57 AM CDT by Lew Huggins MD)

## 2022-02-16 NOTE — NURSING NOTE
CAGE Assessment Entered On:  1/24/2019 5:51 PM CST    Performed On:  1/22/2019 5:51 PM CST by Kathryn Worrell               Assessment   Have you ever felt you should cut down on your drinking :   No   Have people annoyed you by criticizing your drinking :   No   Have you ever felt bad or guilty about your drinking :   No   Have you ever taken a drink first thing in the morning to steady your nerves or get rid of a hangover (Eye-opener) :   No   CAGE Score :   0    Kathryn Worrell - 1/24/2019 5:51 PM CST

## 2022-02-16 NOTE — PROGRESS NOTES
Patient:   ALEJANDRA HERRERA            MRN: [id number]            FIN: [id number]               Age:   71 years     Sex:  Female     :  1945   Associated Diagnoses:   Obesity; Diabetes Mellitus, Type 2; Background Diabetic Retinopathy; Arteriosclerotic Heart Disease (ASHD); Hyperlipemia   Author:   Edgardo Luong MD      Visit Information      Date of Service: 2017 11:15 am  Performing Location: [facility]  Encounter#: [id number]      Primary Care Provider (PCP):  Edgardo Luong MD    NPI# [id number]      Referring Provider:  No referring provider recorded for selected visit.      Chief Complaint   2017 11:37 AM CDT    DM check              Additional Information:No additional information recorded during visit.   Chief complaint and symptoms as noted above and confirmed with patient      History of Present Illness   2014: Alejandra presents to clinic to establish care, previously followed by JEB.  She has a history of type 2 diabetes historically with uncontrolled hyperglycemia with hemoglobin A1c consistently above 9%, until approximately 1-1/2 years ago she was transitioned to Lantus basal and Lispro bolus insulin dosing with significant improvement in her glycemic control. Currently taking Lantus 40 units nightly, Humalog 15 units 3 times a day before meals.  She has a history of retinopathy, no known peripheral neuropathy or nephropathy.  She has remote history of atherosclerotic coronary artery disease, undergoing angioplasty in the mid 90s.  Denies any anginal equivalent symptoms.  She has never undergone screening for colorectal cancer. Labs reviewed with her.     2016: Alejandra presents to clinic for follow-up related to her type 2 diabetes.  She states that if she takes Lantus 40 units in the evening, she consistently has low blood sugar recordings in the morning.  Typically eating only 2 meals per day; lunch and dinner.  She has been bolusing 15 units of Humalog.  Also with complaints  of postprandial chest discomfort.  Never with any association with activity.  Currently taking omeprazole 20 mg once daily.    12/15/2016: Presents to clinic for follow-up related to her diabetes.  She has been working with the care coordinators.  Finding a better insulin schedule.  Currently taking Humalog 12-14 units prior to meals.  She did recently switch over to Toujeo.  Brings up concerns related to ongoing postprandial chest discomfort.  She says the pain will come on after most meals.  Says that similar pains will occur though with any form of exertional activity.  She did try taking omeprazole twice daily without any substantial change in symptoms.  She has not tried taking any nitroglycerin.  She has a established history of coronary artery disease.    5/2/2017:  Returns to clinic for general follow-up.  Feeling well.  Previous back pains much improved.  No longer with chest pains.  Did complete nuclear stress testing this past December which was normal.  EGD showed ulcerative esophagitis.              Review of Systems   Constitutional:  No fever, No chills.    Eye:  Negative except as documented in history of present illness.    Ear/Nose/Mouth/Throat:  Negative except as documented in history of present illness.    Respiratory:  No shortness of breath.    Cardiovascular:  Chest pain, No palpitations, No peripheral edema, No syncope.    Gastrointestinal:  Heartburn, No vomiting, No constipation, No abdominal pain.    Genitourinary:  No dysuria, No hematuria.    Hematology/Lymphatics:  Negative except as documented in history of present illness.    Endocrine:  No excessive thirst, No polyuria.    Immunologic:  No recurrent fevers.    Musculoskeletal:  Back pain, No neck pain, No joint pain, No muscle pain.    Neurologic:  Alert and oriented X4, No numbness, No tingling.       Health Status   Allergies:    Allergic Reactions (Selected)  Severity Not Documented  Actos (Gi upset,edema)  Avalide  (Angioedema)  Avandia (Breathing problem)  Avelox (Shaky)  Benadryl (Hives)  Glucophage (Gi sx)  Lisinopril (Angioedema)  Naprosyn (Hives)  Penicillin (Hives)  PredniSONE (Hives)  Sulfa drug (Hives)  Tylenol (Feet swelling)  Zithromax (Breathing trouble)   Medications:  (Selected)   Prescriptions  Prescribed  1/2 cc insulin syringes (uses 2 daily) and One touch test strips and lancets: 1/2 cc insulin syringes (uses 2 daily) and One touch test strips and lancets, See Instructions, Instructions: uses 2 daily, Supply, # 100 EA, 5 Refill(s), Type: Maintenance, Pharmacy: [id number], uses 2 daily  NovoLOG FlexPen 100 units/mL subcutaneous solution: ( 15 unit(s) ), subcutaneous, tidac, # 45 mL, 2 Refill(s), Type: Maintenance, Pharmacy: [id number], Due to insurance needing to change from Humalog to Novolog, 15 unit(s) subcutaneous tidac  Protonix 40 mg oral delayed release tablet: 1 tab(s) ( 40 mg ), PO, daily, # 95 tab(s), 0 Refill(s), Type: Maintenance, Pharmacy: [id number], 1 tab(s) po daily  Toujeo SoloStar 300 units/mL subcutaneous solution: ( 35 unit(s) ), subcutaneous, daily, Instructions: Can increase by 1 unit until AM BS less than 130 if needed. This will replace Lantus, # 36 mL, 3 Refill(s), Type: Maintenance, Pharmacy: [id number], keep on hold and pt will notify when nee...  amLODIPine 5 mg oral tablet: 1 tab(s), PO, Daily, # 90 tab(s), 3 Refill(s), Type: Maintenance, Pharmacy: [id number], 1 tab(s) po daily  aspirin 325 mg oral tablet: 1 tab(s) ( 325 mg ), PO, Daily, # 30 tab(s), 0 Refill(s), Type: Maintenance, OTC (Rx)  atenolol-chlorthalidone 50 mg-25 mg oral tablet: 0.5 tab, PO, Daily, # 135 tab(s), 3 Refill(s), Type: Maintenance, Pharmacy: [id number], 0.5 tab po daily  atorvastatin 40 mg oral tablet: 1 tab(s) ( 40 mg ), po, daily, # 90 tab(s), 3 Refill(s), Type: Maintenance, Pharmacy: [id number], 1 tab(s) po daily  nitroglycerin 0.4  mg sublingual tablet: 1 tab(s), SL, q5min, PRN: for chest pain, # 25 tab(s), 1 Refill(s), Type: Maintenance, Pharmacy: [id number], keep on hold and pt will notify when needed, 1 tab(s) sl q5 min,PRN:for chest pain  short insulin pen needles: short insulin pen needles, See Instructions, Instructions: insulin shots 4x/day, Supply, # 400 EA, 3 Refill(s), Type: Maintenance, Pharmacy: [id number], insulin shots 4x/day   Problem list:    All Problems  Acute Low Back Pain / ICD-9-.2 / Confirmed  Arteriosclerotic Heart Disease (ASHD) / ICD-9-.00 / Confirmed  Background Diabetic Retinopathy / ICD-9-.50 / Confirmed  Diabetes Mellitus, Type 2 / ICD-9-.00 / Confirmed  GERD (Gastroesophageal Reflux Disease) / ICD-9-.81 / Confirmed  Glaucoma / ICD-9-.9 / Confirmed  Hypertension / SNOMED CT 4832193136 / Confirmed  Hyperlipemia / SNOMED CT 785222203 / Confirmed  Obesity / ICD-9-.00 / Probable  Inactive: Tobacco user / ICD-9-.1  Resolved: *Hospitalized@[facility] - Acute low back pain, L4 nerve root impingement  Canceled: Obese / ICD-9-.00      Histories   Past Medical History:    Active  Hypertension (9781002822)  Hyperlipemia (285274907)  Glaucoma (365.9)  Comments:  2012 CDT 1:46 PM CDT - Brooke Barraza  Right eye  Resolved  *Hospitalized@[facility] - Acute low back pain, L4 nerve root impingement: Onset on 2012 at 66 years.  Resolved.   Family History:    Suicide  Father ()  Kidney stone  Son (Elliot)  Diabetes mellitus type II  Mother ()  Asthma  Sister (Shira)  Heart attack  Mother ()  Son (Terrence)  CABG - Coronary artery bypass graft  Son (Terrence)  Coronary heart disease  Mother ()     Procedure history:    Esophagogastroduodenoscopy (193082575) on 2017 at 71 Years.  Comments:  2/10/2017 12:02 PM - Quique STINSON, Vinayak  Gastric ulcer  Coronary angiogram ([id number]) in the month of 2010 at 64 Years.  Comments:  2010  10:18 AM - Lew Huggins MD  no intervention with stents  OD cataract with IOL in the month of 12/2006 at 61 Years.  glaucoma surgery OD in the month of 3/2006 at 60 Years.  coronary angioplasty with stents in 2000 at 55 Years.  Coronary angioplasty (86821976) in 1997 at 52 Years.  DEE - Total abdominal hysterectomy and bilateral salpingo-oophorectomy (3101854909) in 1975 at 30 Years.  Comments:  4/21/2010 1:59 PM - Hoda Fortune  benign fibroids  Cholecystectomy (40074918) in 1968 at 23 Years.  Appendectomy (500050278) in 1960 at 15 Years.  Tonsillectomy (913242962) in 1956 at 11 Years.  Dilatation and curettage (3793061631).  Comments:  4/21/2010 1:59 PM - Hoda Fortune  benign   Social History:        Alcohol Assessment: Denies Alcohol Use      Tobacco Assessment            Past                     Comments:                      09/25/2012 - Brooke Barraza                     Quit in 2000      Employment and Education Assessment            Retired, Work/School description: [employer].        Physical Examination   vital signs stable, as noted above   Vital Signs   5/2/2017 11:37 AM CDT Temperature Tympanic 97.6 DegF  LOW    Peripheral Pulse Rate 60 bpm    Systolic Blood Pressure 136 mmHg    Diastolic Blood Pressure 60 mmHg    Mean Arterial Pressure 85 mmHg    BP Site Right arm      Measurements from flowsheet : Measurements   5/2/2017 11:37 AM CDT    Weight Measured - Standard                165.2 lb     General:  Alert and oriented, No acute distress.    Eye:  Pupils are equal, round and reactive to light, Extraocular movements are intact, Normal conjunctiva.    HENT:  Normocephalic, Oral mucosa is moist.    Neck:  Supple, No carotid bruit, No jugular venous distention, No lymphadenopathy.    Respiratory:  Lungs are clear to auscultation, Respirations are non-labored.    Cardiovascular:  Normal rate, Regular rhythm, No murmur, No edema.    Gastrointestinal:  Soft, Non-tender, no epigastric or RUQ  tenderness.    Musculoskeletal:  Normal strength.    Neurologic:  Alert, Oriented, Normal motor function, No focal deficits, Cranial Nerves II-XII are grossly intact.    Cognition and Speech:  Oriented, Speech clear and coherent, Functional cognition intact.    Psychiatric:  Appropriate mood & affect.       Review / Management   Results review:  Lab results: 4/26/2017 8:42 AM CDT    Hgb A1c                   8.7  HI  .       Impression and Plan   Diagnosis     Obesity (TOM95-LK E66.9).     Diabetes Mellitus, Type 2 (LCJ22-WT E11.9).     Background Diabetic Retinopathy (EVK93-KM E11.329).     Arteriosclerotic Heart Disease (ASHD) (MFN40-IT I25.10).     Hyperlipemia (DEV72-TI E78.2).         .) CAD   - Lexiscan (12/2016) - no reversible defects   - referral to cardiology (last saw Dr. Alberto in '11)   - previously intolerant to higher dose ISMN, she does have known atherostenotic disease involving LCx   - ASA, atenolol 25mg daily, atorvastatin 40mg QHS,    - lower threshold for PCI given historic intolerance to long-acting nitrates    .) GERD/esophagitis   - EGD in 12/2016 showing gastric ulcer - previous symptoms now resolved   - pantoprazole 40mg daily   - denies any NSAIDs    .) type II DM, uncontrolled  hypoglycemic medications: intolerant to metformin due to GI symptoms  insulin therapy: Toujeo 35 units, Humalog 12-15 units TID + SSI   - provided spreadsheet for ICR 1:5 and sensitivity of 15mg/dL  last HbA1c: 8.7%   microvascular complications: retinopathy, mo microalbuminuria  macrovascular complications: CAD  ASA/GILDA/statin:  ASA 325mg daily, no microalbuminuira, atorvastatin 40mg    - previous myalgias on higher dose of statin    .) HTN, historically controlled  current antihypertensive regimen: atenolol/chlorthalidone 25/12.5mg, amlodipine 5mg,   regimen changes:  intolerance:  future titration/work-up plan: low threshold to change/add ACEI if development of microalbuminuria    .) Health maintenance   -  schedule Cologuard (has kit at home); she would be willing to do diagnostic colonoscopy if screening test is positive   - normal DEXA 10/2013, due in 2018   - prrior DEE   - annual mammography   - enrolled in CCM program    RTC in 4 months

## 2022-02-16 NOTE — LETTER
(Inserted Image. Unable to display)   July 22, 2020      ALEJANDRA HERRERA   [place], [place] [id number]                  Result Name Current Result Previous Result Reference Range   Lab Report   7/16/2020     U Creatinine (mg/dL)  79 7/16/2020  20 - 275   U Microalbumin (mg/dL)  2.4 7/16/2020  See Note: -    Ur Microalbumin/Creatinine Ratio ((H)) 30 7/16/2020   - <30   Cholesterol (mg/dL)  161 7/16/2020   - <200   HDL (mg/dL)  64 7/16/2020  > OR = 50 -    Triglyceride (mg/dL)  83 7/16/2020   - <150   LDL  80 7/16/2020     Cholesterol/HDL Ratio  2.5 7/16/2020   - <5.0   Non-HDL Cholesterol  97 7/16/2020   - <130   Glucose Level (mg/dL)  96 7/16/2020  65 - 99   BUN (mg/dL)  24 7/16/2020  7 - 25   Creatinine Level (mg/dL) ((H)) 1.47 7/16/2020  0.60 - 0.93   eGFR (mL/min/1.73m2) ((L)) 34 7/16/2020  > OR = 60 -    eGFR  (mL/min/1.73m2) ((L)) 40 7/16/2020  > OR = 60 -    BUN/Creat Ratio  16 7/16/2020  6 - 22   Sodium Level (mmol/L)  141 7/16/2020  135 - 146   Potassium Level (mmol/L)  4.9 7/16/2020  3.5 - 5.3   Chloride Level (mmol/L)  107 7/16/2020  98 - 110   CO2 Level (mmol/L)  28 7/16/2020  20 - 32   Calcium Level (mg/dL)  10.1 7/16/2020  8.6 - 10.4   Hgb A1c ((H)) 7.9 7/16/2020 ((H)) 9.6 2/4/2020  - <5.7         Sincerely,        Edgardo Luong MD

## 2022-02-16 NOTE — LETTER
(Inserted Image. Unable to display)   February 23, 2021      ALEJANDRA HERRERA   [place], [place] [id number]        Dear ALEJANDRA,     Thank you for selecting [facility] ([facility]viously [facility]) for your healthcare needs. Below you will find the results of your recent test(s) done at our clinic.      Your COVID antibody levels are normal.  This, along with your negative viral testing last month, would support that your January viral illness was most likely NOT COVID related.  This would mean you are still susceptible to future COVID infection, and I would very much support you getting a COVID vaccination when available to you.        Result Name Current Result   Coronavirus SARS-CoV-2 (COVID-19) Ab IgG  NEGATIVE 2/19/2021       Please contact me or my assistant at [phone] if you have any questions or concerns.     Sincerely,        Edgardo Luong MD    What do your labs mean?  Below is a glossary of commonly ordered labs:  LDL - Bad Cholesterol  HDL - Good Cholesterol  AST/ALT - Liver Function  Cr/Creatinine - Kidney Function  Microalbumin - Kidney Function  BUN - Kidney Function  PSA - Prostate   TSH - Thyroid Hormone  HgbA1c - Diabetes Test  Hgb (Hemoglobin) - Red Blood Cells

## 2022-02-16 NOTE — PROGRESS NOTES
Patient:   ALEJANDRA HERRERA            MRN: [id number]            FIN: [id number]               Age:   74 years     Sex:  Female     :  1945   Associated Diagnoses:   Obesity; Diabetes Mellitus, Type 2; Background Diabetic Retinopathy; Arteriosclerotic Heart Disease (ASHD); Hyperlipemia   Author:   Edgardo Luong MD      Visit Information      Date of Service: 2020 12:47 pm  Performing Location: [facility]  Encounter#: [id number]      Primary Care Provider (PCP):  Edgardo Luong MD    NPI# [id number]      Referring Provider:  Edgardo Luong MD, NPI# [id number]      Chief Complaint   2020 12:57 PM CST   DM check, review meds, needs refills. also c/o chest congestion/tightness, some cough, SOB at times xSaturday            Additional Information:No additional information recorded during visit.   Chief complaint and symptoms as noted above and confirmed with patient      History of Present Illness   2014: Alejandra presents to clinic to establish care, previously followed by JEB.  She has a history of type 2 diabetes historically with uncontrolled hyperglycemia with hemoglobin A1c consistently above 9%, until approximately 1-1/2 years ago she was transitioned to Lantus basal and Lispro bolus insulin dosing with significant improvement in her glycemic control. Currently taking Lantus 40 units nightly, Humalog 15 units 3 times a day before meals.  She has a history of retinopathy, no known peripheral neuropathy or nephropathy.  She has remote history of atherosclerotic coronary artery disease, undergoing angioplasty in the mid 90s.  Denies any anginal equivalent symptoms.  She has never undergone screening for colorectal cancer. Labs reviewed with her.       2019: Alejandra presents for regular follow-up.  Overall doing well.  She has been following her provided flowsheet for prandial insulin dosing.  Currently following insulin to carbohydrate ratio of 1-4 and sensitivity of 15.  Feeling more  comfortable with carb counting.  Denies any recurrent hypoglycemia.  Feeling well otherwise.  Denies any chest pain or shortness of breath.  Working with a chiropractor regularly related to chronic hip and low back pain    9/18/2019: Yoselyn presents to clinic for diabetic follow-up.  Overall doing well.  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  She is doing fewer amounts of chores which she feels like is helping with her back and her body aches.  Concerns related to her cumulative cost and insulin.  Otherwise tolerating medications without issues.  Feels like she has been doing well overall    2/20/20: Yoselyn presents for regular diabetic follow-up.  She was surprised to see recent increase in her A1c.  Does not feel like she is been doing differently in terms of her insulin regimen.  We had pursued freestyle emily on her this past year though apparently did run into obstacles in terms of working with the device company.  Describes having cold-like symptoms now for the past 5 days.  Describes a chest tightness with some dyspnea.  Some cough no real production.  No described fever chills.  She does say her  has had similar symptoms.         Review of Systems   Constitutional:  No fever, No chills.    Eye:  Negative except as documented in history of present illness.    Ear/Nose/Mouth/Throat:  Negative except as documented in history of present illness.    Respiratory:  Shortness of breath, Wheezing.    Cardiovascular:  No chest pain, No palpitations, No peripheral edema, No syncope.    Gastrointestinal:  No vomiting, No constipation, No heartburn, No abdominal pain.    Genitourinary:  No dysuria, No hematuria.    Hematology/Lymphatics:  Negative except as documented in history of present illness.    Endocrine:  No excessive thirst, No polyuria.    Immunologic:  No recurrent fevers.    Musculoskeletal:  Joint pain, Decreased range of motion, No back pain, No  neck pain, No muscle pain.    Neurologic:  Alert and oriented X4, No numbness, No tingling.       Health Status   Allergies:    Allergic Reactions (Selected)  Severity Not Documented  Actos (Gi upset,edema)  Avalide (Angioedema)  Avandia (Breathing problem)  Avelox (Shaky)  Benadryl (Hives)  Glucophage (Gi sx)  Lisinopril (Angioedema)  Naprosyn (Hives)  Penicillin (Hives)  PredniSONE (Hives)  Sulfa drug (Hives and nausea and vomitting)  Tylenol (Feet swelling)  Zithromax (Breathing trouble)  Nonallergic Reactions (Selected)  Unknown  Hydrochlorothiazide-irbesartan (Angioedema)  Ibuprofen (No reactions were documented)  Peanuts (Itching)   Medications:  (Selected)   Prescriptions  Prescribed  1/2 cc insulin syringes (uses 2 daily) and One touch test strips and lancets: 1/2 cc insulin syringes (uses 2 daily) and One touch test strips and lancets, See Instructions, Instructions: uses 2 daily, Supply, # 100 EA, 5 Refill(s), Type: Maintenance, Pharmacy: [id number], uses 2 daily  NovoLOG FlexPen 100 units/mL injectable solution: See Instructions, Instructions: INJECT 15 UNITS SUBCUTANEOUSLY THREE TIMES DAILY BEFORE MEALS, # 45 mL, 3 Refill(s), Type: Soft Stop, Pharmacy: [facility], keep on file-pt will notify when needed, INJECT 15 UNITS SUBCUTANEOUSLY THREE TIMES...  ONE TOUCH ULTRA TEST STRIPS: ONE TOUCH ULTRA TEST STRIPS, See Instructions, Instructions: TESTING qid - E11.9, Supply, # 400 EA, 3 Refill(s), Type: Maintenance, Pharmacy: [facility], keep on file and pt will notify when needed, TESTING qid - E11.9  One Touch Ultra Blood glucose meter: One Touch Ultra Blood glucose meter, See Instructions, Instructions: use as directed - E11.9, Supply, # 1 EA, 0 Refill(s), Type: Maintenance, Pharmacy: [facility], use as directed - E11.9  Toujeo SoloStar 300 units/mL subcutaneous solution: See Instructions, Instructions: INJECT 35 UNITS SUBCUTANEOUSLY ONCE DAILY. MAY INCREASE 1 UNIT  UNTIL SUGAR IS BELOW 130, # 46 mL, 3 Refill(s), Type: Soft Stop, Pharmacy: [place] [id number], INJECT 35 UNITS SUBCUTANEOUSLY ONCE DAILY. MAY INCREASE 1...  Ventolin HFA 90 mcg/inh inhalation aerosol: See Instructions, Instructions: 1-2 puff(s) Inhale q2-4hr, PRN: as needed for wheezing, # 1 EA, 1 Refill(s), Type: Maintenance, Pharmacy: [place] [id number], 1-2 puff(s) Inhale q2-4hr,PRN:as needed for wheezing  amLODIPine 5 mg oral tablet: = 1 tab(s), Oral, daily, # 90 tab(s), 0 Refill(s), Type: Maintenance, Pharmacy: [place] [id number], keep on file-pt will notify when needed, 1 tab(s) Oral daily  aspirin 325 mg oral tablet: 1 tab(s) ( 325 mg ), PO, Daily, # 30 tab(s), 0 Refill(s), Type: Maintenance, OTC (Rx)  atenolol-chlorthalidone 50 mg-25 mg oral tablet: 0.5 tab(s), Oral, daily, # 45 tab(s), 1 Refill(s), Type: Maintenance, Pharmacy: [place] [id number], keep on file-pt will notify when needed, 0.5 tab(s) Oral daily  atorvastatin 40 mg oral tablet: = 1 tab(s), Oral, daily, # 90 tab(s), 1 Refill(s), Type: Maintenance, Pharmacy: [place] [id number], keep on file-pt will notify when needed, 1 tab(s) Oral daily  nitroglycerin 0.4 mg sublingual tablet: = 1 tab(s), SL, q5min, PRN: for chest pain, # 25 tab(s), 1 Refill(s), Type: Maintenance, Pharmacy: [id number], keep on hold and pt will notify when needed, 1 tab(s) SL q5 min,PRN:for chest pain  pantoprazole 40 mg oral delayed release tablet: = 1 tab(s) ( 40 mg ), po, daily, # 90 tab(s), 1 Refill(s), Type: Maintenance, Pharmacy: [place] [id number], keep on file and pt will notify when needed, 1 tab(s) Oral daily  short insulin pen needles: short insulin pen needles, See Instructions, Instructions: insulin shots 4x/day, Supply, # 400 EA, 3 Refill(s), Type: Maintenance, Pharmacy: [id number], keep on hold and pt will notify when needed, insulin shots 4x/day  traMADol 50 mg oral tablet: See Instructions, Instructions: TAKE 1 TABLET BY  MOUTH EVERY 4 HOURS AS NEEDED FOR PAIN, # 30 tab(s), Type: Soft Stop, Pharmacy: [id number], TAKE 1 TABLET BY MOUTH EVERY 4 HOURS AS NEEDED FOR PAIN,    Medications          *denotes recorded medication          short insulin pen needles: See Instructions, insulin shots 4x/day, 400 EA, 3 Refill(s).          ONE TOUCH ULTRA TEST STRIPS: See Instructions, TESTING qid - E11.9, 400 EA, 3 Refill(s).          1/2 cc insulin syringes (uses 2 daily) and One touch test strips and lancets: See Instructions, uses 2 daily, 100 EA.          One Touch Ultra Blood glucose meter: See Instructions, use as directed - E11.9, 1 EA, 0 Refill(s).          Ventolin HFA 90 mcg/inh inhalation aerosol: See Instructions, 1-2 puff(s) Inhale q2-4hr, PRN: as needed for wheezing, 1 EA, 1 Refill(s).          amLODIPine 5 mg oral tablet: 1 tab(s), Oral, daily, 90 tab(s), 0 Refill(s).          aspirin 325 mg oral tablet: 325 mg, 1 tab(s), PO, Daily, 30 tab(s).          atenolol-chlorthalidone 50 mg-25 mg oral tablet: 0.5 tab(s), Oral, daily, 45 tab(s), 1 Refill(s).          atorvastatin 40 mg oral tablet: 1 tab(s), Oral, daily, 90 tab(s), 1 Refill(s).          NovoLOG FlexPen 100 units/mL injectable solution: See Instructions, INJECT 15 UNITS SUBCUTANEOUSLY THREE TIMES DAILY BEFORE MEALS, 45 mL, 3 Refill(s).          Toujeo SoloStar 300 units/mL subcutaneous solution: See Instructions, INJECT 35 UNITS SUBCUTANEOUSLY ONCE DAILY. MAY INCREASE 1 UNIT UNTIL SUGAR IS BELOW 130, 46 mL, 3 Refill(s).          nitroglycerin 0.4 mg sublingual tablet: 1 tab(s), SL, q5min, PRN: for chest pain, 25 tab(s), 1 Refill(s).          pantoprazole 40 mg oral delayed release tablet: 40 mg, 1 tab(s), po, daily, 90 tab(s), 1 Refill(s).          traMADol 50 mg oral tablet: See Instructions, TAKE 1 TABLET BY MOUTH EVERY 4 HOURS AS NEEDED FOR PAIN, 30 tab(s).       Problem list:    All Problems  Acute low back pain / SNOMED CT 906357048 / Confirmed  Arteriosclerotic  heart disease (ASHD) / SNOMED CT 46290225 / Confirmed  GERD (gastroesophageal reflux disease) / SNOMED CT 419670246 / Confirmed  Glaucoma / SNOMED CT 66193122 / Confirmed  Hypertension / SNOMED CT 2859786281 / Confirmed  Insulin long-term use / SNOMED CT 9282242697 / Confirmed  Hyperlipemia / SNOMED CT 391105954 / Confirmed  Background diabetic retinopathy / SNOMED CT 2317515949 / Confirmed  Obesity / SNOMED CT 6920511638 / Probable  Diabetes mellitus, type 2 / SNOMED CT 659532903 / Confirmed  Inactive: Tobacco user / ICD-9-.1  Resolved: *Hospitalized@[facility] - Acute low back pain, L4 nerve root impingement  Canceled: Long term current use of oral hypoglycemic drug / SNOMED CT 574230111  Canceled: Obese / ICD-9-.00      Histories   Past Medical History:    Active  Diabetes mellitus, type 2 (595439789): Onset in  at 51 years.  Comments:  2010 CST 3:00 PM CST Adwoa Juan Josejose Paula CARDENAS  started Insulin 2002  Background diabetic retinopathy (2063948828)  Arteriosclerotic heart disease (ASHD) (70582345)  Comments:  2010 CST 3:02 PM CST Adwoa Concepcion Paula CARDENAS  Coronary Angioplasty and Stents x 2    2010 CDT 2:14 PM CDT - Lew Huggins MD  &   GERD (gastroesophageal reflux disease) (773954904)  Hypertension (3795868323)  Hyperlipemia (195722137)  Acute low back pain (407384436)  Comments:  2012 CDT 10:46 AM CDT - Lew Huggins MD  L3-L4 disc with L4 nerve root impingement  Obesity (3202434976)  Glaucoma (82242990)  Comments:  2012 CDT 1:46 PM CDT - Brooke Barraza  Right eye  Resolved  *Hospitalized@[facility] - Acute low back pain, L4 nerve root impingement: Onset on 2012 at 66 years.  Resolved.   Family History:    Suicide  Father ()  Kidney stone  Son (Elliot)  Diabetes mellitus type II  Mother ()  Asthma  Sister (Shira)  Heart attack  Mother ()  Son (Terrence)  CABG - Coronary artery bypass graft  Son (Terrence)  Coronary heart disease  Mother ()      Procedure history:    Esophagogastroduodenoscopy (474202971) on 2/8/2017 at 71 Years.  Comments:  2/10/2017 12:02 PM CST - Quique STINSON, Vinayak  Gastric ulcer  Coronary angiogram (96906159) in the month of 4/2010 at 64 Years.  Comments:  7/2/2010 10:18 AM CDT - Lew Huggins MD  no intervention with stents  OD cataract with IOL in the month of 12/2006 at 61 Years.  glaucoma surgery OD in the month of 3/2006 at 60 Years.  coronary angioplasty with stents in 2000 at 55 Years.  Coronary angioplasty (26975347) in 1997 at 52 Years.  DEE - Total abdominal hysterectomy and bilateral salpingo-oophorectomy (5580784664) in 1975 at 30 Years.  Comments:  4/21/2010 1:59 PM CDT - Hoda Fortune  benign fibroids  Cholecystectomy (74161773) in 1968 at 23 Years.  Appendectomy (654458369) in 1960 at 15 Years.  Tonsillectomy (982164174) in 1956 at 11 Years.  Dilatation and curettage (5397386648).  Comments:  4/21/2010 1:59 PM CDT - Hoda Fortune  benign   Social History:        Alcohol Assessment            Never      Tobacco Assessment            Past                     Comments:                      09/25/2012 - Brooke Barraza                     Quit in 2000      Employment and Education Assessment            Retired, Work/School description: [employer].        Physical Examination   vital signs stable, as noted above   Vital Signs   2/20/2020 12:57 PM CST Temperature Tympanic 96.9 DegF  LOW    Peripheral Pulse Rate 48 bpm  LOW    Pulse Site Radial artery    HR Method Manual    Systolic Blood Pressure 132 mmHg  HI    Diastolic Blood Pressure 52 mmHg  LOW    Mean Arterial Pressure 79 mmHg    BP Site Right arm    BP Method Manual    Oxygen Saturation 96 %      Measurements from flowsheet : Measurements   2/20/2020 12:57 PM CST Height Measured - Standard 61.25 in    Weight Measured - Standard 169 lb    BSA 1.82 m2    Body Mass Index 31.67 kg/m2  HI      General:  Alert and oriented, No acute distress.    Eye:  Pupils are equal,  round and reactive to light, Extraocular movements are intact, Normal conjunctiva.    HENT:  Normocephalic, Oral mucosa is moist.    Neck:  Supple, No carotid bruit, No jugular venous distention, No lymphadenopathy.    Respiratory:  Lungs are clear to auscultation, Respirations are non-labored.    Cardiovascular:  Normal rate, Regular rhythm, No murmur, No edema.    Gastrointestinal:  Soft, Non-tender.    Musculoskeletal:  Normal strength.    Neurologic:  Alert, Oriented, Normal motor function, No focal deficits, Cranial Nerves II-XII are grossly intact.    Cognition and Speech:  Oriented, Speech clear and coherent, Functional cognition intact.    Psychiatric:  Appropriate mood & affect.       Review / Management   Results review:  Lab results: 2/4/2020 9:00 AM CST     Hgb A1c                   9.6  HI.       Impression and Plan   Diagnosis     Obesity (KSP11-MC E66.9).     Diabetes Mellitus, Type 2 (KMC16-GQ E11.9).     Background Diabetic Retinopathy (DYH77-RO E11.311).     Arteriosclerotic Heart Disease (ASHD) (HMO43-EB I25.10).     Hyperlipemia (APU01-XW E78.2).         .) GERD/esophagitis   - EGD in 12/2016 showing gastric ulcer - previous symptoms now resolved   - pantoprazole 40mg daily   - denies any NSAIDs    .) type II DM, uncontrolled   hypoglycemic medications: intolerant to metformin due to GI symptoms  insulin therapy: Toujeo 35 units, Humalog 12-15 units TID + SSI   - ICR 1:3, sensitivity 10 (~20 units q meal)   - could consider future GLP1 agonist   - needs to see Brynn Patricia - consider CGM/Freestyle Adry  last HbA1c: 9.6%   microvascular complications: proliferative retinopathy - sees ophtho regularly, mo microalbuminuria  macrovascular complications: CAD  ASA/GILDA/statin:  ASA 325mg daily, no microalbuminuira, atorvastatin 40mg    - previous myalgias on higher dose of statin    Patient has met criteria for CGM coverage;   - patient has type 2 diabetes mellitus; and  - patient has been using a blood  glucose monitor and performing four or more blood glucose test per day; and  - patient is insulin treated with multiple daily injections of three or more times per day; and  - patient is requiring frequent insulin adjustments on the basis of blood glucose readings  - within six months prior to ordering the CGM, the patient has had an in-person visit with the practitioner; and determined that criteria (1-4) above are met; and   - every six months following the initial prescription of the CGM, the treating practitioner has an in-person visit with the patient to assess adherence to their CGM regimen and diabetes treatment plan     .) HTN, controlled  current antihypertensive regimen: atenolol/chlorthalidone 25/12.5mg, amlodipine 5mg,   regimen changes:  intolerance:  future titration/work-up plan: low threshold to change/add ACEI if development of microalbuminuria    .) CAD   - Lexiscan (12/2016) - no reversible defects   - previously intolerant to higher dose ISMN, she does have known atherostenotic disease involving LCx   - ASA, atenolol 25mg daily, atorvastatin 40mg QHS,    - lower threshold for PCI given historic intolerance to long-acting nitrates    .) Health maintenance   - declines any means of CRC screening   - DEXA '18: osteopenia   - prior DEE   - q2 yr mammography   - declines adult vaccinations except for pneumococcal series    RTC in 4 months

## 2022-02-16 NOTE — PROGRESS NOTES
Patient:   ALEJANDRA HERRERA            MRN: [id number]            FIN: [id number]               Age:   73 years     Sex:  Female     :  1945   Associated Diagnoses:   Diabetes mellitus, type 2; Insulin long-term use; Hypertension; Hyperlipemia   Author:   Carmen Patricia      Visit Information   Visit type:  Diabetes Self Management Education .    Referral source:  Ag STINSON, Edgardo.       Chief Complaint   DM Type II on insulin, Hyperlipidemia, Hypertension, Obesity       History of Present Illness   Discussed diet, diabetes, and lifestyle management with pt   Nutrition:  am 1/2 bagel or english muffin both white flour with cream cheese, noon meal protein, vegetable, fruit, evening may be sandwich, infrequent snacks   Physical Activity:  Farm chores, c/o back pain - seeing a chiropractor   Stress:  mod  Routine diabetes care: Up to date on yearly eye exam, wears dentures and brushes daily, foot exams with MD, does not want influenza vaccine   Insulin: Lantus 40units, Humalog 15u TID ac   BG Testinmg/dL in office ~ 3 hours after eating steak, tomato and sugar lemonaid   did not bring meter or log - reports testing TID ac       Health Status   Allergies:    Allergic Reactions (Selected)  Severity Not Documented  Actos (Gi upset,edema)  Avalide (Angioedema)  Avandia (Breathing problem)  Avelox (Shaky)  Benadryl (Hives)  Glucophage (Gi sx)  Lisinopril (Angioedema)  Naprosyn (Hives)  Penicillin (Hives)  PredniSONE (Hives)  Sulfa drug (Hives)  Tylenol (Feet swelling)  Zithromax (Breathing trouble)   Medications:  (Selected)   Prescriptions  Prescribed  1/2 cc insulin syringes (uses 2 daily) and One touch test strips and lancets: 1/2 cc insulin syringes (uses 2 daily) and One touch test strips and lancets, See Instructions, Instructions: uses 2 daily, Supply, # 100 EA, 5 Refill(s), Type: Maintenance, Pharmacy: [id number], uses 2 daily  NovoLOG FlexPen 100 units/mL injectable solution: See Instructions,  Instructions: INJECT 15 UNITS SUBCUTANEOUSLY THREE TIMES DAILY BEFORE MEALS, # 45 mL, 1 Refill(s), Type: Soft Stop, Pharmacy: [facility] #[id number], keep on file-can replace 1mo with 3mo refill if pt chooses, INJECT 15 UNITS SUBCUTAN...  ONE TOUCH ULTRA TEST STRIPS: ONE TOUCH ULTRA TEST STRIPS, See Instructions, Instructions: TESTING tid - E11.9, Supply, # 300 EA, 3 Refill(s), Type: Maintenance, Pharmacy: [facility] #[id number], keep on file and pt will notify when needed, TESTING tid - E11.9  Toujeo SoloStar 300 units/mL subcutaneous solution: See Instructions, Instructions: INJECT 35 UNITS SUBCUTANEOUSLY ONCE DAILY. MAY INCREASE 1 UNIT UNTIL SUGAR IS BELOW 130, # 13.5 mL, 1 Refill(s), Type: Soft Stop, Pharmacy: [facility] #[id number], keep on file-can replace 1mo with 3mo refill if pt choos...  amLODIPine 5 mg oral tablet: 1 tab(s), PO, Daily, # 90 tab(s), 1 Refill(s), Type: Maintenance, Pharmacy: [facility] #[id number], keep on file and pt will notify when needed, 1 tab(s) po daily  aspirin 325 mg oral tablet: 1 tab(s) ( 325 mg ), PO, Daily, # 30 tab(s), 0 Refill(s), Type: Maintenance, OTC (Rx)  atenolol-chlorthalidone 50 mg-25 mg oral tablet: 0.5 tab, PO, Daily, # 45 tab(s), 1 Refill(s), Type: Maintenance, Pharmacy: [facility] #[id number], keep on file and pt will notify when needed, 0.5 tab po daily  atorvastatin 40 mg oral tablet: 1 tab(s) ( 40 mg ), po, daily, # 90 tab(s), 1 Refill(s), Type: Maintenance, Pharmacy: [facility] #[id number], keep on file and pt will notify when needed, 1 tab(s) po daily  nitroglycerin 0.4 mg sublingual tablet: 1 tab(s), SL, q5min, PRN: for chest pain, # 25 tab(s), 1 Refill(s), Type: Maintenance, Pharmacy: [facility] #[id number], keep on hold and pt will notify when needed, 1 tab(s) sl q5 min,PRN:for chest pain  pantoprazole 40 mg oral delayed release tablet: 1 tab(s) ( 40 mg ), po, daily, # 90 tab(s), 1 Refill(s), Type: Maintenance, Pharmacy: [facility] #[id number], keep on file and pt  will notify when needed, 1 tab(s) po daily  short insulin pen needles: short insulin pen needles, See Instructions, Instructions: insulin shots 4x/day, Supply, # 400 EA, 3 Refill(s), Type: Maintenance, Pharmacy: [facility] #[id number], keep on hold and pt will notify when needed, insulin shots 4x/day  traMADol 50 mg oral tablet: 1 tab(s) ( 50 mg ), PO, q4hr, PRN: for pain, # 30 tab(s), 0 Refill(s), Type: Maintenance, Pharmacy: [facility] #[id number], 1 tab(s) po q4 hrs,x7 day(s),PRN:for pain   Problem list:    All Problems  Acute low back pain / SNOMED CT 848073786 / Confirmed  Arteriosclerotic heart disease (ASHD) / SNOMED CT 87480619 / Confirmed  GERD (gastroesophageal reflux disease) / SNOMED CT 946518405 / Confirmed  Glaucoma / SNOMED CT 28106849 / Confirmed  Hypertension / SNOMED CT 7863407044 / Confirmed  Insulin long-term use / SNOMED CT 7676928478 / Confirmed  Hyperlipemia / SNOMED CT 687936879 / Confirmed  Background diabetic retinopathy / SNOMED CT 5574505013 / Confirmed  Obesity / SNOMED CT 9664427986 / Probable  Diabetes mellitus, type 2 / SNOMED CT 219079237 / Confirmed  Inactive: Tobacco user / ICD-9-.1  Resolved: *Hospitalized@[facility] - Acute low back pain, L4 nerve root impingement  Canceled: Long term current use of oral hypoglycemic drug / SNOMED CT 966264808  Canceled: Obese / ICD-9-.00      Histories   Past Medical History:    Active  Diabetes mellitus, type 2 (487682538): Onset in 1996 at 51 years.  Comments:  1/29/2010 CST 3:00 PM CST - Jamey CARDENASPaula  started Insulin 2/2002  Background diabetic retinopathy (6447141273)  Arteriosclerotic heart disease (ASHD) (92011038)  Comments:  1/29/2010 CST 3:02 PM CST - Jamey Paula CARDENAS  Coronary Angioplasty and Stents x 2    11/1/2010 CDT 2:14 PM CDT - Joseluis STINSON, Lew  1997& 2000  GERD (gastroesophageal reflux disease) (657432192)  Hypertension (2015681715)  Hyperlipemia (974284049)  Acute low back pain (255669724)  Comments:  6/27/2012 CDT  10:46 AM CDT - Lew Huggins MD  L3-L4 disc with L4 nerve root impingement  Obesity (1444997792)  Glaucoma (01928541)  Comments:  2012 CDT 1:46 PM CDT - Christi Jji  Right eye  Resolved  *Hospitalized@[facility] - Acute low back pain, L4 nerve root impingement: Onset on 2012 at 66 years.  Resolved.   Family History:    Suicide  Father ()  Kidney stone  Son (Elliot)  Diabetes mellitus type II  Mother ()  Asthma  Sister (Shira)  Heart attack  Mother ()  Son (Terrence)  CABG - Coronary artery bypass graft  Son (Terrence)  Coronary heart disease  Mother ()     Procedure history:    Esophagogastroduodenoscopy (SNOMED CT 530633967) on 2017 at 71 Years.  Comments:  2/10/2017 12:02 PM - Elliot Lei MDory  Gastric ulcer  Coronary angiogram (SNOMED CT 63074881) in the month of 2010 at 64 Years.  Comments:  2010 10:18 AM - Lew Huggins MD  no intervention with stents  OD cataract with IOL in the month of 2006 at 61 Years.  glaucoma surgery OD in the month of 3/2006 at 60 Years.  coronary angioplasty with stents in  at 55 Years.  Coronary angioplasty (SNOMED CT 36266702) in  at 52 Years.  DEE - Total abdominal hysterectomy and bilateral salpingo-oophorectomy (SNOMED CT 4133544126) in  at 30 Years.  Comments:  2010 1:59 PM - Hoda Fortune  benign fibroids  Cholecystectomy (SNOMED CT 98032116) in  at 23 Years.  Appendectomy (SNOMED CT 749146675) in  at 15 Years.  Tonsillectomy (SNOMED CT 068999650) in  at 11 Years.  Dilatation and curettage (SNOMED CT 0541798331).  Comments:  2010 1:59 PM - Hoda Fortune  benign   Social History:        Alcohol Assessment            Never      Tobacco Assessment            Past                     Comments:                      2012 - Brooke Barraza                     Quit in       Employment and Education Assessment            Retired, Work/School description: [employer].        Physical  Examination   Measurements from flowsheet : Measurements   7/18/2018 2:56 PM CDT Height Measured - Standard 62.25 in    Weight Measured - Standard 167.2 lb    BSA 1.82 m2    Body Mass Index 30.33 kg/m2  HI         Health Maintenance      Recommendations     Pending (in the next year)        OverDue           DM - Communication with Managing Provider due  04/17/13  and every 1  year(s)           DM - Foot Exam due  10/16/14  and every 1  year(s)           Breast Cancer Screen due  11/30/17  and every 1  year(s)        Due            Fall Risk Screen (Female) due  07/18/18  and every 1  year(s)           Influenza Vaccine due  07/18/18  and every 1  year(s)           Lung Cancer Screen (Female) due  07/18/18  and every 1  year(s)        Near Due            DM - HgbA1c near due  09/13/18  and every 3  month(s)        Refused            Zoster/Shingles Vaccine due  07/18/18  One-time only        Due In Future            DM - Microalbumin not due until  02/06/19  and every 1  year(s)           Lipid Disorders Screen (Female) not due until  02/06/19  and every 1  year(s)           Alcohol Misuse Screen (Female) not due until  02/15/19  and every 1  year(s)           Depression Screen (Female) not due until  02/15/19  and every 1  year(s)           DM - Eye Exam not due until  04/12/19  and every 1  year(s)           High Blood Pressure Screen (Female) not due until  06/20/19  and every 1  year(s)           Tobacco Use Screen (Female) not due until  06/20/19  and every 1  year(s)     Satisfied (in the past 1 year)        Satisfied            Alcohol Misuse Screen (Female) on  02/15/18.           Body Mass Index Check (Female) on  07/18/18.           Body Mass Index Check (Female) on  03/12/18.           DM - Eye Exam on  04/12/18.           DM - HgbA1c on  06/13/18.           DM - HgbA1c on  02/06/18.           DM - HgbA1c on  09/19/17.           DM - Microalbumin on  02/06/18.           DM - Microalbumin on  02/06/18.            Depression Screen (Female) on  02/15/18.           Depression Screen (Female) on  02/15/18.           High Blood Pressure Screen (Female) on  06/20/18.           High Blood Pressure Screen (Female) on  06/13/18.           High Blood Pressure Screen (Female) on  03/12/18.           High Blood Pressure Screen (Female) on  02/15/18.           High Blood Pressure Screen (Female) on  09/26/17.           High Blood Pressure Screen (Female) on  09/26/17.           High Blood Pressure Screen (Female) on  09/26/17.           Lipid Disorders Screen (Female) on  02/06/18.           Lipid Disorders Screen (Female) on  02/06/18.           Lipid Disorders Screen (Female) on  02/06/18.           Lipid Disorders Screen (Female) on  02/06/18.           Obesity Screen and Counseling (Female) on  07/18/18.           Obesity Screen and Counseling (Female) on  06/20/18.           Obesity Screen and Counseling (Female) on  03/12/18.           Obesity Screen and Counseling (Female) on  02/15/18.           Obesity Screen and Counseling (Female) on  09/26/17.           Osteoporosis Screen on  02/28/18.           Tobacco Use Screen (Female) on  06/20/18.           Tobacco Use Screen (Female) on  03/12/18.           Tobacco Use Screen (Female) on  02/15/18.           Tobacco Use Screen (Female) on  09/26/17.        Review / Management   Results review:  Lab results: 6/13/2018 10:07 AM CDT   Hgb A1c                   8.0  HI  .       Impression and Plan   Diagnosis     Diabetes mellitus, type 2 (ALJ95-OV E11.9).     Insulin long-term use (AVD20-YL Z79.4).     Hypertension (UJD73-DT I10).     Hyperlipemia (HXU75-HU E78.2).       Counseled: Patient, [facility] Self Care Behaviors   Today the patient was provided with a review and further instruction on the progression of diabetes mellitus, prevention of complications, BG testing, and lifestyle guidelines.  Healthy Eating - reviewed what foods are carbohydrates and how they affect BG readings,  carbohydrate countining, and using an insulin to carbohydrate ratio.  Pt was able to understand I:C ratio and feels comfortable. reading food labels, appropriate portion sizes, well balanced meals, diabetic plate method as a general rule.  Follow Therapeutic Lifestyle Changes (TLC) nutrition plan for heart healthy eating.    Patient is provided with numerous ideas on how to incorporate dietary recommendations, increase meal planning and preperation, portion control and mindful eating and weight loss tips  - use meal replacement drinks   Encouraged avoidance of SSB unless treating a low glucose   Being Active - Education on how exercise helps with :    - lowering BG by increasing the muscles ability to take up and use glucose   - weight loss   - healthier heart (improve lipid profile)   - improve sleep, mood, energy   - decrease stress  Monitoring - Pt is instructed on recommended testing schedule and blood glucose target levels.    Taking Medication - education on the insulin action time and encouraged to always take Novolog prior to meals; pt was given integrated diabetes services spreadsheet Target 120, Sensitivity 20, Insulin: carb ratio 4  Education on s/sx of hypoglycemia and treatment protocol,   Problem Solving - medical support team assistance, resources provided (written and apps, internet); good control of stress  Healthy Coping - support of friends, family, and medical support team   Reducing Risks - Recommend rountine eye apt, adequate sleep, importance of controlling BG to prevent developing complications related to uncontrolled DM including nephropathy, neuropathy, retinopathy, and cardiovascular disease.  Discussed importance of proper skin, dental, foot and eye care.  Weight loss goal of 7 - 10% of current body weight pounds as a reasonable goal to help increase insulin sensitivity    Plan:  1.  Incorporate healthy lifestyle interventions; adequate sleep (7 - 8hrs), good control of stress, monitor  carbohydrate intake through carb counting, and be physically active (30 min 5x/wk).    2.  BG Testing 3x/day   Goals: before meals 100 - 120, 2 hrs after a meal 90 - 140mg/dL  3.  Toujeo 37u HS and gradually increase 1 unit until am is in goal range, Novolog 1unit for every 4 grams CHO.  Plus Humalog correction scale as needed with sensitivity of 20; Target 120    4.   follow up in 4 - 6 months       Professional Services   Time spent with pt 60 min   cc Dr. Edgardo Luong

## 2022-02-16 NOTE — TELEPHONE ENCOUNTER
---------------------  From: Paula Concepcion CMA   Sent: 7/9/2020 3:11:35 PM CDT  Subject: General Message-due for appt     LM for a return call at 1511- due for fasting labs and f/u  can schedule labs along with vitals and f/u can be a video visit if she prefers

## 2022-02-16 NOTE — CARE COORDINATION
Pt appears on  [facility] chronic disease panel as out of parameters forelevated A1c.  RTC in 3 months for repeat a1c placed-[facility]  Mirtha Franco cma.

## 2022-02-16 NOTE — NURSING NOTE
CAGE Assessment Entered On:  2/20/2020 1:21 PM CST    Performed On:  2/20/2020 1:21 PM CST by Korin Moreau MA               Assessment   Have you ever felt you should cut down on your drinking :   No   Have people annoyed you by criticizing your drinking :   No   Have you ever felt bad or guilty about your drinking :   No   Have you ever taken a drink first thing in the morning to steady your nerves or get rid of a hangover (Eye-opener) :   No   CAGE Score :   0    Korin Moreau MA - 2/20/2020 1:21 PM CST

## 2022-02-16 NOTE — NURSING NOTE
Hearing and Vision Screening Entered On:  9/18/2019 3:46 PM CDT    Performed On:  9/18/2019 3:46 PM CDT by Paula Concepcion CMA               Hearing and Vision Screening   Audiogram Result Right Ear :   Fail   Audiogram Result Left Ear :   Fail   Hearing Screen Comments :   has hearing aids   Paula Concepcion CMA - 9/18/2019 3:46 PM CDT

## 2022-02-16 NOTE — NURSING NOTE
Comprehensive Intake Entered On:  7/22/2020 11:04 AM CDT    Performed On:  7/22/2020 10:58 AM CDT by Paula Concepcion CMA               Summary   Chief Complaint :   f/u chronic    Weight Measured :   169.12 lb(Converted to: 169 lb 2 oz, 76.71 kg)    Height Measured :   61.25 in(Converted to: 5 ft 1 in, 155.57 cm)    Body Mass Index :   31.69 kg/m2 (HI)    Body Surface Area :   1.82 m2   Systolic Blood Pressure :   136 mmHg (HI)    Diastolic Blood Pressure :   64 mmHg   Mean Arterial Pressure :   88 mmHg   Peripheral Pulse Rate :   56 bpm (LOW)    Temperature Tympanic :   96 DegF(Converted to: 35.6 DegC)  (LOW)    Race :      Languages :   English   Paula Concepcion CMA - 7/22/2020 10:58 AM CDT   Health Status   Allergies Verified? :   Yes   Medication History Verified? :   Yes   Medical History Verified? :   No   Pre-Visit Planning Status :   Completed   Tobacco Use? :   Former smoker   Paula Concepcion CMA - 7/22/2020 10:58 AM CDT   ID Risk Screen   Recent Travel History :   No recent travel   Family Member Travel History :   No recent travel   Other Exposure to Infectious Disease :   Unknown   Paula Concepcion CMA 7/22/2020 10:58 AM CDBURT

## 2022-02-16 NOTE — PROGRESS NOTES
Patient:   ALEJANDRA HERRERA            MRN: [id number]            FIN: [id number]               Age:   75 years     Sex:  Female     :  1945   Associated Diagnoses:   Obesity; Diabetes mellitus, type 2; Background Diabetic Retinopathy; Arteriosclerotic Heart Disease (ASHD); Hyperlipemia   Author:   Edgardo Luong MD      Visit Information      Date of Service: 2021 01:36 pm  Performing Location: [facility]  Encounter#: [id number]      Primary Care Provider (PCP):  Edgardo Luong MD, NPI# [id number]      Referring Provider:  Edgardo Luong MD, NPI# [id number]      Chief Complaint   2021 1:49 PM CST    f/u chronic              Additional Information:No additional information recorded during visit.   Chief complaint and symptoms as noted above and confirmed with patient      History of Present Illness   2014: Alejandra presents to clinic to establish care, previously followed by JEB.  She has a history of type 2 diabetes historically with uncontrolled hyperglycemia with hemoglobin A1c consistently above 9%, until approximately 1-1/2 years ago she was transitioned to Lantus basal and Lispro bolus insulin dosing with significant improvement in her glycemic control. Currently taking Lantus 40 units nightly, Humalog 15 units 3 times a day before meals.  She has a history of retinopathy, no known peripheral neuropathy or nephropathy.  She has remote history of atherosclerotic coronary artery disease, undergoing angioplasty in the mid 90s.  Denies any anginal equivalent symptoms.  She has never undergone screening for colorectal cancer. Labs reviewed with her.     2020: Alejandra returns for follow-up.  She has been using a freestyle emily monitor since last visit.a she feels like this is been very helpful on being more accountable with her diet.  Actually has not really increased her kilo daily insulin dose.  She finds if she uses NovoLog beyond 15 units she is more prone to have postprandial lows.   Did briefly try a 7030 premixed though had significant issues with getting her blood sugars down.  Since has reverted back to basal bolus insulin.  We reviewed her findings she is to week and 30-day averages are a significant curve from her baseline.  A1c already improving now down to 7.9%.  Does describe some left ear fullness and diminished hearing acuity.  Does wear hearing aids bilaterally.  She is appropriately socially distancing.  1/26/21: Yoselyn presents to clinic with 1 week history of general malaise, right-sided ear pain and lymphadenitis with associated cough and shortness of breath.  Previously had issues with sore throat and particularly tender anterior cervical lymph nodes.  Now able to swallow with less difficulty.  No described sick contacts.  She says that she is remained isolated.  Her last recollection of leaving the house was over a week ago when running errands in [employer]  2/19/21: Marni returns to clinic for follow-up.  Was seen last month in the setting of flulike symptoms.  Work-up at that time showed stable appearing vitals and normal pulse oximetry.  She did have an x-ray which per radiologist showed diffuse groundglass changes potentially consistent with pneumonitis.  She was advised for home quarantine and treated like person under investigation.  Her Covid PCR testing and was negative.  Subsequently her  has had bronchitis symptoms earlier this month.  Currently she says she feels very well.  Reviewed recent labs demonstrating good overall glycemic control.  She has been pleased with her improvements but concerns of having some morning sugars in the 50s to 70s.  Currently taking Toujeo 36 units nightly.  Brings up hesitancy in pursuing future Covid vaccine.  She shares getting frequently ill after receiving influenza vaccines.         Review of Systems   Constitutional:  No fever, No chills, No weakness, No fatigue.    Eye:  Negative except as documented in history of present  illness.    Ear/Nose/Mouth/Throat:  No sore throat     Decreased hearing: Bilaterally.    Respiratory:  No shortness of breath, No cough, No wheezing.    Cardiovascular:  No chest pain, No palpitations, No peripheral edema, No syncope.    Gastrointestinal:  No vomiting, No constipation, No heartburn, No abdominal pain.    Genitourinary:  No dysuria, No hematuria.    Hematology/Lymphatics:  Negative except as documented in history of present illness.    Endocrine:  No excessive thirst, No polyuria.    Immunologic:  No recurrent fevers, No malaise.    Musculoskeletal:  Joint pain, No back pain, No neck pain, No muscle pain, No decreased range of motion.    Neurologic:  Alert and oriented X4, No numbness, No tingling, No headache.       Health Status   Allergies:    Allergic Reactions (Selected)  Severity Not Documented  Actos (Gi upset,edema)  Avalide (Angioedema)  Avandia (Breathing problem)  Avelox (Shaky)  Benadryl (Hives)  Glucophage (Gi sx)  Lisinopril (Angioedema)  Naprosyn (Hives)  Penicillin (Hives)  PredniSONE (Hives)  Sulfa drug (Hives and nausea and vomitting)  Tylenol (Feet swelling)  Zithromax (Breathing trouble)  Nonallergic Reactions (Selected)  Unknown  Hydrochlorothiazide-irbesartan (Angioedema)  Ibuprofen (No reactions were documented)  Peanuts (Itching)   Medications:  (Selected)   Prescriptions  Prescribed  NovoLOG FlexPen 100 units/mL injectable solution: See Instructions, Instructions: 12-15 units Subcutaneous tidac, # 60 mL, 1 Refill(s), Type: Maintenance, Pharmacy: [id number], keep on hold, 12-15 units Subcutaneous tidac, 61.25, in, 07/22/20 10:58:00 CDT, Height Measured, 169.12, lb, 07/2...  Toujeo Max SoloStar 300 units/mL subcutaneous solution: ( 32 units ), Subcutaneous, daily, # 45 mL, 1 Refill(s), Type: Maintenance, Pharmacy: [id number], keep on hold, 61.25, in, 07/22/20 10:58:00 CDT, Height Measured, 169.12, lb, 07/22/20 10:58:00 CDT, Weight Measured  Ventolin HFA  90 mcg/inh inhalation aerosol: See Instructions, Instructions: 1-2 puff(s) Inhale q2-4hr, PRN: as needed for wheezing, # 1 EA, 1 Refill(s), Type: Maintenance, Pharmacy: [place] [id number], 1-2 puff(s) Inhale q2-4hr,PRN:as needed for wheezing  amLODIPine 5 mg oral tablet: = 1 tab(s), Oral, daily, # 90 tab(s), 1 Refill(s), Type: Maintenance, Pharmacy: [facility], keep on file-pt will notify when needed, 1 tab(s) Oral daily, 61.25, in, 07/22/20 10:58:00 CDT, Height Measured, 169.12, lb, 07/22/20 10:58:00 CDT,...  aspirin 325 mg oral tablet: 1 tab(s) ( 325 mg ), PO, Daily, # 30 tab(s), 0 Refill(s), Type: Maintenance, OTC (Rx)  atenolol-chlorthalidone 50 mg-25 mg oral tablet: 0.5 tab(s), Oral, daily, # 45 tab(s), 1 Refill(s), Type: Maintenance, Pharmacy: [facility], keep on file-pt will notify when needed, 0.5 tab(s) Oral daily, 61.25, in, 07/22/20 10:58:00 CDT, Height Measured, 169.12, lb, 07/22/20 10:58:00 CDT...  atorvastatin 40 mg oral tablet: = 1 tab(s), Oral, daily, # 90 tab(s), 1 Refill(s), Type: Maintenance, Pharmacy: [facility], keep on file-pt will notify when needed, 1 tab(s) Oral daily, 61.25, in, 07/22/20 10:58:00 CDT, Height Measured, 169.12, lb, 07/22/20 10:58:00 CDT,...  nitroglycerin 0.4 mg sublingual tablet: = 1 tab(s), SL, q5min, PRN: for chest pain, # 25 tab(s), 1 Refill(s), Type: Maintenance, Pharmacy: [id number], keep on hold and pt will notify when needed, 1 tab(s) SL q5 min,PRN:for chest pain  pantoprazole 40 mg oral delayed release tablet: = 1 tab(s) ( 40 mg ), po, daily, # 90 tab(s), 1 Refill(s), Type: Maintenance, Pharmacy: [facility], keep on file and pt will notify when needed, 1 tab(s) Oral daily, 61.25, in, 07/22/20 10:58:00 CDT, Height Measured, 169.12, lb, 07/22/20 10...  traMADol 50 mg oral tablet: See Instructions, Instructions: TAKE 1 TABLET BY MOUTH EVERY 4 HOURS AS NEEDED FOR PAIN, # 30 tab(s), Type: Soft Stop, Pharmacy:  [id number], TAKE 1 TABLET BY MOUTH EVERY 4 HOURS AS NEEDED FOR PAIN,    Medications          *denotes recorded medication          Ventolin HFA 90 mcg/inh inhalation aerosol: See Instructions, 1-2 puff(s) Inhale q2-4hr, PRN: as needed for wheezing, 1 EA, 1 Refill(s).          amLODIPine 5 mg oral tablet: 1 tab(s), Oral, daily, 90 tab(s), 1 Refill(s).          aspirin 325 mg oral tablet: 325 mg, 1 tab(s), PO, Daily, 30 tab(s).          atenolol-chlorthalidone 50 mg-25 mg oral tablet: 0.5 tab(s), Oral, daily, 45 tab(s), 1 Refill(s).          atorvastatin 40 mg oral tablet: 1 tab(s), Oral, daily, 90 tab(s), 1 Refill(s).          NovoLOG FlexPen 100 units/mL injectable solution: See Instructions, 12-15 units Subcutaneous tidac, 60 mL, 1 Refill(s).          Toujeo Max SoloStar 300 units/mL subcutaneous solution: 32 units, Subcutaneous, daily, 45 mL, 1 Refill(s).          nitroglycerin 0.4 mg sublingual tablet: 1 tab(s), SL, q5min, PRN: for chest pain, 25 tab(s), 1 Refill(s).          pantoprazole 40 mg oral delayed release tablet: 40 mg, 1 tab(s), po, daily, 90 tab(s), 1 Refill(s).          traMADol 50 mg oral tablet: See Instructions, TAKE 1 TABLET BY MOUTH EVERY 4 HOURS AS NEEDED FOR PAIN, 30 tab(s).       Problem list:    All Problems  Acute low back pain / SNOMED CT 769690002 / Confirmed  Arteriosclerotic heart disease (ASHD) / SNOMED CT 57598761 / Confirmed  GERD (gastroesophageal reflux disease) / SNOMED CT 179975483 / Confirmed  Glaucoma / SNOMED CT 03391130 / Confirmed  Hypertension / SNOMED CT 8242581573 / Confirmed  Insulin long-term use / SNOMED CT 1520862030 / Confirmed  Hyperlipemia / SNOMED CT 318718060 / Confirmed  Background diabetic retinopathy / SNOMED CT 0378295159 / Confirmed  Obesity / SNOMED CT 6930137176 / Probable  Diabetes mellitus, type 2 / SNOMED CT 058054650 / Confirmed  Inactive: Tobacco user / ICD-9-.1  Resolved: *Hospitalized@[email or web address] - Acute low back pain, L4 nerve root  impingement  Canceled: Long term current use of oral hypoglycemic drug / SNOMED CT 656715211  Canceled: Obese / ICD-9-.00      Histories   Past Medical History:    Active  Diabetes mellitus, type 2 (805392883): Onset in  at 51 years.  Comments:  2010 CST 3:00 PM CST Adwoa Concepcion Paula CARDENAS  started Insulin 2002  Background diabetic retinopathy (1328385964)  Arteriosclerotic heart disease (ASHD) (52598348)  Comments:  2010 CST 3:02 PM SAADIA Concepcion Paula CARDENAS  Coronary Angioplasty and Stents x 2    2010 CDT 2:14 PM CDT - Lew Huggins MD  &   GERD (gastroesophageal reflux disease) (538451458)  Hypertension (5801054600)  Hyperlipemia (587167766)  Acute low back pain (961338952)  Comments:  2012 CDT 10:46 AM Lew Christensen MD  L3-L4 disc with L4 nerve root impingement  Obesity (4596533820)  Glaucoma (32373224)  Comments:  2012 CDT 1:46 PM CDT - Brooke Barraza  Right eye  Resolved  *[email or web address] - Acute low back pain, L4 nerve root impingement: Onset on 2012 at 66 years.  Resolved.   Family History:    Suicide  Father ()  Kidney stone  Son (Elliot)  Diabetes mellitus type II  Mother ()  Asthma  Sister (Shira)  Heart attack  Mother ()  Son (Terrence)  CABG - Coronary artery bypass graft  Son (Terrence)  Coronary heart disease  Mother ()     Procedure history:    Esophagogastroduodenoscopy (774168044) on 2017 at 71 Years.  Comments:  2/10/2017 12:02 PM Vinayak Park MD  Gastric ulcer  Coronary angiogram (96426294) in the month of 2010 at 64 Years.  Comments:  2010 10:18 AM Lew Christensen MD  no intervention with stents  OD cataract with IOL in the month of 2006 at 61 Years.  glaucoma surgery OD in the month of 3/2006 at 60 Years.  coronary angioplasty with stents in  at 55 Years.  Coronary angioplasty (00509931) in  at 52 Years.  DEE - Total abdominal hysterectomy and bilateral salpingo-oophorectomy  (8833687827) in 1975 at 30 Years.  Comments:  4/21/2010 1:59 PM CDT - Hoda Fortune  benign fibroids  Cholecystectomy (96425572) in 1968 at 23 Years.  Appendectomy (469254236) in 1960 at 15 Years.  Tonsillectomy (764353320) in 1956 at 11 Years.  Dilatation and curettage (3228398637).  Comments:  4/21/2010 1:59 PM CDT - Hoda Fortune  benign   Social History:        Electronic Cigarette/Vaping Assessment            Electronic Cigarette Use: Never.      Alcohol Assessment            Never      Tobacco Assessment            Former smoker, quit more than 30 days ago            Past                     Comments:                      09/25/2012 - Christi  Brooke                     Quit in 2000      Employment and Education Assessment            Retired, Work/School description: [employer].        Physical Examination   vital signs stable, as noted above   Vital Signs   2/19/2021 2:29 PM CST Peripheral Pulse Rate 50 bpm  LOW   2/19/2021 1:49 PM CST Temperature Tympanic 97.6 DegF  LOW    Peripheral Pulse Rate 108 bpm  HI    Systolic Blood Pressure 126 mmHg    Diastolic Blood Pressure 60 mmHg    Mean Arterial Pressure 82 mmHg    Oxygen Saturation 96 %      Measurements from flowsheet : Measurements   2/19/2021 1:49 PM CST    Weight Measured - Standard                170.4 lb     General:  Alert and oriented, No acute distress.    Eye:  Pupils are equal, round and reactive to light, Extraocular movements are intact, Normal conjunctiva.    HENT:  Normocephalic, ceruminosis bilaterally.    Neck:  Supple, Non-tender.    Respiratory:  Lungs are clear to auscultation, Respirations are non-labored.    Cardiovascular:  Normal rate, Regular rhythm, No murmur, No edema.    Musculoskeletal:  Normal strength.    Neurologic:  Alert, Oriented, Normal motor function, No focal deficits, Cranial Nerves II-XII are grossly intact.    Cognition and Speech:  Oriented, Speech clear and coherent, Functional cognition intact.    Psychiatric:   Appropriate mood & affect.       Review / Management   Results review:  Lab results   1/26/2021 3:00 PM CST Coronavirus SARS-CoV-2 (COVID-19) TR Negative   1/26/2021 2:51 PM CST Sodium Level 143 mmol/L    Potassium Level 4.5 mmol/L    Chloride Level 111 mmol/L    CO2 Level 26 mmol/L    AGAP 6    Glucose Level 118 mg/dL    BUN 21 mg/dL    Creatinine 1.43 mg/dL    BUN/Creat Ratio 15    eGFR  43    eGFR Non-African American 36    Calcium Level 9.1 mg/dL    WBC 9.3    RBC 3.60    Hgb 10.3 g/dL    Hct 31.8 %    MCV 88 fL    MCH 28.6 pg    MCHC 32.4 g/dL    RDW 13.3 %    Platelet 213    MPV 10.9 fL    Lymphs 23.7 %    Abs Lymphs 2.2    Neutrophils 63.6 %    Abs Neutrophils 5.9    Monocytes 9.6 %    Abs Monocytes 0.9    Eosinophils 2.7 %    Abs Eosinophils 0.3    Basophils 0.4 %    Abs Basophils 0.0   1/26/2021 2:33 PM CST C-Reactive Protein (CRP) 1.4 mg/L    Hgb A1c 7.3  HI    Procalcitonin (PCT) <0.10 ng/mL   .       Impression and Plan   Diagnosis     Obesity (HEV96-XY E66.9).     Diabetes mellitus, type 2 (TDD18-OJ E11.9).     Background Diabetic Retinopathy (NBT20-LJ E11.311).     Arteriosclerotic Heart Disease (ASHD) (ODH23-UX I25.10).     Hyperlipemia (LQD73-XT E78.2).         .) recent URI/viral illness in 1/2021, COVID-like features  - SpO2 94% on ambient air; no respiratory distress  - CXR: groundglass, pneumonitis like appearance per radiologist  - CBC: normal WBC, normal diff  - COVID PCR: negative  - I will draw COVID Ab level today   - if positive, would advise on no COVID vaccination until May (Yoselyn expresses vaccine hesitancy anyway)   - if negative, would still very much recommend vaccination when available    .) GERD/h/o esophagitis   - EGD in 12/2016 showing gastric ulcer - previous symptoms now resolved   - pantoprazole 40mg daily   - denies any NSAIDs    .) type II DM, controlled   hypoglycemic medications: intolerant to metformin due to GI symptoms  insulin therapy: Toujeo 36 units,  Humalog 12-15 units TID + SSI   - advised to reduce Toujeo down to 32 units given increased frequency of AM hypos (50s-70s)   - ICR 1:3, sensitivity 10 (~20 units q meal)   - could consider future GLP1 agonist   - using CGM/Freestyle Adry and seeing significant benefits from use  last HbA1c: 7.3%   microvascular complications: proliferative retinopathy - sees ophtho regularly, no microalbuminuria  macrovascular complications: CAD  ASA/GILDA/statin:  ASA 325mg daily, no microalbuminuira, atorvastatin 40mg    - previous myalgias on higher dose of statin    Patient has met criteria for CGM coverage;   - patient has type 2 diabetes mellitus; and  - patient has been using a blood glucose monitor and performing four or more blood glucose test per day; and  - patient is insulin treated with multiple daily injections of three or more times per day; and  - within six months prior to ordering the CGM, the patient has had an in-person visit with the practitioner; and determined that criteria (1-4) above are met; and   - every six months following the initial prescription of the CGM, the treating practitioner has an in-person visit with the patient to assess adherence to their CGM regimen and diabetes treatment plan     .) HTN, controlled  current antihypertensive regimen: atenolol/chlorthalidone 25/12.5mg, amlodipine 5mg,   regimen changes:  intolerance:  future titration/work-up plan: low threshold to change/add ACEI if development of microalbuminuria    .) CAD   - Lexiscan (12/2016) - no reversible defects   - previously intolerant to higher dose ISMN, she does have known atherostenotic disease involving LCx   - ASA, atenolol 25mg daily, atorvastatin 40mg QHS,    - lower threshold for PCI given historic intolerance to long-acting nitrates    .) Health maintenance   - declines any means of CRC screening   - DEXA '18: osteopenia   - prior DEE   - q2 yr mammography    RTC in 6 months           Professional Services   Counseling  Summary:  This was a 25 minute visit with greater than 50% of that time spent counseling the patient.

## 2022-02-16 NOTE — PROGRESS NOTES
Patient:   ALEJANDRA HERRERA            MRN: [id number]            FIN: [id number]               Age:   74 years     Sex:  Female     :  1945   Associated Diagnoses:   Diabetes mellitus, type 2; Insulin long-term use; Hypertension; Hyperlipemia   Author:   Carmen Patricia      Visit Information   Visit type:  Diabetes Self Management Education .    Referral source:  Ag STINSON, Edgardo.       Chief Complaint   DM Type II on insulin, Hyperlipidemia, Hypertension, Obesity       History of Present Illness   Discussed diet, diabetes, and lifestyle management with pt   Nutrition:  am oatmeal or egg and toast with water to drink, noon meal protein, vegetable, fruit, evening leftovers or a sandwich, infrequent snacks   Physical Activity:  Farm chores  Stress:  mod  Routine diabetes care: Up to date on yearly eye exam 20, wears dentures and brushes daily, foot exams with MD, does not want influenza vaccine   Insulin: amounts are actually less than last RD visit, pt consistently takes insulin but cuts down on dosing due to cost.  Pt would like to discuss alternative options.  States she is paying $500 for her insulin.  Toujeo 36units, Novolog 10 - 15u TID ac (is trying to use the I:C ratio spreadsheet and correction factor 1u: 3gm CHO and correction factor of 10  BG Testinmg/dL in office ~ 3 hours after   BG testing 4x/ day   Patient has met criteria for CGM coverage;   - patient has type two diabetes mellitus; and  - patient has been using a blood glucose monitor and performing four or more blood glucose test per day; and  - patient is insulin treated with multiple daily injections of three or more times per day; and  - patient is requiring frequent insulin adjustments on the basis of blood glucose readings  - within six months prior to ordering the CGM, the patient has had an in-person visit with the practitioner; and determined that criteria (1-4) above are met; and   - every six months following the initial  prescription of the CGM, the treating practitioner has an in-person visit with the patient to assess adherence to their CGM regimen and diabetes treatment plan   A1C 9.6 % = 229 mg/dL estimated Average Glucose (eAG)        Health Status   Allergies:    Allergic Reactions (Selected)  Severity Not Documented  Actos (Gi upset,edema)  Avalide (Angioedema)  Avandia (Breathing problem)  Avelox (Shaky)  Benadryl (Hives)  Glucophage (Gi sx)  Lisinopril (Angioedema)  Naprosyn (Hives)  Penicillin (Hives)  PredniSONE (Hives)  Sulfa drug (Hives and nausea and vomitting)  Tylenol (Feet swelling)  Zithromax (Breathing trouble)  Nonallergic Reactions (Selected)  Unknown  Hydrochlorothiazide-irbesartan (Angioedema)  Ibuprofen (No reactions were documented)  Peanuts (Itching)   Medications:  (Selected)   Prescriptions  Prescribed  1/2 cc insulin syringes (uses 2 daily) and One touch test strips and lancets: 1/2 cc insulin syringes (uses 2 daily) and One touch test strips and lancets, See Instructions, Instructions: uses 2 daily, Supply, # 100 EA, 5 Refill(s), Type: Maintenance, Pharmacy: [id number], uses 2 daily  NovoLOG FlexPen 100 units/mL injectable solution: See Instructions, Instructions: INJECT 15 UNITS SUBCUTANEOUSLY THREE TIMES DAILY BEFORE MEALS, # 45 mL, 3 Refill(s), Type: Soft Stop, Pharmacy: [facility], keep on file-pt will notify when needed, INJECT 15 UNITS SUBCUTANEOUSLY THREE TIMES...  ONE TOUCH ULTRA TEST STRIPS: ONE TOUCH ULTRA TEST STRIPS, See Instructions, Instructions: TESTING qid - E11.9, Supply, # 400 EA, 3 Refill(s), Type: Maintenance, Pharmacy: [id number], keep on file and pt will notify when needed, TESTING qid - E11.9  One Touch Ultra Blood glucose meter: One Touch Ultra Blood glucose meter, See Instructions, Instructions: use as directed - E11.9, Supply, # 1 EA, 0 Refill(s), Type: Maintenance, Pharmacy: [id number], use as directed - E11.9  Toujeo SoloStar 300  units/mL subcutaneous solution: See Instructions, Instructions: INJECT 35 UNITS SUBCUTANEOUSLY ONCE DAILY. MAY INCREASE 1 UNIT UNTIL SUGAR IS BELOW 130, # 46 mL, 3 Refill(s), Type: Soft Stop, Pharmacy: [facility], keep on file-pt will notify when needed, INJECT 35 UNITS S...  Ventolin HFA 90 mcg/inh inhalation aerosol: See Instructions, Instructions: 1-2 puff(s) Inhale q2-4hr, PRN: as needed for wheezing, # 1 EA, 1 Refill(s), Type: Maintenance, Pharmacy: [facility], 1-2 puff(s) Inhale q2-4hr,PRN:as needed for wheezing  amLODIPine 5 mg oral tablet: = 1 tab(s), Oral, daily, # 90 tab(s), 1 Refill(s), Type: Maintenance, Pharmacy: [facility], keep on file-pt will notify when needed, 1 tab(s) Oral daily  aspirin 325 mg oral tablet: 1 tab(s) ( 325 mg ), PO, Daily, # 30 tab(s), 0 Refill(s), Type: Maintenance, OTC (Rx)  atenolol-chlorthalidone 50 mg-25 mg oral tablet: 0.5 tab(s), Oral, daily, # 45 tab(s), 1 Refill(s), Type: Maintenance, Pharmacy: [facility], keep on file-pt will notify when needed, 0.5 tab(s) Oral daily  atorvastatin 40 mg oral tablet: = 1 tab(s), Oral, daily, # 90 tab(s), 1 Refill(s), Type: Maintenance, Pharmacy: [facility], keep on file-pt will notify when needed, 1 tab(s) Oral daily  nitroglycerin 0.4 mg sublingual tablet: = 1 tab(s), SL, q5min, PRN: for chest pain, # 25 tab(s), 1 Refill(s), Type: Maintenance, Pharmacy: [facility], keep on hold and pt will notify when needed, 1 tab(s) SL q5 min,PRN:for chest pain  pantoprazole 40 mg oral delayed release tablet: = 1 tab(s) ( 40 mg ), po, daily, # 90 tab(s), 1 Refill(s), Type: Maintenance, Pharmacy: [id number], keep on file and pt will notify when needed, 1 tab(s) Oral daily  short insulin pen needles: short insulin pen needles, See Instructions, Instructions: insulin shots 4x/day, Supply, # 400 EA, 3 Refill(s), Type: Maintenance, Pharmacy: [id number], keep on hold and  pt will notify when needed, insulin shots 4x/day  traMADol 50 mg oral tablet: See Instructions, Instructions: TAKE 1 TABLET BY MOUTH EVERY 4 HOURS AS NEEDED FOR PAIN, # 30 tab(s), Type: Soft Stop, Pharmacy: [id number], TAKE 1 TABLET BY MOUTH EVERY 4 HOURS AS NEEDED FOR PAIN,    Medications          *denotes recorded medication          ONE TOUCH ULTRA TEST STRIPS: See Instructions, TESTING qid - E11.9, 400 EA, 3 Refill(s).          short insulin pen needles: See Instructions, insulin shots 4x/day, 400 EA, 3 Refill(s).          1/2 cc insulin syringes (uses 2 daily) and One touch test strips and lancets: See Instructions, uses 2 daily, 100 EA.          One Touch Ultra Blood glucose meter: See Instructions, use as directed - E11.9, 1 EA, 0 Refill(s).          Ventolin HFA 90 mcg/inh inhalation aerosol: See Instructions, 1-2 puff(s) Inhale q2-4hr, PRN: as needed for wheezing, 1 EA, 1 Refill(s).          amLODIPine 5 mg oral tablet: 1 tab(s), Oral, daily, 90 tab(s), 1 Refill(s).          aspirin 325 mg oral tablet: 325 mg, 1 tab(s), PO, Daily, 30 tab(s).          atenolol-chlorthalidone 50 mg-25 mg oral tablet: 0.5 tab(s), Oral, daily, 45 tab(s), 1 Refill(s).          atorvastatin 40 mg oral tablet: 1 tab(s), Oral, daily, 90 tab(s), 1 Refill(s).          NovoLOG FlexPen 100 units/mL injectable solution: See Instructions, INJECT 15 UNITS SUBCUTANEOUSLY THREE TIMES DAILY BEFORE MEALS, 45 mL, 3 Refill(s).          Toujeo SoloStar 300 units/mL subcutaneous solution: See Instructions, INJECT 35 UNITS SUBCUTANEOUSLY ONCE DAILY. MAY INCREASE 1 UNIT UNTIL SUGAR IS BELOW 130, 46 mL, 3 Refill(s).          nitroglycerin 0.4 mg sublingual tablet: 1 tab(s), SL, q5min, PRN: for chest pain, 25 tab(s), 1 Refill(s).          pantoprazole 40 mg oral delayed release tablet: 40 mg, 1 tab(s), po, daily, 90 tab(s), 1 Refill(s).          traMADol 50 mg oral tablet: See Instructions, TAKE 1 TABLET BY MOUTH EVERY 4 HOURS AS NEEDED  FOR PAIN, 30 tab(s).       Problem list:    All Problems  Acute low back pain / SNOMED CT 907291569 / Confirmed  Arteriosclerotic heart disease (ASHD) / SNOMED CT 88903788 / Confirmed  GERD (gastroesophageal reflux disease) / SNOMED CT 050274933 / Confirmed  Glaucoma / SNOMED CT 98440410 / Confirmed  Hypertension / SNOMED CT 2081906074 / Confirmed  Insulin long-term use / SNOMED CT 1132561433 / Confirmed  Hyperlipemia / SNOMED CT 423622993 / Confirmed  Background diabetic retinopathy / SNOMED CT 3251441170 / Confirmed  Obesity / SNOMED CT 5231423748 / Probable  Diabetes mellitus, type 2 / SNOMED CT 437731554 / Confirmed  Inactive: Tobacco user / ICD-9-.1  Resolved: *Hospitalized@[facility] - Acute low back pain, L4 nerve root impingement  Canceled: Long term current use of oral hypoglycemic drug / SNOMED CT 747669067  Canceled: Obese / ICD-9-.00      Histories   Past Medical History:    Active  Diabetes mellitus, type 2 (381563632): Onset in  at 51 years.  Comments:  2010 CST 3:00 PM CST - Jamey Paula CARDENAS  started Insulin 2002  Background diabetic retinopathy (1149389007)  Arteriosclerotic heart disease (ASHD) (36739737)  Comments:  2010 CST 3:02 PM CST Adwoa Juan Josejose Paula CARDENAS  Coronary Angioplasty and Stents x 2    2010 CDT 2:14 PM CDT - Lew Huggins MD  &   GERD (gastroesophageal reflux disease) (667728578)  Hypertension (3411820234)  Hyperlipemia (212365600)  Acute low back pain (371227561)  Comments:  2012 CDT 10:46 AM CDT - Lew Huggins MD  L3-L4 disc with L4 nerve root impingement  Obesity (5754732955)  Glaucoma (72045172)  Comments:  2012 CDT 1:46 PM CDT - Brooke Barraza  Right eye  Resolved  *Hospitalized@[facility] - Acute low back pain, L4 nerve root impingement: Onset on 2012 at 66 years.  Resolved.   Family History:    Suicide  Father ()  Kidney stone  Son (Elliot)  Diabetes mellitus type II  Mother ()  Asthma  Sister (Shira)  Heart  attack  Mother ()  Son (Terrence)  CABG - Coronary artery bypass graft  Son (Terrence)  Coronary heart disease  Mother ()     Procedure history:    Esophagogastroduodenoscopy (SNOMED CT 945969719) on 2017 at 71 Years.  Comments:  2/10/2017 12:02 PM CST - Vinayak Lei MD  Gastric ulcer  Coronary angiogram (SNOMED CT 11545534) in the month of 2010 at 64 Years.  Comments:  2010 10:18 AM CDT - Lew Huggins MD  no intervention with stents  OD cataract with IOL in the month of 2006 at 61 Years.  glaucoma surgery OD in the month of 3/2006 at 60 Years.  coronary angioplasty with stents in  at 55 Years.  Coronary angioplasty (SNOMED CT 08803017) in  at 52 Years.  DEE - Total abdominal hysterectomy and bilateral salpingo-oophorectomy (SNOMED CT 2015872557) in  at 30 Years.  Comments:  2010 1:59 PM CDT - Hoda Fortune  benign fibroids  Cholecystectomy (SNOMED CT 68245466) in  at 23 Years.  Appendectomy (SNOMED CT 928193840) in  at 15 Years.  Tonsillectomy (SNOMED CT 918200616) in  at 11 Years.  Dilatation and curettage (SNOMED CT 9009351850).  Comments:  2010 1:59 PM CDT - Hoda Fortune  benign   Social History:        Alcohol Assessment            Never      Tobacco Assessment            Past                     Comments:                      2012 - Brooke Barraza                     Quit in       Employment and Education Assessment            Retired, Work/School description: [employer].        Physical Examination   Measurements from flowsheet : Measurements   3/17/2020 5:01 PM CDT Height Measured - Standard 61.25 in    Weight Measured - Standard 167.8 lb    BSA 1.81 m2    Body Mass Index 31.44 kg/m2  HI         Review / Management   Results review:  Lab results: 2020 9:00 AM CST     Hgb A1c                   9.6  HI  .       Impression and Plan   Diagnosis     Diabetes mellitus, type 2 ([id number]-XJ E11.9).     Insulin long-term use (FRG42-BF  "Z79.4).     Hypertension (LXH42-AS I10).     Hyperlipemia (PWJ84-UX E78.2).       Counseled: Patient, TIRSO Self Care Behaviors   Today the patient was provided with a review and further instruction on the progression of diabetes mellitus, prevention of complications, BG testing, and lifestyle guidelines.  Healthy Eating - reviewed what foods are carbohydrates and how they affect BG readings, carbohydrate counting, and using an insulin to carbohydrate ratio.  Pt was able to understand I:C ratio and feels comfortable. reading food labels, appropriate portion sizes, well balanced meals, diabetic plate method as a general rule.  Follow Therapeutic Lifestyle Changes (TLC) nutrition plan for heart healthy eating.    Patient is provided with numerous ideas on how to incorporate dietary recommendations, increase meal planning and preparation, portion control and mindful eating and weight loss tips  - use meal replacement drinks   Encouraged avoidance of SSB unless treating a low glucose   Being Active - Education on how exercise helps with :    - lowering BG by increasing the muscles ability to take up and use glucose   - weight loss   - healthier heart (improve lipid profile)   - improve sleep, mood, energy   - decrease stress  Monitoring - Pt is instructed on recommended testing schedule and blood glucose target levels.  Continue to test 4x/ day.  Education on options for sensors.  Pt had tried to get approved previously but \"something did not go right\" and pt did not receive the sensor.  Will try again to get the Freestyle Adry 14 day sensor approved.  Pt instructed on basic use and insertion instructions.  Pt also shown video.    Taking Medication - Discussed types of insulins and action times.  Pt had previously been on mixed 70/30 BID and was unable to titrate to higher doses without hypoglycemia then she was transitioned to MDI.  Pt would like to try mixed insulin again [employer] Relion NovoLIN 70/30 and will take " smaller doses but TID.  Pt agrees and would like to try as what she is doing now is not working for her financially or with DM control.   NovoLIN 70/30 starting with 15u: 25u: 25u taking 30 minutes prior to meals, educated pt on if she is not eating she is not to take insulin.  Prevention of hypoglycemia important.  Pt will stay in contact for frequent glucose adjustments.  Pt will discontinue Toujeo and Novolog once supply is out will start Relion pre-mixed insulin. Pt instructed on how to roll insulin for equal cloudy distribution throughout insulin pen.    Problem Solving - medical support team assistance, resources provided (written and apps, internet); good control of stress  Healthy Coping - support of friends, family, and medical support team   Reducing Risks - Recommend routine eye apt, adequate sleep, importance of controlling BG to prevent developing complications related to uncontrolled DM including nephropathy, neuropathy, retinopathy, and cardiovascular disease.  Discussed importance of proper skin, dental, foot and eye care.  Weight loss goal of 7 - 10% of current body weight pounds as a reasonable goal to help increase insulin sensitivity    Plan:  1.  Incorporate healthy lifestyle interventions; adequate sleep (7 - 8hrs), good control of stress, monitor carbohydrate intake through carb counting, and be physically active (30 min 5x/wk).    2.  BG Testing 4x/day   Goals: before meals 100 - 120, 2 hrs after a meal 90 - 140mg/dL  3.  Once Toujeo and Novolog supply out will start [employer] Relion NovoLIN 70/30 increasing dose as directed slowly to reach target glucose 15:25:25   4.   Freestyle Adry paperwork started to be faxed in with MD note and MD signature.        Professional Services   Time spent with pt 60 min   cc Dr. Edgardo Luong

## 2022-02-16 NOTE — PROGRESS NOTES
Patient:   ALEJANDRA HERRERA            MRN: [id number]            FIN: [id number]               Age:   72 years     Sex:  Female     :  1945   Associated Diagnoses:   Obesity; Diabetes Mellitus, Type 2; Background Diabetic Retinopathy; Arteriosclerotic Heart Disease (ASHD); Hyperlipemia   Author:   Edgardo Luong MD      Visit Information      Date of Service: 02/15/2018 01:19 pm  Performing Location: [facility]  Encounter#: [id number]      Primary Care Provider (PCP):  Edgardo Luong MD    NPI# [id number]      Referring Provider:  Edgardo Luong MD, NPI# [id number]      Chief Complaint   2/15/2018 1:28 PM CST    DM check              Additional Information:No additional information recorded during visit.   Chief complaint and symptoms as noted above and confirmed with patient      History of Present Illness   2014: Alejandra presents to clinic to [facility] care, previously followed by JEB.  She has a history of type 2 diabetes historically with uncontrolled hyperglycemia with hemoglobin A1c consistently above 9%, until approximately 1-1/2 years ago she was transitioned to Lantus basal and Lispro bolus insulin dosing with significant improvement in her glycemic control. Currently taking Lantus 40 units nightly, Humalog 15 units 3 times a day before meals.  She has a history of retinopathy, no known peripheral neuropathy or nephropathy.  She has remote history of atherosclerotic coronary artery disease, undergoing angioplasty in the mid 90s.  Denies any anginal equivalent symptoms.  She has never undergone screening for colorectal cancer. Labs reviewed with her.     2017: Returns for follow-up related to her type 2 diabetes.  Her hemoglobin A1c has improved by half a percent though remains above goal.  Does not bring any blood sugar log for review the states that morning sugars typically are controlled.  Currently taking Toujeo 35 units at night.  Currently following insulin to carbohydrate ratio of  1:5.  She is working with the care coordinators.  Expresses no interest in colorectal cancer screening.  Prefers every 2 year mammography    2/15/2018: Returns to clinic for follow-up related to her diabetes.  We reviewed her labs today.  A1c staying above range; currently 8.5%.  Not following a dedicated carb counting diet.  Taking to [facility] 35 units at night.  Typically taking Humalog 15 units with lunch and dinner.  Would like to see hand specialist related to left-sided fourth finger trigger finger.         Review of Systems   Constitutional:  No fever, No chills.    Eye:  Negative except as documented in history of present illness.    Ear/Nose/Mouth/Throat:  Negative except as documented in history of present illness.    Respiratory:  No shortness of breath.    Cardiovascular:  Chest pain, No palpitations, No peripheral edema, No syncope.    Gastrointestinal:  No vomiting, No constipation, No heartburn, No abdominal pain.    Genitourinary:  No dysuria, No hematuria.    Hematology/Lymphatics:  Negative except as documented in history of present illness.    Endocrine:  No excessive thirst, No polyuria.    Immunologic:  No recurrent fevers.    Musculoskeletal:  Decreased range of motion, No back pain, No neck pain, No joint pain, No muscle pain.    Neurologic:  Alert and oriented X4, No numbness, No tingling.       Health Status   Allergies:    Allergic Reactions (Selected)  Severity Not Documented  Actos (Gi upset,edema)  Avalide (Angioedema)  Avandia (Breathing problem)  Avelox (Shaky)  Benadryl (Hives)  Glucophage (Gi sx)  Lisinopril (Angioedema)  Naprosyn (Hives)  Penicillin (Hives)  PredniSONE (Hives)  Sulfa drug (Hives)  Tylenol (Feet swelling)  Zithromax (Breathing trouble)   Medications:  (Selected)   Prescriptions  Prescribed  1/2 cc insulin syringes (uses 2 daily) and One touch test strips and lancets: 1/2 cc insulin syringes (uses 2 daily) and One touch test strips and lancets, See Instructions,  Instructions: uses 2 daily, Supply, # 100 EA, 5 Refill(s), Type: Maintenance, Pharmacy: [facility] #[id number], uses 2 daily  NovoLOG FlexPen 100 units/mL subcutaneous solution: ( 15 unit(s) ), subcutaneous, tidac, # 45 mL, 3 Refill(s), Type: Maintenance, Pharmacy: [facility] #[id number], keep on file and pt will notify when needed, 15 unit(s) subcutaneous tidac  ONETOUCH ULTRA BL   KITTY LIFE: See Instructions, Instructions: USE TO TEST BLOOD SUGAR 3 TIMES DAILY, # 100 unknown unit, 4 Refill(s), Type: Soft Stop, Pharmacy: [facility] #[id number], USE TO TEST BLOOD SUGAR 3 TIMES DAILY  Toujeo SoloStar 300 units/mL subcutaneous solution: See Instructions, Instructions: INJECT 35 UNITS SUBCUTANEOUSLY ONCE DAILY. MAY INCREASE 1 UNIT UNTIL SUGAR IS BELOW 130, # 6 mL, 5 Refill(s), Type: Maintenance, Pharmacy: [facility] #[id number], keep on file and pt will notify when needed, INJECT 35 UN...  amLODIPine 5 mg oral tablet: 1 tab(s), PO, Daily, # 90 tab(s), 3 Refill(s), Type: Maintenance, Pharmacy: [facility] #[id number], keep on file and pt will notify when needed, 1 tab(s) po daily  aspirin 325 mg oral tablet: 1 tab(s) ( 325 mg ), PO, Daily, # 30 tab(s), 0 Refill(s), Type: Maintenance, OTC (Rx)  atenolol-chlorthalidone 50 mg-25 mg oral tablet: 0.5 tab, PO, Daily, # 135 tab(s), 3 Refill(s), Type: Maintenance, Pharmacy: [facility] #[id number], keep on file and pt will notify when needed, 0.5 tab po daily  atorvastatin 40 mg oral tablet: 1 tab(s) ( 40 mg ), po, daily, # 90 tab(s), 3 Refill(s), Type: Maintenance, Pharmacy: [facility] #[id number], keep on file and pt will notify when needed, 1 tab(s) po daily  nitroglycerin 0.4 mg sublingual tablet: 1 tab(s), SL, q5min, PRN: for chest pain, # 25 tab(s), 1 Refill(s), Type: Maintenance, Pharmacy: [facility] #[id number], keep on hold and pt will notify when needed, 1 tab(s) sl q5 min,PRN:for chest pain  pantoprazole 40 mg oral delayed release tablet: 1 tab(s) ( 40 mg ), po, daily, # 90  tab(s), 3 Refill(s), Type: Maintenance, Pharmacy: [facility] #[id number], keep on file and pt will notify when needed, 1 tab(s) po daily  short insulin pen needles: short insulin pen needles, See Instructions, Instructions: insulin shots 4x/day, Supply, # 400 EA, 3 Refill(s), Type: Maintenance, Pharmacy: [facility] #[id number], insulin shots 4x/day   Problem list:    All Problems  Acute Low Back Pain / ICD-9-.2 / Confirmed  Arteriosclerotic Heart Disease (ASHD) / ICD-9-.00 / Confirmed  Background Diabetic Retinopathy / ICD-9-.50 / Confirmed  Diabetes Mellitus, Type 2 / ICD-9-.00 / Confirmed  GERD (Gastroesophageal Reflux Disease) / ICD-9-.81 / Confirmed  Glaucoma / ICD-9-.9 / Confirmed  Hypertension / SNOMED CT 9217768101 / Confirmed  Hyperlipemia / SNOMED CT 292956719 / Confirmed  Obesity / ICD-9-.00 / Probable  Inactive: Tobacco user / ICD-9-.1  Resolved: *Hospitalized@[facility] - Acute low back pain, L4 nerve root impingement  Canceled: Obese / ICD-9-.00      Histories   Past Medical History:    Active  Hypertension (9166396714)  Hyperlipemia (740179622)  Glaucoma (365.9)  Comments:  2012 CDT 1:46 PM CDT - Brooke Barraza  Right eye  Resolved  *Hospitalized@[facility] - Acute low back pain, L4 nerve root impingement: Onset on 2012 at 66 years.  Resolved.   Family History:    Suicide  Father ()  Kidney stone  Son (Elliot)  Diabetes mellitus type II  Mother ()  Asthma  Sister (Shira)  Heart attack  Mother ()  Son (Terrence)  CABG - Coronary artery bypass graft  Son (Terrence)  Coronary heart disease  Mother ()     Procedure history:    Esophagogastroduodenoscopy (558397709) on 2017 at 71 Years.  Comments:  2/10/2017 12:02 PM - Vinayak Lei MD  Gastric ulcer  Coronary angiogram (72090651) in the month of 2010 at 64 Years.  Comments:  2010 10:18 AM - Lew Huggins MD  no intervention with stents  OD cataract with IOL in the month  of 12/2006 at 61 Years.  glaucoma surgery OD in the month of 3/2006 at 60 Years.  coronary angioplasty with stents in 2000 at 55 Years.  Coronary angioplasty (15128369) in 1997 at 52 Years.  DEE - Total abdominal hysterectomy and bilateral salpingo-oophorectomy (3622966850) in 1975 at 30 Years.  Comments:  4/21/2010 1:59 PM - Hoda Fortune  benign fibroids  Cholecystectomy (84509377) in 1968 at 23 Years.  Appendectomy (970227281) in 1960 at 15 Years.  Tonsillectomy (806013536) in 1956 at 11 Years.  Dilatation and curettage (8222002328).  Comments:  4/21/2010 1:59 PM - Hoda Fortune  benign   Social History:        Alcohol Assessment: Denies Alcohol Use      Tobacco Assessment            Past                     Comments:                      09/25/2012 - Brooke Barraza                     Quit in 2000      Employment and Education Assessment            Retired, Work/School description: [employer].        Physical Examination   vital signs stable, as noted above   Vital Signs   2/15/2018 1:28 PM CST Temperature Tympanic 97 DegF  LOW    Peripheral Pulse Rate 60 bpm    Pulse Site Radial artery    HR Method Manual    Systolic Blood Pressure 138 mmHg  HI    Diastolic Blood Pressure 60 mmHg    Mean Arterial Pressure 86 mmHg    BP Site Right arm    BP Method Manual      Measurements from flowsheet : Measurements   2/15/2018 1:28 PM CST    Weight Measured - Standard                170.6 lb     General:  Alert and oriented, No acute distress.    Eye:  Pupils are equal, round and reactive to light, Extraocular movements are intact, Normal conjunctiva.    HENT:  Normocephalic, Oral mucosa is moist.    Neck:  Supple, No carotid bruit, No jugular venous distention, No lymphadenopathy.    Respiratory:  Lungs are clear to auscultation, Respirations are non-labored.    Cardiovascular:  Normal rate, Regular rhythm, No murmur, No edema.    Gastrointestinal:  Soft, Non-tender.    Musculoskeletal:  Normal strength, left with finger  trigger finger.    Neurologic:  Alert, Oriented, Normal motor function, No focal deficits, Cranial Nerves II-XII are grossly intact, normal monofilament testing.    Cognition and Speech:  Oriented, Speech clear and coherent, Functional cognition intact.    Psychiatric:  Appropriate mood & affect.       Review / Management   Results review:  Lab results   2/6/2018 10:06 AM CST Sodium Level 140 mmol/L    Potassium Level 4.9 mmol/L    Chloride Level 105 mmol/L    CO2 Level 27 mmol/L    Glucose Level 121 mg/dL  HI    BUN 32 mg/dL  HI    Creatinine 1.72 mg/dL  HI    BUN/Creat Ratio 19    eGFR 29 mL/min/1.73m2  LOW    eGFR African American 34 mL/min/1.73m2  LOW    Calcium Level 10.1 mg/dL    Hgb A1c 8.5  HI    Cholesterol 191 mg/dL    Non-    HDL 74 mg/dL    Chol/HDL Ratio 2.6    LDL 95    Triglyceride 127 mg/dL    U Microalbumin 4.1 mg/dL    Ur Creatinine 261 mg/dL    Ur Microalb/Creat Ratio 16   .       Impression and Plan   Diagnosis     Obesity (VZZ66-SZ E66.9).     Diabetes Mellitus, Type 2 (XLM17-ZL E11.9).     Background Diabetic Retinopathy (QBE18-TT E11.329).     Arteriosclerotic Heart Disease (ASHD) (NEK50-QF I25.10).     Hyperlipemia (UUH36-BA E78.2).         .) CAD   - Lexiscan (12/2016) - no reversible defects   - referral to cardiology (last saw Dr. Alberto in '11)   - previously intolerant to higher dose ISMN, she does have known atherostenotic disease involving LCx   - ASA, atenolol 25mg daily, atorvastatin 40mg QHS,    - lower threshold for PCI given historic intolerance to long-acting nitrates    .) GERD/esophagitis   - EGD in 12/2016 showing gastric ulcer - previous symptoms now resolved   - pantoprazole 40mg daily   - denies any NSAIDs    .) type II DM, uncontrolled  hypoglycemic medications: intolerant to metformin due to GI symptoms  insulin therapy: Toujeo 35 units, Humalog 12-15 units TID + SSI   - advised increasing Toujeo to 40 units; flowsheet for ICR 1:14, sensitivity 15 (~20 units q  meal)   - could consider future GLP1 agonist  last HbA1c: 8.5%   microvascular complications: retinopathy, mo microalbuminuria  macrovascular complications: CAD  ASA/GILDA/statin:  ASA 325mg daily, no microalbuminuira, atorvastatin 40mg    - previous myalgias on higher dose of statin    .) HTN, controlled  current antihypertensive regimen: atenolol/chlorthalidone 25/12.5mg, amlodipine 5mg,   regimen changes:  intolerance:  future titration/work-up plan: low threshold to change/add ACEI if development of microalbuminuria    .) Health maintenance   - declines any means of CRC screening   - normal DEXA 10/2013, arrange for repeat DEXA   - prrior DEE   - q2 yr mammography   - enrolled in CCM program    RTC in 6 months

## 2022-02-16 NOTE — TELEPHONE ENCOUNTER
Entered by Irais zAevedo CMA on August 17, 2021 6:20:39 AM CDT  ---------------------  From: Irais Azevedo CMA   To: [place] [address]    Sent: 8/17/2021 6:20:39 AM CDT  Subject: Medication Management     ** Not Approved: Patient has requested refill too soon, #90, 1 sent 8/16/21 **  pantoprazole (Pantoprazole Sodium 40 MG Oral Tablet Delayed Release)  Take 1 tablet by mouth once daily  Qty:  90 tab(s)        Days Supply:  90        Refills:  0          Substitutions Allowed     Route To Pharmacy - [place] [address]   Signed by Irais Azevedo CMA            ------------------------------------------  From: [place] [address]  To: Edgardo Luong MD  Sent: August 13, 2021 8:42:37 AM CDT  Subject: Medication Management  Due: August 12, 2021 12:21:25 AM CDT     ** On Hold Pending Signature **     Dispensed Drug: pantoprazole (pantoprazole 40 mg oral delayed release tablet), Take 1 tablet by mouth once daily  Quantity: 90 tab(s)  Days Supply: 90  Refills: 0  Substitutions Allowed  Notes from Pharmacy:  ------------------------------------------

## 2022-02-16 NOTE — TELEPHONE ENCOUNTER
Entered by Jayla Mcclain on July 26, 2019 3:39:19 PM CDT  ---------------------  From: Jayla Mcclain   To: [facility]    Sent: 7/26/2019 3:39:19 PM CDT  Subject: Medication Management     ** Submitted: **  Order:atorvastatin (atorvastatin 40 mg oral tablet)  1 tab(s)  Oral  daily  Qty:  90 tab(s)        Days Supply:  90        Refills:  1          Substitutions Allowed     Route To Pharmacy - [place] [address]    Signed by Jayla Mcclain  7/26/2019 3:39:00 PM    ** Submitted: **  Complete:atorvastatin (atorvastatin 40 mg oral tablet)   Signed by Jayla Mcclain  7/26/2019 3:39:00 PM    ** Not Approved:  **  atorvastatin (ATORVASTATIN 40MG   TAB)  TAKE 1 TABLET BY MOUTH ONCE DAILY  Qty:  90 tab(s)        Days Supply:  90        Refills:  0          Substitutions Allowed     Route To Pharmacy - [place] [address]   Signed by Jayla Mcclain          Med Refill      Date of last office visit and reason:  5/28/19 for chronic disease visit with [facility]      Date of last Med Check / Px:   5/28/19  Date of last labs pertaining to med:  Lipid: 1/15/19    RTC order in chart:  Yes, UTD through end of November    For Protocol refill, has patient been contacted:  will fill.       ** Patient matched by Jayla Mcclain on 7/26/2019 3:37:23 PM CDT **      ------------------------------------------  From: [facility]  To: Edgardo Luong MD  Sent: July 25, 2019 8:28:29 PM CDT  Subject: Medication Management  Due: July 26, 2019 8:28:29 PM CDT    ** On Hold Pending Signature **  Drug: atorvastatin (atorvastatin 40 mg oral tablet)  TAKE 1 TABLET BY MOUTH ONCE DAILY  Quantity: 90 tab(s)     Days Supply: 0         Refills: 0  Substitutions Allowed  Notes from Pharmacy:     Dispensed Drug: atorvastatin (atorvastatin 40 mg oral tablet)  TAKE 1 TABLET BY MOUTH ONCE DAILY  Quantity: 90 tab(s)     Days Supply: 90        Refills: 0  Substitutions Allowed  Notes from Pharmacy:    ------------------------------------------

## 2022-02-16 NOTE — TELEPHONE ENCOUNTER
---------------------  From: Paula Concepcion CMA ([employer] Message Pool ([id number]_[facility]))   To: Edgardo Luong MD;     Sent: 1/27/2021 4:23:37 PM CST  Subject: FW: General Message     Covid negative      ---------------------  From: Edgardo Luong MD   To: [employer] Message Pool ([id number] - [place]);     Sent: 1/27/2021 10:09:43 AM CST  Subject: General Message     Can wait for COVID NP swab results, but let Yoselyn know that radiologist though Xray looked suggestive of infectious changes that can be seen with COVID.---------------------  From: Edgardo Luong MD   To: [employer] Message Pool ([id number] [facility] [place]);     Sent: 1/27/2021 4:39:40 PM CST  Subject: RE: General Message     would provide same information.  Likely viral process.  Cannot rule out COVID.  Would monitor symptoms (especially encourage obtaining home pulse oximetry if feasible).  Would recommend self-isolation as discussed in clinic until current symptoms resolvept contacted at 1655 and discussed  talked about self-isolating and 72hrs after sx resolved  to be seen if worsening sx here or thru ER  she will have family check on pulse oximetry

## 2022-02-16 NOTE — LETTER
(Inserted Image. Unable to display)   May 23, 2019      ALEJANDRA HERRERA   [place], [place] [id number]                  Result Name Current Result Previous Result Reference Range   Hgb A1c ((H)) 8.5 5/15/2019 ((H)) 8.3 1/15/2019  - <5.7         Sincerely,        Edgardo Luong MD

## 2022-02-16 NOTE — TELEPHONE ENCOUNTER
Entered by Donna Quiñones on September 11, 2019 12:57:46 PM CDT  Date of last office visit and reason:  5/28/19 type 2 DM      Date of last Med Check / Px:   5/28/19  Date of last labs pertaining to med:  _    RTC order in chart:  Placed 8/7/19. Patient due for non fasting labs     For Protocol refill, has patient been contacted:  Yes. Reminder letter sent out.             ------------------------------------------  From: [facility]  To: Edgardo Luong MD  Sent: September 11, 2019 12:33:41 PM CDT  Subject: Medication Management  Due: September 12, 2019 12:33:41 PM CDT    ** On Hold Pending Signature **  Drug: amLODIPine (amLODIPine 5 mg oral tablet)  1 tab(s) Oral daily  Quantity: 90 tab(s)     Days Supply: 0         Refills: 0  Substitutions Allowed  Notes from Pharmacy:     Dispensed Drug: amLODIPine (amLODIPine 5 mg oral tablet)  TAKE 1 TABLET BY MOUTH ONCE DAILY  Quantity: 90 tab(s)     Days Supply: 90        Refills: 0  Substitutions Allowed  Notes from Pharmacy:   ---------------------------------------------------------------  From: Donna Quiñones   To: [place] [id number]    Sent: 9/11/2019 12:58:08 PM CDT  Subject: Medication Management     ** Submitted: **  Order:amLODIPine (amLODIPine 5 mg oral tablet)  1 tab(s)  Oral  daily  Qty:  90 tab(s)        Refills:  0          Substitutions Allowed     Route To Pharmacy - [place] [address]    Signed by Donna Quiñones  9/11/2019 12:57:00 PM    ** Submitted: **  Complete:amLODIPine (amLODIPine 5 mg oral tablet)   Signed by Donna Quiñones  9/11/2019 12:58:00 PM    ** Not Approved:  **  amLODIPine (AMLODIPINE 5MG TAB)  TAKE 1 TABLET BY MOUTH ONCE DAILY  Qty:  90 tab(s)        Days Supply:  90        Refills:  0          Substitutions Allowed     Route To Pharmacy - [id number]   Signed by Milvia/Donna GARCIA

## 2022-02-16 NOTE — NURSING NOTE
Comprehensive Intake Entered On:  8/11/2021 12:58 PM CDT    Performed On:  8/11/2021 12:53 PM CDT by Donna Quiñones               Summary   Chief Complaint :   Patient here today for CDM and lab f/u. Patient alos c/o plugged R ear.    Weight Measured :   167.3 lb(Converted to: 167 lb 5 oz, 75.886 kg)    Systolic Blood Pressure :   174 mmHg (HI)    Diastolic Blood Pressure :   76 mmHg   Mean Arterial Pressure :   109 mmHg   Peripheral Pulse Rate :   64 bpm   BP Site :   Right arm   BP Method :   Electronic   HR Method :   Electronic   Temperature Tympanic :   97.5 DegF(Converted to: 36.4 DegC)  (LOW)    Race :      Languages :   English   Donna Quiñones - 8/11/2021 12:53 PM CDT   Health Status   Allergies Verified? :   Yes   Medication History Verified? :   Yes   Medical History Verified? :   Yes   Pre-Visit Planning Status :   Completed   Tobacco Use? :   Former smoker   Donna Quiñones - 8/11/2021 12:53 PM CDT   Consents   Consent for Immunization Exchange :   Consent Granted   Consent for Immunizations to Providers :   Consent Granted   Donna Quiñones - 8/11/2021 12:53 PM CDT   Meds / Allergies   (As Of: 8/11/2021 12:58:45 PM CDT)   Allergies (Active)   Actos  Estimated Onset Date:   Unspecified ; Reactions:   GI upset,edema ; Created By:   Paula Concepcion; Reaction Status:   Active ; Substance:   Actos ; Updated By:   Paula Concepcion; Reviewed Date:   8/11/2021 12:57 PM CDT      Avalide  Estimated Onset Date:   Unspecified ; Reactions:   angioedema ; Created By:   Paula Concepcion; Reaction Status:   Active ; Substance:   Avalide ; Updated By:   Paula Concepcion; Reviewed Date:   8/11/2021 12:57 PM CDT      Avandia  Estimated Onset Date:   Unspecified ; Reactions:   breathing problem ; Created By:   Paula Concepcion; Reaction Status:   Active ; Substance:   Avandia ; Updated By:   Paula Concepcion; Reviewed Date:   8/11/2021 12:57 PM CDT      Avelox  Estimated Onset Date:   Unspecified ; Reactions:    shaky ; Created By:   Paula Concepcion; Reaction Status:   Active ; Substance:   Avelox ; Updated By:   Paula Concepcion; Reviewed Date:   8/11/2021 12:57 PM CDT      Benadryl  Estimated Onset Date:   Unspecified ; Reactions:   hives ; Created By:   Paula Concepcion; Reaction Status:   Active ; Substance:   Benadryl ; Updated By:   Paula Concepcion; Reviewed Date:   8/11/2021 12:57 PM CDT      Glucophage  Estimated Onset Date:   Unspecified ; Reactions:   GI sx ; Created By:   Paula Concepcion; Reaction Status:   Active ; Substance:   Glucophage ; Updated By:   Paula Concepcion; Reviewed Date:   8/11/2021 12:57 PM CDT      hydrochlorothiazide-irbesartan  Estimated Onset Date:   2/5/2017 ; Reactions:   Angioedema ; Created By:   Paula Concepcion CMA; Reaction Status:   Active ; Category:   Drug ; Substance:   hydrochlorothiazide-irbesartan ; Type:   <not entered> ; Severity:   Unknown ; Updated By:   Paula Concepcion CMA; Reviewed Date:   8/11/2021 12:57 PM CDT      ibuprofen  Estimated Onset Date:   <not entered> 9/20/2008 ; Created By:   Paula Concepcion CMA; Reaction Status:   Active ; Category:   Drug ; Substance:   ibuprofen ; Type:   <not entered> ; Severity:   Unknown ; Updated By:   Paula Concepcion CMA; Reviewed Date:   8/11/2021 12:57 PM CDT      lisinopril  Estimated Onset Date:   Unspecified ; Reactions:   angioedema ; Created By:   Paula Concepcion; Reaction Status:   Active ; Substance:   lisinopril ; Updated By:   Paula Concepcion; Reviewed Date:   8/11/2021 12:57 PM CDT      Naprosyn  Estimated Onset Date:   Unspecified ; Reactions:   hives ; Created By:   Paula Concepcion; Reaction Status:   Active ; Substance:   Naprosyn ; Updated By:   Paula Concepcion; Reviewed Date:   8/11/2021 12:57 PM CDT      Peanuts  Estimated Onset Date:   <not entered> 9/8/2014 ; Reactions:   Itching ; Created By:   Paula Concepcion CMA; Reaction Status:   Active ; Category:   <not entered> ; Substance:   Peanuts ; Type:   <not entered> ; Severity:    Unknown ; Updated By:   Paula Concepcion CMA; Reviewed Date:   8/11/2021 12:57 PM CDT      penicillin  Estimated Onset Date:   Unspecified ; Reactions:   hives ; Created By:   Paula Concepcion; Reaction Status:   Active ; Substance:   penicillin ; Updated By:   Paula Concepcion; Reviewed Date:   8/11/2021 12:57 PM CDT      predniSONE  Estimated Onset Date:   Unspecified ; Reactions:   hives ; Created By:   Paula Concepcion; Reaction Status:   Active ; Substance:   predniSONE ; Updated By:   Paula Concepcion; Reviewed Date:   8/11/2021 12:57 PM CDT      sulfa drug  Estimated Onset Date:   Unspecified ; Reactions:   hives, nausea and vomitting ; Created By:   Paula Concepcion CMA; Reaction Status:   Active ; Substance:   sulfa drug ; Updated By:   Paula Concepcion CMA; Reviewed Date:   8/11/2021 12:57 PM CDT      Tylenol  Estimated Onset Date:   Unspecified ; Reactions:   feet swelling ; Created By:   Paula Concepcion; Reaction Status:   Active ; Category:   Drug ; Substance:   Tylenol ; Type:   Allergy ; Updated By:   Paula Concepcion; Reviewed Date:   8/11/2021 12:57 PM CDT      Zithromax  Estimated Onset Date:   Unspecified ; Reactions:   breathing trouble ; Created By:   Paula Concepcion; Reaction Status:   Active ; Substance:   Zithromax ; Updated By:   Paula Concepcion; Reviewed Date:   8/11/2021 12:57 PM CDT        Medication List   (As Of: 8/11/2021 12:58:45 PM CDT)   Prescription/Discharge Order    traMADol 50 mg oral tablet  :   traMADol 50 mg oral tablet ; Status:   Prescribed ; Ordered As Mnemonic:   traMADol 50 mg oral tablet ; Simple Display Line:   See Instructions, TAKE 1 TABLET BY MOUTH EVERY 4 HOURS AS NEEDED FOR PAIN, 30 tab(s) ; Ordering Provider:   Edgardo Luong MD; Catalog Code:   traMADol ; Order Dt/Tm:   10/22/2019 2:41:20 PM CDT          pantoprazole  :   pantoprazole ; Status:   Prescribed ; Ordered As Mnemonic:   pantoprazole 40 mg oral delayed release tablet ; Simple Display Line:   40 mg, 1 tab(s), po, daily, 90  tab(s), 1 Refill(s) ; Ordering Provider:   Edgardo Luong MD; Catalog Code:   pantoprazole ; Order Dt/Tm:   2/19/2021 4:00:45 PM CST          nitroglycerin  :   nitroglycerin ; Status:   Prescribed ; Ordered As Mnemonic:   nitroglycerin 0.4 mg sublingual tablet ; Simple Display Line:   1 tab(s), SL, q5min, PRN: for chest pain, 25 tab(s), 1 Refill(s) ; Ordering Provider:   Edgardo Luong MD; Catalog Code:   nitroglycerin ; Order Dt/Tm:   2/21/2020 9:13:40 AM CST          insulin glargine  :   insulin glargine ; Status:   Prescribed ; Ordered As Mnemonic:   Toujeo Max SoloStar 300 units/mL subcutaneous solution ; Simple Display Line:   32 units, Subcutaneous, daily, 45 mL, 1 Refill(s) ; Ordering Provider:   Edgardo Luong MD; Catalog Code:   insulin glargine ; Order Dt/Tm:   2/19/2021 4:00:46 PM CST          insulin aspart  :   insulin aspart ; Status:   Prescribed ; Ordered As Mnemonic:   NovoLOG FlexPen 100 units/mL injectable solution ; Simple Display Line:   See Instructions, 12-15 units Subcutaneous tidac, 60 mL, 1 Refill(s) ; Ordering Provider:   Edgardo Luong MD; Catalog Code:   insulin aspart ; Order Dt/Tm:   2/19/2021 4:00:42 PM CST          atorvastatin  :   atorvastatin ; Status:   Prescribed ; Ordered As Mnemonic:   atorvastatin 40 mg oral tablet ; Simple Display Line:   1 tab(s), Oral, daily, 90 tab(s), 1 Refill(s) ; Ordering Provider:   Edgardo Luong MD; Catalog Code:   atorvastatin ; Order Dt/Tm:   2/19/2021 4:00:45 PM CST          atenolol-chlorthalidone  :   atenolol-chlorthalidone ; Status:   Prescribed ; Ordered As Mnemonic:   atenolol-chlorthalidone 50 mg-25 mg oral tablet ; Simple Display Line:   0.5 tab(s), Oral, daily, 45 tab(s), 1 Refill(s) ; Ordering Provider:   Edgardo Luong MD; Catalog Code:   atenolol-chlorthalidone ; Order Dt/Tm:   2/19/2021 4:00:44 PM CST          aspirin  :   aspirin ; Status:   Prescribed ; Ordered As Mnemonic:   aspirin 325 mg oral tablet ; Simple Display Line:   325 mg, 1 tab(s),  PO, Daily, 30 tab(s) ; Ordering Provider:   Lew Huggins MD; Catalog Code:   aspirin ; Order Dt/Tm:   3/2/2012 12:07:13 PM CST          amLODIPine  :   amLODIPine ; Status:   Prescribed ; Ordered As Mnemonic:   amLODIPine 5 mg oral tablet ; Simple Display Line:   1 tab(s), Oral, daily, 90 tab(s), 1 Refill(s) ; Ordering Provider:   Edgardo Luong MD; Catalog Code:   amLODIPine ; Order Dt/Tm:   2/19/2021 4:00:43 PM CST          albuterol  :   albuterol ; Status:   Prescribed ; Ordered As Mnemonic:   Ventolin HFA 90 mcg/inh inhalation aerosol ; Simple Display Line:   See Instructions, 1-2 puff(s) Inhale q2-4hr, PRN: as needed for wheezing, 1 EA, 1 Refill(s) ; Ordering Provider:   Edgardo uLong MD; Catalog Code:   albuterol ; Order Dt/Tm:   2/20/2020 1:16:54 PM CST

## 2022-02-16 NOTE — TELEPHONE ENCOUNTER
Entered by Paula Concepcion CMA on October 15, 2021 1:49:02 PM CDT  ---------------------  From: Paula Concepcion CMA   To: [place] [id number]    Sent: 10/15/2021 1:49:02 PM CDT  Subject: FW: Medication Management     ** Not Approved: filled 8/16/21 #90 with 1RF **  atorvastatin (Atorvastatin Calcium 40 MG Oral Tablet)  Take 1 tablet by mouth once daily  Qty:  90 tab(s)        Days Supply:  90        Refills:  0          Substitutions Allowed     Route To Pharmacy [facility]   Signed by Paula Concepcion CMA            ** Not Approved: filled 8/16/2021 #90 with 1 RF **  amLODIPine (amLODIPine Besylate 5 MG Oral Tablet)  Take 1 tablet by mouth once daily  Qty:  90 tab(s)        Days Supply:  90        Refills:  0          Substitutions Allowed     Route To [facility]   Signed by Paula Concepcion CMA            ---------------------  From: Paula Concepcion CMA (eRx Pool ([id number]))   To: [employer] ([id number]_[place] [facility] [place]);     Sent: 10/15/2021 12:34:18 PM CDT  Subject: FW: Medication Management   Due Date/Time: 10/15/2021 10:26:00 AM CDT           ------------------------------------------  From: [place] [id number]  To: Edgardo Luong MD  Sent: October 13, 2021 10:26:53 AM CDT  Subject: Medication Management  Due: October 2, 2021 3:22:27 PM CDT     ** On Hold Pending Signature **     Dispensed Drug: atorvastatin (atorvastatin 40 mg oral tablet), Take 1 tablet by mouth once daily  Quantity: 90 tab(s)  Days Supply: 90  Refills: 0  Substitutions Allowed  Notes from Pharmacy:     ** On Hold Pending Signature **     Dispensed Drug: amLODIPine (amLODIPine 5 mg oral tablet), Take 1 tablet by mouth once daily  Quantity: 90 tab(s)  Days Supply: 90  Refills: 0  Substitutions Allowed  Notes from Pharmacy:  ------------------------------------------

## 2022-02-16 NOTE — PROGRESS NOTES
Patient:   ALEJANDRA HERRERA            MRN: [id number]            FIN: [id number]               Age:   71 years     Sex:  Female     :  1945   Associated Diagnoses:   Chronic GERD   Author:   Vinayak Lei MD      Visit Information      Date of Service: 2017 02:02 pm  Performing Location: [facility]  Encounter#: [id number]      Primary Care Provider (PCP):  Edgardo Luong MD    NPI# [id number]      Referring Provider:  No referring provider recorded for selected visit.      Chief Complaint   2017 2:07 PM CST     Consult per [facility]. History of chronic reflux x 1 year.      History of Present Illness   Had a normal nuclear stress test.  Gets dull pain after eating and certain foods. Denies exertional pain. Symptoms present over a year. Has dysphagia once a month that goes down with water quickly.  Denies weight loss. Has early satiety.  Has taken omeprazole for 5 years with benefit. Increasing to twice a day did not give benefit.  Some days has diarrehea and other days constipated since cholecystectomy.      Review of Systems   Constitutional:  No fever.    Respiratory:  No shortness of breath.    Cardiovascular:  No chest pain.    Gastrointestinal:  No vomiting.    Hematology/Lymphatics:  No bruising tendency, No bleeding tendency.      No history of bleeding disorders or blood transfusion  No prior problems with anesthesia  No family history of anesthesia problems  No positive responses for sleep apnea  Denies plavix, coumadin  Denies history of DVT/PE  Denies recent corticosteroid use    Able to walk a flight of stairs without chest pain or shortness of breath.      Health Status   Allergies:    Allergic Reactions (Selected)  Severity Not Documented  Actos (Gi upset,edema)  Avalide (Angioedema)  Avandia (Breathing problem)  Avelox (Shaky)  Benadryl (Hives)  Glucophage (Gi sx)  Lisinopril (Angioedema)  Naprosyn (Hives)  Penicillin (Hives)  PredniSONE (Hives)  Sulfa drug (Hives)  Tylenol (Feet  swelling)  Zithromax (Breathing trouble)   Medications:  (Selected)   Prescriptions  Prescribed  1/2 cc insulin syringes (uses 2 daily) and One touch test strips and lancets: 1/2 cc insulin syringes (uses 2 daily) and One touch test strips and lancets, See Instructions, Instructions: uses 2 daily, Supply, # 100 EA, 5 Refill(s), Type: Maintenance, Pharmacy: [id number], uses 2 daily  HumaLOG KwikPen 100 units/mL subcutaneous solution: See Instructions, Instructions: USE 17 UNITS SUBCUTANEOUSLY 15 MIN. [id number] MAY INCREASE, # 45 mL, 3 Refill(s), Pharmacy: [facility], USE 17 UNITS SUBCUTANEOUSLY 15 MIN. [id number] MAY INCREASE  Toujeo SoloStar 300 units/mL subcutaneous solution: ( 35 unit(s) ), subcutaneous, daily, Instructions: Can increase by 1 unit until AM BS less than 130 if needed. This will replace Lantus, # 36 mL, 3 Refill(s), Type: Maintenance, Pharmacy: [facility], keep on hold and pt will notify when nee...  amLODIPine 5 mg oral tablet: 1 tab(s), PO, Daily, # 90 tab(s), 3 Refill(s), Type: Maintenance, Pharmacy: [facility], 1 tab(s) po daily  aspirin 325 mg oral tablet: 1 tab(s) ( 325 mg ), PO, Daily, # 30 tab(s), 0 Refill(s), Type: Maintenance, OTC (Rx)  atenolol-chlorthalidone 50 mg-25 mg oral tablet: 0.5 tab, PO, Daily, # 135 tab(s), 3 Refill(s), Type: Maintenance, Pharmacy: [facility], 0.5 tab po daily  atorvastatin 40 mg oral tablet: 1 tab(s) ( 40 mg ), po, daily, # 90 tab(s), 3 Refill(s), Type: Maintenance, Pharmacy: [facility], 1 tab(s) po daily  fluconazole 150 mg oral tablet: 1 tab(s) ( 150 mg ), PO, q48 hrs, # 2 tab(s), 1 Refill(s), Type: Soft Stop, Pharmacy: [facility] #[id number], 1 tab(s) po q48 hrs  nitroglycerin 0.4 mg sublingual tablet: 1 tab(s), SL, q5min, PRN: for chest pain, # 25 tab(s), 1 Refill(s), Type: Maintenance, Pharmacy: [id number], keep on hold and pt will notify when needed, 1 tab(s) sl q5 min,PRN:for chest  pain  omeprazole 20 mg oral delayed release tablet: 1 tab(s) ( 20 mg ), PO, bid, # 90 tab(s), 3 Refill(s), Type: Maintenance, Pharmacy: [id number], keep on hold and pt will notify when needed, 1 tab(s) po bid  short insulin pen needles: short insulin pen needles, See Instructions, Instructions: insulin shots 4x/day, Supply, # 400 EA, 3 Refill(s), Type: Maintenance, Pharmacy: [id number], insulin shots 4x/day  Documented Medications  Documented  HumaLOG KwikPen: subcutaneous, Instructions: Sliding Scale: <150-0 units, 151-200-3 units, 201-250-6 units, 251-300-9 units, 301-350-12 units, 0 Refill(s), Type: Maintenance   Problem list:    All Problems  Acute Low Back Pain / ICD-9-.2 / Confirmed  Arteriosclerotic Heart Disease (ASHD) / ICD-9-.00 / Confirmed  Background Diabetic Retinopathy / ICD-9-.50 / Confirmed  Diabetes Mellitus, Type 2 / ICD-9-.00 / Confirmed  GERD (Gastroesophageal Reflux Disease) / ICD-9-.81 / Confirmed  Glaucoma / ICD-9-.9 / Confirmed  Hypertension / SNOMED CT 6010174170 / Confirmed  Hyperlipemia / SNOMED CT 792392873 / Confirmed  Obesity / ICD-9-.00 / Probable  Inactive: Tobacco user / ICD-9-.1  Resolved: *Hospitalized@[facility] - Acute low back pain, L4 nerve root impingement  Canceled: Obese / ICD-9-.00      Histories   Family History: No family history esophageal or stomach cancer   Procedure history:    Coronary angiogram (SNOMED CT 63824901) in the month of 4/2010 at 64 Years.  Comments:  7/2/2010 10:18 AM - Lew Huggins MD  no intervention with stents  OD cataract with IOL in the month of 12/2006 at 61 Years.  glaucoma surgery OD in the month of 3/2006 at 60 Years.  coronary angioplasty with stents in 2000 at 55 Years.  Coronary angioplasty (SNOMED CT 51869156) in 1997 at 52 Years.  DEE - Total abdominal hysterectomy and bilateral salpingo-oophorectomy (SNOMED CT 1251960145) in 1975 at 30 Years.  Comments:  4/21/2010  1:59 PM - Hoda Fortune  benign fibroids  Cholecystectomy (SNOMED CT 74526446) in 1968 at 23 Years.  Appendectomy (SNOMED CT 522198544) in 1960 at 15 Years.  Tonsillectomy (SNOMED CT 701043371) in 1956 at 11 Years.  Dilatation and curettage (SNOMED CT 0114465635).  Comments:  4/21/2010 1:59 PM - Hoda Fortune  benign   Social History:  and nonsmoker      Physical Examination   Vital Signs   1/9/2017 2:07 PM CST Temperature Tympanic 97.9 DegF    Peripheral Pulse Rate 65 bpm    Systolic Blood Pressure 139 mmHg    Diastolic Blood Pressure 71 mmHg      General:  Alert and oriented, No acute distress.    HENT:  No pharyngeal erythema.    Respiratory:  Lungs are clear to auscultation.    Cardiovascular:  Normal rate, Regular rhythm.    Gastrointestinal:  Soft, Non-tender, Non-distended.    Musculoskeletal:  Normal range of motion, Normal strength, Normal gait.    Neurologic:  No focal deficits.    Psychiatric:  Appropriate mood & affect.    Upper and lower dentures      Review / Management   Cardiologist felt nuclear stress test with old lesions and no new active blockage      Impression and Plan   Diagnosis     Chronic GERD ([id number] K21.9).       Risks and benefits of EGD discussed. Consider biopsies for EE, celiac and raudel test. Consider starting cholestyramine  Reviewed medication allergies    Addendum 1/23:  Esophagram:  CONCLUSION:   1.  Severe spontaneous gastroesophageal reflux to the upper esophagus.  2.  Mild esophageal dysmotility with stasis of contrast and intraesophageal reflux in the recumbent position.

## 2022-02-16 NOTE — PROGRESS NOTES
Patient:   ALEJANDRA HERRERA            MRN: [id number]            FIN: [id number]               Age:   74 years     Sex:  Female     :  1945   Associated Diagnoses:   Obesity; Diabetes Mellitus, Type 2; Background Diabetic Retinopathy; Arteriosclerotic Heart Disease (ASHD); Hyperlipemia   Author:   Edgardo Luong MD      Visit Information      Date of Service: 2019 03:32 pm  Performing Location: [facility]  Encounter#: [id number]      Primary Care Provider (PCP):  Edgardo Luong MD# [id number]      Referring Provider:  Edgardo Luong MD# [id number]      Chief Complaint   2019 3:40 PM CDT    f/u chronic disease            Additional Information:No additional information recorded during visit.   Chief complaint and symptoms as noted above and confirmed with patient      History of Present Illness   2014: Alejandra presents to clinic to establish care, previously followed by JEB.  She has a history of type 2 diabetes historically with uncontrolled hyperglycemia with hemoglobin A1c consistently above 9%, until approximately 1-1/2 years ago she was transitioned to Lantus basal and Lispro bolus insulin dosing with significant improvement in her glycemic control. Currently taking Lantus 40 units nightly, Humalog 15 units 3 times a day before meals.  She has a history of retinopathy, no known peripheral neuropathy or nephropathy.  She has remote history of atherosclerotic coronary artery disease, undergoing angioplasty in the mid 90s.  Denies any anginal equivalent symptoms.  She has never undergone screening for colorectal cancer. Labs reviewed with her.     2019:  Presents for general diabetic follow-up.  Overall doing well.  No specific complaints or concerns.  Tolerating insulin therapy without issues.  Denies any regular glycemia.    2019: Alejandra presents for regular follow-up.  Overall doing well.  She has been following her provided flowsheet for prandial insulin dosing.   Currently following insulin to carbohydrate ratio of 1-4 and sensitivity of 15.  Feeling more comfortable with carb counting.  Denies any recurrent hypoglycemia.  Feeling well otherwise.  Denies any chest pain or shortness of breath.  Working with a chiropractor regularly related to chronic hip and low back pain    9/18/2019: Yoselyn presents to clinic for diabetic follow-up.  Overall doing well.  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  She is doing fewer amounts of chores which she feels like is helping with her back and her body aches.  Concerns related to her cumulative cost and insulin.  Otherwise tolerating medications without issues.  Feels like she has been doing well overall         Review of Systems   Constitutional:  No fever, No chills.    Eye:  Negative except as documented in history of present illness.    Ear/Nose/Mouth/Throat:  Negative except as documented in history of present illness.    Respiratory:  Shortness of breath.    Cardiovascular:  No chest pain, No palpitations, No peripheral edema, No syncope.    Gastrointestinal:  No vomiting, No constipation, No heartburn, No abdominal pain.    Genitourinary:  No dysuria, No hematuria.    Hematology/Lymphatics:  Negative except as documented in history of present illness.    Endocrine:  No excessive thirst, No polyuria.    Immunologic:  No recurrent fevers.    Musculoskeletal:  Back pain, Joint pain, Decreased range of motion, No neck pain, No muscle pain.    Neurologic:  Alert and oriented X4, No numbness, No tingling.       Health Status   Allergies:    Allergic Reactions (Selected)  Severity Not Documented  Actos (Gi upset,edema)  Avalide (Angioedema)  Avandia (Breathing problem)  Avelox (Shaky)  Benadryl (Hives)  Glucophage (Gi sx)  Lisinopril (Angioedema)  Naprosyn (Hives)  Penicillin (Hives)  PredniSONE (Hives)  Sulfa drug (Hives and nausea and vomitting)  Tylenol (Feet swelling)  Zithromax  (Breathing trouble)  Nonallergic Reactions (Selected)  Unknown  Hydrochlorothiazide-irbesartan (Angioedema)  Ibuprofen (No reactions were documented)  Peanuts (Itching)   Medications:  (Selected)   Prescriptions  Prescribed  1/2 cc insulin syringes (uses 2 daily) and One touch test strips and lancets: 1/2 cc insulin syringes (uses 2 daily) and One touch test strips and lancets, See Instructions, Instructions: uses 2 daily, Supply, # 100 EA, 5 Refill(s), Type: Maintenance, Pharmacy: [id number], uses 2 daily  NovoLOG FlexPen 100 units/mL injectable solution: See Instructions, Instructions: INJECT 15 UNITS SUBCUTANEOUSLY THREE TIMES DAILY BEFORE MEALS, # 45 mL, 3 Refill(s), Type: Soft Stop, Pharmacy: [facility], keep on file, INJECT 15 UNITS SUBCUTANEOUSLY THREE TIMES DAILY BEFORE MEALS  ONE TOUCH ULTRA TEST STRIPS: ONE TOUCH ULTRA TEST STRIPS, See Instructions, Instructions: TESTING qid - E11.9, Supply, # 400 EA, 3 Refill(s), Type: Maintenance, Pharmacy: [facility], keep on file and pt will notify when needed, TESTING qid - E11.9  One Touch Ultra Blood glucose meter: One Touch Ultra Blood glucose meter, See Instructions, Instructions: use as directed - E11.9, Supply, # 1 EA, 0 Refill(s), Type: Maintenance, Pharmacy: [place] [id number], use as directed - E11.9  Toujeo SoloStar 300 units/mL subcutaneous solution: See Instructions, Instructions: INJECT 35 UNITS SUBCUTANEOUSLY ONCE DAILY. MAY INCREASE 1 UNIT UNTIL SUGAR IS BELOW 130, # 46 mL, 3 Refill(s), Type: Soft Stop, Pharmacy: [place] [address], INJECT 35 UNITS SUBCUTANEOUSLY ONCE DAILY. MAY INCREASE 1...  amLODIPine 5 mg oral tablet: = 1 tab(s), Oral, daily, # 90 tab(s), 0 Refill(s), Type: Maintenance, [facility] [place] [address]  aspirin 325 mg oral tablet: 1 tab(s) ( 325 mg ), PO, Daily, # 30 tab(s), 0 Refill(s), Type: Maintenance, OTC (Rx)  atenolol-chlorthalidone 50 mg-25 mg oral tablet: 0.5 tab(s), Oral, daily, # 45  tab(s), 1 Refill(s), Type: Maintenance, Pharmacy: [facility]  atorvastatin 40 mg oral tablet: = 1 tab(s), Oral, daily, # 90 tab(s), 1 Refill(s), Type: Maintenance, Pharmacy: [facility]  nitroglycerin 0.4 mg sublingual tablet: = 1 tab(s), SL, q5min, PRN: for chest pain, # 25 tab(s), 1 Refill(s), Type: Maintenance, Pharmacy: [facility] #[id number], keep on hold and pt will notify when needed, 1 tab(s) SL q5 min,PRN:for chest pain  pantoprazole 40 mg oral delayed release tablet: = 1 tab(s) ( 40 mg ), po, daily, # 90 tab(s), 3 Refill(s), Type: Maintenance, Pharmacy: [facility] #[id number], keep on file and pt will notify when needed, 1 tab(s) Oral daily  short insulin pen needles: short insulin pen needles, See Instructions, Instructions: insulin shots 4x/day, Supply, # 400 EA, 3 Refill(s), Type: Maintenance, Pharmacy: [facility] #[id number], keep on hold and pt will notify when needed, insulin shots 4x/day  traMADol 50 mg oral tablet: = 1 tab(s) ( 50 mg ), PO, q4hr, PRN: for pain, # 30 tab(s), 0 Refill(s), Type: Maintenance, called to pharmacy (Rx),    Medications          *denotes recorded medication          short insulin pen needles: See Instructions, insulin shots 4x/day, 400 EA, 3 Refill(s).          ONE TOUCH ULTRA TEST STRIPS: See Instructions, TESTING qid - E11.9, 400 EA, 3 Refill(s).          1/2 cc insulin syringes (uses 2 daily) and One touch test strips and lancets: See Instructions, uses 2 daily, 100 EA.          One Touch Ultra Blood glucose meter: See Instructions, use as directed - E11.9, 1 EA, 0 Refill(s).          amLODIPine 5 mg oral tablet: 1 tab(s), Oral, daily, 90 tab(s), 0 Refill(s).          aspirin 325 mg oral tablet: 325 mg, 1 tab(s), PO, Daily, 30 tab(s).          atenolol-chlorthalidone 50 mg-25 mg oral tablet: 0.5 tab(s), Oral, daily, 45 tab(s), 1 Refill(s).          atorvastatin 40 mg oral tablet: 1 tab(s), Oral, daily, 90 tab(s), 1 Refill(s).          NovoLOG FlexPen  100 units/mL injectable solution: See Instructions, INJECT 15 UNITS SUBCUTANEOUSLY THREE TIMES DAILY BEFORE MEALS, 45 mL, 3 Refill(s).          Toujeo SoloStar 300 units/mL subcutaneous solution: See Instructions, INJECT 35 UNITS SUBCUTANEOUSLY ONCE DAILY. MAY INCREASE 1 UNIT UNTIL SUGAR IS BELOW 130, 46 mL, 3 Refill(s).          nitroglycerin 0.4 mg sublingual tablet: 1 tab(s), SL, q5min, PRN: for chest pain, 25 tab(s), 1 Refill(s).          pantoprazole 40 mg oral delayed release tablet: 40 mg, 1 tab(s), po, daily, 90 tab(s), 3 Refill(s).          traMADol 50 mg oral tablet: 50 mg, 1 tab(s), PO, q4hr, for 7 day(s), PRN: for pain, 30 tab(s), 0 Refill(s).       Problem list:    All Problems  Acute low back pain / SNOMED CT 361947552 / Confirmed  Arteriosclerotic heart disease (ASHD) / SNOMED CT 34678949 / Confirmed  GERD (gastroesophageal reflux disease) / SNOMED CT 447119922 / Confirmed  Glaucoma / SNOMED CT 76969093 / Confirmed  Hypertension / SNOMED CT 9902506720 / Confirmed  Insulin long-term use / SNOMED CT 0016293139 / Confirmed  Hyperlipemia / SNOMED CT 320523618 / Confirmed  Background diabetic retinopathy / SNOMED CT 7569174530 / Confirmed  Obesity / SNOMED CT 5369114955 / Probable  Diabetes mellitus, type 2 / SNOMED CT 200487922 / Confirmed  Inactive: Tobacco user / ICD-9-.1  Resolved: *Hospitalized@[facility] - Acute low back pain, L4 nerve root impingement  Canceled: Long term current use of oral hypoglycemic drug / SNOMED CT 094638738  Canceled: Obese / ICD-9-.00      Histories   Past Medical History:    Active  Diabetes mellitus, type 2 (767019591): Onset in 1996 at 51 years.  Comments:  1/29/2010 CST 3:00 PM Paula Braun CMA  started Insulin 2/2002  Background diabetic retinopathy (7680079202)  Arteriosclerotic heart disease (ASHD) (32836307)  Comments:  1/29/2010 CST 3:02 PM Paula Braun CMA  Coronary Angioplasty and Stents x 2    11/1/2010 CDT 2:14 PM CDT - Joseluis STINSON,  Lew  &   GERD (gastroesophageal reflux disease) (013654086)  Hypertension (3319609342)  Hyperlipemia (038027556)  Acute low back pain (301378838)  Comments:  2012 CDT 10:46 AM CDT - Lew Huggins MD  L3-L4 disc with L4 nerve root impingement  Obesity (1336074118)  Glaucoma (51435973)  Comments:  2012 CDT 1:46 PM CDT - Brooke Barraza  Right eye  Resolved  *[facility]ospitalized@[facility] - Acute low back pain, L4 nerve root impingement: Onset on 2012 at 66 years.  Resolved.   Family History:    Suicide  Father ()  Kidney stone  Son (Elliot)  Diabetes mellitus type II  Mother ()  Asthma  Sister (Shira)  Heart attack  Mother ()  Son (Terrence)  CABG - Coronary artery bypass graft  Son (Terrence)  Coronary heart disease  Mother ()     Procedure history:    Esophagogastroduodenoscopy (326943748) on 2017 at 71 Years.  Comments:  2/10/2017 12:02 PM CST - Vinayak Lei MD  Gastric ulcer  Coronary angiogram (58373472) in the month of 2010 at 64 Years.  Comments:  2010 10:18 AM CDT - Lew Huggins MD  no intervention with stents  OD cataract with IOL in the month of 2006 at 61 Years.  glaucoma surgery OD in the month of 3/2006 at 60 Years.  coronary angioplasty with stents in  at 55 Years.  Coronary angioplasty (59385401) in  at 52 Years.  DEE - Total abdominal hysterectomy and bilateral salpingo-oophorectomy (5699933577) in  at 30 Years.  Comments:  2010 1:59 PM CDT - Hoda Fortune  benign fibroids  Cholecystectomy (35232951) in 1968 at 23 Years.  Appendectomy (696404000) in  at 15 Years.  Tonsillectomy (031410421) in 6 at 11 Years.  Dilatation and curettage (6680738815).  Comments:  2010 1:59 PM CDT - Hoda Fortune  benign   Social History:        Alcohol Assessment            Never      Tobacco Assessment            Past                     Comments:                      2012 - Brooke Barraza                     Quit in        Employment and Education Assessment            Retired, Work/School description: [employer].        Physical Examination   vital signs stable, as noted above   Vital Signs   9/18/2019 3:40 PM CDT Temperature Tympanic 97.8 DegF  LOW    Peripheral Pulse Rate 60 bpm    Systolic Blood Pressure 130 mmHg    Diastolic Blood Pressure 60 mmHg    Mean Arterial Pressure 83 mmHg    BP Site Right arm      Measurements from flowsheet : Measurements   9/18/2019 3:40 PM CDT Height Measured - Standard 61.25 in    Weight Measured - Standard 165.4 lb    BSA 1.8 m2    Body Mass Index 30.99 kg/m2  HI      General:  Alert and oriented, No acute distress.    Eye:  Pupils are equal, round and reactive to light, Extraocular movements are intact, Normal conjunctiva.    HENT:  Normocephalic, Oral mucosa is moist.    Neck:  Supple, No carotid bruit, No jugular venous distention, No lymphadenopathy.    Respiratory:  Lungs are clear to auscultation, Respirations are non-labored.    Cardiovascular:  Normal rate, Regular rhythm, No murmur, No edema.    Gastrointestinal:  Soft, Non-tender.    Musculoskeletal:  Normal strength.    Neurologic:  Alert, Oriented, Normal motor function, No focal deficits, Cranial Nerves II-XII are grossly intact.    Cognition and Speech:  Oriented, Speech clear and coherent, Functional cognition intact.    Psychiatric:  Appropriate mood & affect.       Review / Management   Results review:  Lab results: 9/13/2019 10:09 AM CDT   Hgb A1c                   8.1  HI.       Impression and Plan   Diagnosis     Obesity (RDA87-TR E66.9).     Diabetes Mellitus, Type 2 (KIA01-EM E11.9).     Background Diabetic Retinopathy (ZZN63-BL E11.311).     Arteriosclerotic Heart Disease (ASHD) (NXP26-TI I25.10).     Hyperlipemia (GIW94-LI E78.2).         .) CAD   - Lexiscan (12/2016) - no reversible defects   - previously intolerant to higher dose ISMN, she does have known atherostenotic disease involving LCx   - ASA, atenolol 25mg daily,  atorvastatin 40mg QHS,    - lower threshold for PCI given historic intolerance to long-acting nitrates    .) GERD/esophagitis   - EGD in 12/2016 showing gastric ulcer - previous symptoms now resolved   - pantoprazole 40mg daily   - denies any NSAIDs    .) type II DM, uncontrolled (though stable)  hypoglycemic medications: intolerant to metformin due to GI symptoms  insulin therapy: Toujeo 35 units, Humalog 12-15 units TID + SSI   - ICR 1:3, sensitivity 10 (~20 units q meal)   - could consider future GLP1 agonist  last HbA1c: 8.1%   microvascular complications: proliferative retinopathy - sees ophtho regularly, mo microalbuminuria  macrovascular complications: CAD  ASA/GILDA/statin:  ASA 325mg daily, no microalbuminuira, atorvastatin 40mg    - previous myalgias on higher dose of statin    .) HTN, controlled  current antihypertensive regimen: atenolol/chlorthalidone 25/12.5mg, amlodipine 5mg,   regimen changes:  intolerance:  future titration/work-up plan: low threshold to change/add ACEI if development of microalbuminuria    .) Health maintenance   - declines any means of CRC screening   - DEXA '18: osteopenia   - prior DEE   - q2 yr mammography   - declines adult vaccinations except for pneumococcal series    RTC in 6 months

## 2022-02-16 NOTE — LETTER
(Inserted Image. Unable to display)     April 22, 2019      ALEJANDRAGLENN HERRERA   [place], [place] [id number]          Dear ALEJANDRA,      Thank you for selecting [facility] ([facility]viously [facility]) for your healthcare needs.    Our records indicate you are due for the following services:    Diabetic Exam ~ Please bring your glucose meter and/or your blood glucose diary to your appointment.    Non-Fasting Labs.    If you had your labs done at another facility or with Direct Access Lab Testing at [facility], please bring in a copy of the results to your next visit, mail a copy, or drop off a copy of your results to your Healthcare Provider.    You are due for lab work and an office visit; please schedule the lab appointment 1 week before the office visit.  This will assure all results are available to discuss with your provider during your visit.    **It is very helpful if you bring your medication bottles to your appointment.  This assures we have all of your current medications, including strength and dosing information, documented accurately in your medical record.    To schedule an appointment or if you have further questions, please contact your primary clinic:   [facility]       [phone]   [facility]       [phone]              [facility]     [phone]      Powered by [employer] and [employer]    Sincerely,    Edgardo Luong MD

## 2022-02-16 NOTE — TELEPHONE ENCOUNTER
---------------------  From: Paula Concepcion CMA (eRx Pool ([id number]))   To: [employer] Message Pool ([id number]_[place] [facility]);     Sent: 8/3/2021 8:53:00 AM CDT  Subject: FW: Medication Management   Due Date/Time: 8/3/2021 7:57:00 AM CDT           ------------------------------------------  From: [facility]  To: Edgardo Luong MD  Sent: August 2, 2021 7:57:01 AM CDT  Subject: Medication Management  Due: August 2, 2021 3:18:34 PM CDT     ** On Hold Pending Signature **     Dispensed Drug: traMADol (traMADol 50 mg oral tablet), TAKE 1 TABLET BY MOUTH EVERY 4 HOURS AS NEEDED FOR PAIN  Quantity: 30 tab(s)  Days Supply: 5  Refills: 0  Substitutions Allowed  Notes from Pharmacy:  ---------------------------------------------------------------  From: Paula Concepcion CMA ([employer] Message Pool ([facility]))   To: [employer] Message Pool ([id number]_[place] [facility] [place]);     Sent: 8/3/2021 12:10:12 PM CDT  Subject: FW: Medication Management   Due Date/Time: 8/3/2021 7:57:00 AM CDT     LRF 10/22/2019 #30  due for her chronic disease visit this month    LM for a return call at 1209 to find out what her symptoms are for Tramadol requestPatient returning call at 1411. Call to pt at 1429. Patient states that she is having back and leg pain. Patient notified of appt needed and was transferred to scheduling.---------------------  From: Paula Concepcion CMA   To: [facility]    Sent: 8/4/2021 7:04:34 AM CDT  Subject: FW: Medication Management     ** Not Approved: Patient needs appointment **  traMADol (traMADol HCl 50 MG Oral Tablet)  TAKE 1 TABLET BY MOUTH EVERY 4 HOURS AS NEEDED FOR PAIN  Qty:  30 tab(s)        Days Supply:  5        Refills:  0          Substitutions Allowed     Route To Pharmacy - [facility]   Signed by Paula Concepcion CMA

## 2022-02-16 NOTE — NURSING NOTE
Depression Screening Entered On:  1/24/2019 5:52 PM CST    Performed On:  1/22/2019 5:51 PM CST by Kathryn Worrell               Depression Screening   Feeling Down, Depressed, Hopeless :   Not at all   Little Interest - Pleasure in Activities :   Not at all   Initial Depression Screen Score :   0    Trouble Falling or Staying Asleep :   Not at all   Feeling Tired or Little Energy :   Not at all   Poor Appetite or Overeating :   Not at all   Feeling Bad About Yourself :   Not at all   Trouble Concentrating :   Not at all   Moving or Speaking Slowly :   Not at all   Thoughts Better Off Dead or Hurting Self :   Not at all   Detailed Depression Screen Score :   0    Total Depression Screen Score :   0    NIKOLE Difficulty with Work, Home, Others :   Not difficult at all   Kathryn Worrell - 1/24/2019 5:51 PM CST

## 2022-02-16 NOTE — NURSING NOTE
Comprehensive Intake Entered On:  2/19/2021 1:52 PM CST    Performed On:  2/19/2021 1:49 PM CST by Paula Concepcion CMA               Summary   Chief Complaint :   f/u chronic    Weight Measured :   170.4 lb(Converted to: 170 lb 6 oz, 77.292 kg)    Systolic Blood Pressure :   126 mmHg   Diastolic Blood Pressure :   60 mmHg   Mean Arterial Pressure :   82 mmHg   Peripheral Pulse Rate :   108 bpm (HI)    Temperature Tympanic :   97.6 DegF(Converted to: 36.4 DegC)  (LOW)    Oxygen Saturation :   96 %   Race :      Languages :   English   Paula Concepcion CMA - 2/19/2021 1:49 PM CST   Health Status   Allergies Verified? :   Yes   Medication History Verified? :   Yes   Medical History Verified? :   Yes   Pre-Visit Planning Status :   Completed   Tobacco Use? :   Former smoker   Paula Concepcion CMA - 2/19/2021 1:49 PM CST   ID Risk Screen   Recent Travel History :   No recent travel   Family Member Travel History :   No recent travel   Other Exposure to Infectious Disease :   Unknown   COVID-19 Testing Status :   No positive COVID-19 test   Paula Concepcion CMA - 2/19/2021 1:49 PM CST

## 2022-02-16 NOTE — TELEPHONE ENCOUNTER
Entered by Indiana Colbert on August 23, 2021 10:57:00 AM CDT  ---------------------  From: Indiana Colbert   To: [place] [address]    Sent: 8/23/2021 10:57:00 AM CDT  Subject: Medication Management     ** Not Approved: refilled on 816/2021 **  insulin aspart (NovoLOG FlexPen 100 UNIT/ML Subcutaneous Solution Pen-injector)  INJECT 12 TO 15  THREE TIMES DAILY BEFORE MEAL(S)  Qty:  30 mL        Days Supply:  67        Refills:  0          NAMRATA     Route To Pharmacy - [place] [address]   Signed by Indiana Colbert            ------------------------------------------  From: [place] [address]  To: Edgardo Luong MD  Sent: August 19, 2021 1:01:55 PM CDT  Subject: Medication Management  Due: August 20, 2021 11:16:59 AM CDT     ** On Hold Pending Signature **     Dispensed Drug: insulin aspart (NovoLOG FlexPen 100 units/mL injectable solution), INJECT 12 TO 15 THREE TIMES DAILY BEFORE MEAL(S)  Quantity: 30 mL  Days Supply: 67  Refills: 0  Substitutions Allowed  Notes from Pharmacy:  ------------------------------------------

## 2022-02-16 NOTE — TELEPHONE ENCOUNTER
Entered by Paula Concepcion CMA on July 05, 2019 1:39:16 PM CDT  ---------------------  From: Paula Concepcion CMA   To: [place] [address]    Sent: 7/5/2019 1:39:16 PM CDT  Subject: Medication Management     ** Submitted: **  Order:amLODIPine (amLODIPine 5 mg oral tablet)  1 tab(s)  Oral  daily  Qty:  90 tab(s)        Days Supply:  90        Refills:  0          Substitutions Allowed     Route To Pharmacy - [place] [address]    Signed by Paula Concepcion CMA  7/5/2019 1:39:00 PM    ** Submitted: **  Complete:amLODIPine (amLODIPine 5 mg oral tablet)   Signed by Paula Concepcion CMA  7/5/2019 1:39:00 PM    ** Not Approved:  **  amLODIPine (AMLODIPINE 5MG TAB)  TAKE 1 TABLET BY MOUTH ONCE DAILY  Qty:  90 tab(s)        Days Supply:  90        Refills:  0          Substitutions Allowed     Route To Pharmacy - [place] [address]   Signed by Paula Concepcion CMA          filled for 1year in Oct to [facility]      ** Patient matched by Paula Concepcion CMA on 7/5/2019 1:38:29 PM CDT **      ------------------------------------------  From: [place] [address]  To: Edgardo Luong MD  Sent: July 3, 2019 6:18:58 PM CDT  Subject: Medication Management  Due: July 4, 2019 6:18:58 PM CDT    ** On Hold Pending Signature **  Drug: amLODIPine (amLODIPine 5 mg oral tablet)  TAKE 1 TABLET BY MOUTH ONCE DAILY  Quantity: 90 tab(s)     Days Supply: 0         Refills: 0  Substitutions Allowed  Notes from Pharmacy:     Dispensed Drug: amLODIPine (amLODIPine 5 mg oral tablet)  TAKE 1 TABLET BY MOUTH ONCE DAILY  Quantity: 90 tab(s)     Days Supply: 90        Refills: 0  Substitutions Allowed  Notes from Pharmacy:   ------------------------------------------

## 2022-02-16 NOTE — NURSING NOTE
Depression Screening Entered On:  2/20/2020 1:21 PM CST    Performed On:  2/20/2020 1:21 PM CST by Prieto GARCIA, Korin               Depression Screening   Little Interest - Pleasure in Activities :   Not at all   Feeling Down, Depressed, Hopeless :   Not at all   Initial Depression Screen Score :   0    Trouble Falling or Staying Asleep :   Not at all   Feeling Tired or Little Energy :   Not at all   Poor Appetite or Overeating :   Not at all   Feeling Bad About Yourself :   Not at all   Trouble Concentrating :   Not at all   Moving or Speaking Slowly :   Not at all   Thoughts Better Off Dead or Hurting Self :   Not at all   Detailed Depression Screen Score :   0    Total Depression Screen Score :   0    NIKOLE Difficulty with Work, Home, Others :   Not difficult at all   Prieto GARCIA, Korin - 2/20/2020 1:21 PM CST

## 2022-02-16 NOTE — NURSING NOTE
Quick Intake Entered On:  8/11/2021 1:59 PM CDT    Performed On:  8/11/2021 1:59 PM CDT by Donna Quiñones               Summary   Chief Complaint :   BP recheck    Systolic Blood Pressure :   151 mmHg (HI)    Diastolic Blood Pressure :   80 mmHg   Mean Arterial Pressure :   104 mmHg   BP Site :   Right arm   BP Method :   Electronic   Race :      Languages :   English   Donna Quiñones - 8/11/2021 1:59 PM CDT

## 2022-02-16 NOTE — LETTER
(Inserted Image. Unable to display)     December 20, 2019      ALEJANDRA HERRERA   [place], [place] [id number]          Dear ALEJANDRA,      Thank you for selecting [facility] (previously [facility]) for your healthcare needs.      Our records indicate you are due for the following services:     Non-Fasting Labs.    If you had your labs done at another facility or with Direct Access Lab Testing at [facility], please bring in a copy of the results to your next visit, mail a copy, or drop off a copy of your results to your Healthcare Provider.      To schedule an appointment or if you have further questions, please contact your primary clinic:   [facility]       [phone]   [facility]       [phone]              [facility]     [phone]      Powered by [employer]    Sincerely,    Edgardo Luong MD

## 2022-02-16 NOTE — TELEPHONE ENCOUNTER
Entered by Irais Azevedo CMA on April 21, 2021 7:59:48 AM CDT  ---------------------  From: Irais Azevedo CMA   To: [place] [id number]    Sent: 4/21/2021 7:59:48 AM CDT  Subject: Medication Management     ** Not Approved: Patient has requested refill too soon, #90, 1 sent 2/19/21 **  atorvastatin (Atorvastatin Calcium 40 MG Oral Tablet)  Take 1 tablet by mouth once daily  Qty:  90 tab(s)        Days Supply:  90        Refills:  0          Substitutions Allowed     Route To Pharmacy - [place] [id number]   Signed by Irais Azevedo CMA            ------------------------------------------  From: [place] [address]  To: Edgardo Luong MD  Sent: April 20, 2021 9:25:32 AM CDT  Subject: Medication Management  Due: April 7, 2021 1:46:04 PM CDT     ** On Hold Pending Signature **     Dispensed Drug: atorvastatin (atorvastatin 40 mg oral tablet), Take 1 tablet by mouth once daily  Quantity: 90 tab(s)  Days Supply: 90  Refills: 0  Substitutions Allowed  Notes from Pharmacy:  ------------------------------------------

## 2022-02-16 NOTE — LETTER
(Inserted Image. Unable to display)   August 21, 2019        ALEJANDRA HERRERA   [place], [place] [id number]        Dear ALEJANDRA,      Thank you for selecting [facility] (previously [facility]) for your healthcare needs.    Our records indicate you are due for the following services:     Non-Fasting Labs    If you had your labs done at another facility or with Direct Access Lab Testing at [facility], please bring in a copy of the results to your next visit, mail a copy, or drop off a copy of your results to your Healthcare Provider.    To schedule an appointment or if you have further questions, please contact your primary clinic:   [facility]       [phone]   [facility]       [phone]              [facility]     [phone]      Powered by [employer]    Sincerely,    Edgardo Luong M.D.

## 2022-02-16 NOTE — TELEPHONE ENCOUNTER
---------------------  From: Korin Moreau MA (Phone Messages Pool ([id number]_[facility]))   To: Edgardo Luong MD;     Cc: [facility] Message Pool ([id number] [facility] [place]);      Sent: 2/4/2021 11:27:41 AM CST  Subject: CMN form     Phone Message    PCP:   [facility]      Time of Call:  1113       Person Calling:  Debbie from [employer]  Phone number:  [phone]    Reason for call:  CMN form was faxed to [facility] 1/29/2021 to be filled out for pts [employer] and haven't received yet.  Returned call at: _    Note:   form is in [facility] box.  Can be faxed to [phone]    Last office visit and reason:  1/26/2021 w/ [facility] for URI; 7/22/2020 w/ [facility] for CDM f/u; RTC past due    Transferred to: _---------------------  From: Edgardo Luong MD   To: [facility] Message Pool ([id number] [facility] [place]);     Sent: 2/5/2021 8:55:47 AM CST  Subject: RE: CMN form     [facility]

## 2022-02-16 NOTE — LETTER
(Inserted Image. Unable to display)   [address] [place] [address]  March 29, 2021      ALEJANDRA HERRERA  [address] [id number]        Dear ALEJANDRA,      Thank you for selecting [facility] ([facility]viously [facility]) for your healthcare needs.      This letter is to inform you that we have received a copy of your mammogram results.  Your results were normal unless noted below.  You will also receive notice of your results from the radiologist within 7-10 days.  This letter is to let you know I have also received a copy and it is filed in your clinic chart.      Please plan on having your next mammogram done in 12-24 months.          Please contact me or my assistant at [phone] if you have any questions or concerns.     Sincerely,        Edgardo Luong MD

## 2022-02-16 NOTE — LETTER
(Inserted Image. Unable to display)   February 01, 2021      ALEJANDRA HERRERA   [place], [place] [id number]        Dear ALEJANDRA,     Thank you for selecting [facility] ([facility]viously [facility]) for your healthcare needs. Below you will find the results of your recent test(s) done at our clinic.      Labs for your review. Blood testing rules out any significant bacterial component of infection.  Like we discussed, this supports viral infection.  Despite COVID swab results being normal, I cannot rule out COVID infection completely, and you should complete a time of quarantine until symptoms resolved.      Result Name Current Result Previous Result Reference Range   C-Reactive Protein (CRP) (mg/L)  1.4 1/26/2021   - <8.0   Hgb A1c ((H)) 7.3 1/26/2021 ((H)) 7.9 7/16/2020  - <5.7   Procalcitonin (PCT) (ng/mL)  <0.10 1/26/2021   - <0.10       Please contact me or my assistant at [phone] if you have any questions or concerns.     Sincerely,        Edgardo Luong MD    What do your labs mean?  Below is a glossary of commonly ordered labs:  LDL - Bad Cholesterol  HDL - Good Cholesterol  AST/ALT - Liver Function  Cr/Creatinine - Kidney Function  Microalbumin - Kidney Function  BUN - Kidney Function  PSA - Prostate   TSH - Thyroid Hormone  HgbA1c - Diabetes Test  Hgb (Hemoglobin) - Red Blood Cells

## 2022-02-16 NOTE — NURSING NOTE
Pt appears on [facility] chronic disease list out of parameters for A1c level of 8.5 on 5/18/19. Other labs and visit due in Nov 2019- so will place RTC today for A1c only in 2 weeks.

## 2022-02-18 ENCOUNTER — OFFICE VISIT - RIVER FALLS (OUTPATIENT)
Dept: FAMILY MEDICINE | Facility: CLINIC | Age: 77
End: 2022-02-18
Payer: MEDICARE

## 2022-03-02 VITALS
OXYGEN SATURATION: 99 % | WEIGHT: 166.3 LBS | DIASTOLIC BLOOD PRESSURE: 76 MMHG | HEART RATE: 60 BPM | SYSTOLIC BLOOD PRESSURE: 130 MMHG | TEMPERATURE: 98.1 F | BODY MASS INDEX: 31.17 KG/M2

## 2022-03-02 NOTE — NURSING NOTE
Comprehensive Intake Entered On:  2/18/2022 1:45 PM CST    Performed On:  2/18/2022 1:42 PM CST by Paula Concepcion CMA               Summary   Chief Complaint :   f/u chronic    Weight Measured :   166.3 lb(Converted to: 166 lb 5 oz, 75.432 kg)    Systolic Blood Pressure :   152 mmHg (HI)    Diastolic Blood Pressure :   77 mmHg   Mean Arterial Pressure :   102 mmHg   Peripheral Pulse Rate :   60 bpm   Temperature Tympanic :   98.1 DegF(Converted to: 36.7 DegC)    Oxygen Saturation :   99 %   Race :      Languages :   English   Paula Concepcion CMA - 2/18/2022 1:42 PM CST   Health Status   Allergies Verified? :   Yes   Medication History Verified? :   Yes   Medical History Verified? :   No   Pre-Visit Planning Status :   Completed   Paula Concepcion CMA - 2/18/2022 1:42 PM CST   Consents   Consent for Immunization Exchange :   Consent Granted   Consent for Immunizations to Providers :   Consent Granted   Paula Concepcion CMA - 2/18/2022 1:42 PM CST   Social History   Social History   (As Of: 2/18/2022 1:45:31 PM CST)   Alcohol:        Never   (Last Updated: 1/24/2019 5:51:28 PM CST by Kathryn Worrell)          Tobacco:        Past   (Last Updated: 4/21/2010 2:07:14 PM CDT by Hoda Fortune CMA)   Former smoker, quit more than 30 days ago   Comments:  9/25/2012 1:41 PM - Brooke Barraza: Quit in 2000   (Last Updated: 2/18/2022 1:43:20 PM CST by Paula Concepcion CMA)   Former smoker, quit more than 30 days ago   (Last Updated: 1/26/2021 1:45:59 PM CST by Paula Concepcion CMA)          Electronic Cigarette/Vaping:        Electronic Cigarette Use: Never.   (Last Updated: 2/18/2022 1:43:24 PM CST by Paula Concepcion CMA)          Employment/School:        Retired, Work/School description: [employer]   (Last Updated: 7/2/2010 10:15:57 AM CDT by Lew Huggins MD)

## 2022-03-02 NOTE — NURSING NOTE
Vital Signs Entered On:  2/18/2022 1:46 PM CST    Performed On:  2/18/2022 1:46 PM CST by Paula Concepcion CMA               Vital Signs   Systolic Blood Pressure :   130 mmHg   Diastolic Blood Pressure :   76 mmHg   Mean Arterial Pressure :   94 mmHg   Paula Concepcion CMA - 2/18/2022 1:46 PM CST

## 2022-03-02 NOTE — LETTER
(Inserted Image. Unable to display)   [address] [place] [address]  February 16, 2022      ALEJANDRA HERRERA  [address] [place] [id number]        Dear ALEJANDRA,     Thank you for selecting [facility] for your healthcare needs. Below you will find the results of your recent test(s) done at our clinic.       Labs for your review.  We can discuss in more detail at future clinic visit.       Result Name Current Result Previous Result Reference Range   Glucose Level (mg/dL) ((H)) 153 2/12/2022 ((H)) 118 1/26/2021 65 - 99   BUN (mg/dL) ((H)) 29 2/12/2022  21 1/26/2021 7 - 25   Creatinine Level (mg/dL) ((H)) 1.78 2/12/2022 ((H)) 1.43 1/26/2021 0.60 - 0.93   eGFR (mL/min/1.73m2) ((L)) 27 2/12/2022 ((L)) 34 7/16/2020 > OR = 60 -    eGFR  (mL/min/1.73m2) ((L)) 31 2/12/2022 ((L)) 43 1/26/2021 > OR = 60 -    BUN/Creat Ratio  16 2/12/2022  15 1/26/2021 6 - 22   Sodium Level (mmol/L)  140 2/12/2022  143 1/26/2021 135 - 146   Potassium Level (mmol/L)  4.6 2/12/2022  4.5 1/26/2021 3.5 - 5.3   Chloride Level (mmol/L)  106 2/12/2022 ((H)) 111 1/26/2021 98 - 110   CO2 Level (mmol/L)  28 2/12/2022  26 1/26/2021 20 - 32   Calcium Level (mg/dL)  9.8 2/12/2022  9.1 1/26/2021 8.6 - 10.4   WBC  7.6 2/12/2022  9.3 1/26/2021 3.8 - 10.8   RBC  3.97 2/12/2022 ((L)) 3.60 1/26/2021 3.80 - 5.10   Hgb (gm/dL) ((L)) 11.1 2/12/2022 ((L)) 10.3 1/26/2021 11.7 - 15.5   Hct (%) ((L)) 34.3 2/12/2022 ((L)) 31.8 1/26/2021 35.0 - 45.0   MCV (fL)  86.4 2/12/2022  88 1/26/2021 80.0 - 100.0   MCH (pg)  28.0 2/12/2022  28.6 1/26/2021 27.0 - 33.0   MCHC (gm/dL)  32.4 2/12/2022  32.4 1/26/2021 32.0 - 36.0   RDW (%)  12.3 2/12/2022  13.3 1/26/2021 11.0 - 15.0   Platelet  236 2/12/2022  213 1/26/2021 140 - 400   MPV (fL)  11.8 2/12/2022  10.9 1/26/2021 7.5 - 12.5   Hgb A1c ((H)) 8.2 2/12/2022 ((H)) 8.2 8/7/2021  - <5.7       Please contact me or my assistant at  [phone] if you have any questions or concerns.     Sincerely,        Edgardo Luong MD    What do your labs mean?  Below is a glossary of commonly ordered labs:  LDL - Bad Cholesterol  HDL - Good Cholesterol  AST/ALT - Liver Function  Cr/Creatinine - Kidney Function  Microalbumin - Kidney Function  BUN - Kidney Function  PSA - Prostate   TSH - Thyroid Hormone  HgbA1c - Diabetes Test  Hgb (Hemoglobin) - Red Blood Cells

## 2022-03-02 NOTE — TELEPHONE ENCOUNTER
Entered by Paula Concepcion CMA on January 25, 2022 1:02:34 PM CST  ---------------------  From: Paula Concepcion CMA   To: [place] [address]    Sent: 1/25/2022 1:02:33 PM CST  Subject: Medication Management     ** Submitted: **  Order:pantoprazole (pantoprazole 40 mg oral delayed release tablet)  1 tab(s)  Oral  daily  Qty:  30 tab(s)        Refills:  0          Substitutions Allowed     Route To Pharmacy - [place] [address]    Signed by Paula Concepcion CMA  1/25/2022 7:02:00 PM [facility]    ** Submitted: **  Complete:pantoprazole (pantoprazole 40 mg oral delayed release tablet)   Signed by Paula Concepcion CMA  1/25/2022 7:02:00 PM [facility]    ** Not Approved:  **  pantoprazole (Pantoprazole Sodium 40 MG Oral Tablet Delayed Release)  Take 1 tablet by mouth once daily  Qty:  90 tab(s)        Days Supply:  90        Refills:  0          Substitutions Allowed     Route To Pharmacy - [place] [address]   Signed by Paula Concepcion CMA            ------------------------------------------  From: [place] [address]  To: Edgardo Luong MD  Sent: January 21, 2022 11:56:03 AM CST  Subject: Medication Management  Due: January 21, 2022 11:04:18 AM CST     ** On Hold Pending Signature **     Dispensed Drug: pantoprazole (pantoprazole 40 mg oral delayed release tablet), Take 1 tablet by mouth once daily  Quantity: 90 tab(s)  Days Supply: 90  Refills: 0  Substitutions Allowed  Notes from Pharmacy:  ------------------------------------------

## 2022-03-02 NOTE — PROGRESS NOTES
Patient:   ALEJANDRA HERRERA            MRN: [id number]            FIN: [id number]               Age:   76 years     Sex:  Female     :  1945   Associated Diagnoses:   Obesity; Diabetes mellitus, type 2; Background Diabetic Retinopathy; Arteriosclerotic Heart Disease (ASHD); Hyperlipemia   Author:   Edgardo Luong MD      Visit Information      Date of Service: 2022 01:20 pm  Performing Location: [facility]  Encounter#: [id number]      Primary Care Provider (PCP):  Edgardo Luong MD    NPI# [id number]      Referring Provider:  Edgardo Luong MD, NPI# [id number]      Chief Complaint   2022 1:42 PM CST    f/u chronic              Additional Information:No additional information recorded during visit.   Chief complaint and symptoms as noted above and confirmed with patient      History of Present Illness   2014: Alejandra presents to clinic to establish care, previously followed by [facility].  She has a history of type 2 diabetes historically with uncontrolled hyperglycemia with hemoglobin A1c consistently above 9%, until approximately 1-1/2 years ago she was transitioned to Lantus basal and Lispro bolus insulin dosing with significant improvement in her glycemic control. Currently taking Lantus 40 units nightly, Humalog 15 units 3 times a day before meals.  She has a history of retinopathy, no known peripheral neuropathy or nephropathy.  She has remote history of atherosclerotic coronary artery disease, undergoing angioplasty in the mid 90s.  Denies any anginal equivalent symptoms.  She has never undergone screening for colorectal cancer. Labs reviewed with her.     2021: Alejandra presents to clinic with 1 week history of general malaise, right-sided ear pain and lymphadenitis with associated cough and shortness of breath.  Previously had issues with sore throat and particularly tender anterior cervical lymph nodes.  Now able to swallow with less difficulty.  No described sick contacts.  She says that  she is remained isolated.  Her last recollection of leaving the house was over a week ago when running errands in [employer]  2/18/2022: Marni presents to clinic for regular follow-up.  She is been doing reasonably well without any significant health events or problems this winter.  She describes her  having overall declining health.  He is currently 84 years old.  Still independent with ADLs though she has noticed a general functional decline.  Stable insulin needs.  Reviewed labs with her demonstrating stable overall glycemic control.         Review of Systems   Constitutional:  No fever, No chills, No weakness, No fatigue.    Eye:  Negative except as documented in history of present illness.    Ear/Nose/Mouth/Throat:  No decreased hearing, No sore throat.    Respiratory:  No shortness of breath, No cough, No wheezing.    Cardiovascular:  No chest pain, No palpitations, No peripheral edema, No syncope.    Gastrointestinal:  No vomiting, No constipation, No heartburn, No abdominal pain.    Genitourinary:  No dysuria, No hematuria.    Hematology/Lymphatics:  Negative except as documented in history of present illness.    Endocrine:  No excessive thirst, No polyuria.    Immunologic:  No recurrent fevers, No malaise.    Musculoskeletal:  Back pain, Joint pain, No neck pain, No muscle pain, No decreased range of motion.    Integumentary:  varicose veins.    Neurologic:  Alert and oriented X4, No numbness, No tingling, No headache.       Health Status   Allergies:    Allergic Reactions (Selected)  Severity Not Documented  Actos (Gi upset,edema)  Avalide (Angioedema)  Avandia (Breathing problem)  Avelox (Shaky)  Benadryl (Hives)  Glucophage (Gi sx)  Lisinopril (Angioedema)  Naprosyn (Hives)  Penicillin (Hives)  PredniSONE (Hives)  Sulfa drug (Hives and nausea and vomitting)  Tylenol (Feet swelling)  Zithromax (Breathing trouble)  Nonallergic Reactions (Selected)  Unknown  Hydrochlorothiazide-irbesartan  (Angioedema)  Ibuprofen (No reactions were documented)  Peanuts (Itching)   Medications:  (Selected)   Prescriptions  Prescribed  Insulin Aspart FlexPen 100 units/mL injectable solution: See Instructions, Instructions: 12-15 units TIDAC Subcutaneous 45, # 45 mL, 0 Refill(s), Type: Maintenance, Pharmacy: [facility] [id number], in place of Novolog due to backorder, 12-15 units TIDAC Subcutaneous 45, 61.25, in, 07/22/20 10:58:00 CDT, Hei...  NovoLOG FlexPen 100 units/mL injectable solution: See Instructions, Instructions: 12-15 units Subcutaneous tidac, # 60 mL, 1 Refill(s), Type: Maintenance, Pharmacy: [facility], keep on hold, 12-15 units Subcutaneous tidac, 61.25, in, 07/22/20 10:58:00 CDT, Height Measured, 167.3, lb, 08/11...  Toujeo Max SoloStar 300 units/mL subcutaneous solution: ( 36 units ), Subcutaneous, daily, # 45 mL, 1 Refill(s), Type: Maintenance, Pharmacy: [place] [facility] [id number], keep on hold, 36 units Subcutaneous daily, 61.25, in, 07/22/20 10:58:00 CDT, Height Measured, 167.3, lb, 08/11/21 12:53:00 CDT, Weight Measured...  Ventolin HFA 90 mcg/inh inhalation aerosol: See Instructions, Instructions: 1-2 puff(s) Inhale q2-4hr, PRN: as needed for wheezing, # 1 EA, 1 Refill(s), Type: Maintenance, Pharmacy: [place] [facility] [id number], 1-2 puff(s) Inhale q2-4hr,PRN:as needed for wheezing  amLODIPine 5 mg oral tablet: = 1 tab(s), Oral, daily, # 90 tab(s), 1 Refill(s), Type: Maintenance, Pharmacy: [facility] [id number], keep on file-pt will notify when needed, 1 tab(s) Oral daily, 61.25, in, 07/22/20 10:58:00 CDT, Height Measured, 167.3, lb, 08/11/21 12:53:00 CDT, W...  aspirin 325 mg oral tablet: 1 tab(s) ( 325 mg ), PO, Daily, # 30 tab(s), 0 Refill(s), Type: Maintenance, OTC (Rx)  atenolol-chlorthalidone 50 mg-25 mg oral tablet: 0.5 tab(s), Oral, daily, # 45 tab(s), 1 Refill(s), Type: Maintenance, Pharmacy: [id number], keep on file-pt will notify when needed, 0.5 tab(s) Oral daily, 61.25, in,  07/22/20 10:58:00 CDT, Height Measured, 167.3, lb, 08/11/21 12:53:00 CDT,...  atorvastatin 40 mg oral tablet: = 1 tab(s), Oral, daily, # 90 tab(s), 1 Refill(s), Type: Maintenance, Pharmacy: [facility], keep on file-pt will notify when needed, 1 tab(s) Oral daily, 61.25, in, 07/22/20 10:58:00 CDT, Height Measured, 167.3, lb, 08/11/21 12:53:00 CDT, W...  nitroglycerin 0.4 mg sublingual tablet: = 1 tab(s), SL, q5min, PRN: for chest pain, # 25 tab(s), 1 Refill(s), Type: Maintenance, Pharmacy: [facility], keep on hold and pt will notify when needed, 1 tab(s) SL q5 min,PRN:for chest pain  pantoprazole 40 mg oral delayed release tablet: = 1 tab(s), Oral, daily, # 30 tab(s), 0 Refill(s), Type: Maintenance, Pharmacy: [facility], due for a visit next month, 1 tab(s) Oral daily, 61.25, in, 07/22/20 10:58:00 CDT, Height Measured, 167.3, lb, 08/11/21 12:53:00 CDT, Weight Measured...,    Medications          *denotes recorded medication          Ventolin HFA 90 mcg/inh inhalation aerosol: See Instructions, 1-2 puff(s) Inhale q2-4hr, PRN: as needed for wheezing, 1 EA, 1 Refill(s).          amLODIPine 5 mg oral tablet: 1 tab(s), Oral, daily, 90 tab(s), 1 Refill(s).          aspirin 325 mg oral tablet: 325 mg, 1 tab(s), PO, Daily, 30 tab(s).          atenolol-chlorthalidone 50 mg-25 mg oral tablet: 0.5 tab(s), Oral, daily, 45 tab(s), 1 Refill(s).          atorvastatin 40 mg oral tablet: 1 tab(s), Oral, daily, 90 tab(s), 1 Refill(s).          NovoLOG FlexPen 100 units/mL injectable solution: See Instructions, 12-15 units Subcutaneous tidac, 60 mL, 1 Refill(s).          Insulin Aspart FlexPen 100 units/mL injectable solution: See Instructions, 12-15 units TIDAC Subcutaneous 45, 45 mL, 0 Refill(s).          Toujeo Max SoloStar 300 units/mL subcutaneous solution: 36 units, Subcutaneous, daily, 45 mL, 1 Refill(s).          nitroglycerin 0.4 mg sublingual tablet: 1 tab(s), SL, q5min, PRN: for chest pain,  25 tab(s), 1 Refill(s).          pantoprazole 40 mg oral delayed release tablet: 1 tab(s), Oral, daily, 30 tab(s), 0 Refill(s).       Problem list:    All Problems  Acute low back pain / SNOMED CT 647696323 / Confirmed  Arteriosclerotic heart disease (ASHD) / SNOMED CT 71130863 / Confirmed  GERD (gastroesophageal reflux disease) / SNOMED CT 342736497 / Confirmed  Glaucoma / SNOMED CT 37306992 / Confirmed  Hypertension / SNOMED CT 5414549824 / Confirmed  Insulin long-term use / SNOMED CT 6710912773 / Confirmed  Hyperlipemia / SNOMED CT 021113174 / Confirmed  Background diabetic retinopathy / SNOMED CT 5290166543 / Confirmed  Obesity / SNOMED CT 1218482548 / Probable  Diabetes mellitus, type 2 / SNOMED CT 945327130 / Confirmed  Inactive: Tobacco user / ICD-9-.1  Resolved: *Hospitalized@[facility] - Acute low back pain, L4 nerve root impingement  Canceled: Long term current use of oral hypoglycemic drug / SNOMED CT 417606019  Canceled: Obese / ICD-9-.00      Histories   Past Medical History:    Active  Diabetes mellitus, type 2 (212336130): Onset in 1996 at 51 years.  Comments:  1/29/2010 CST 3:00 PM CST Adwoa Concepcion CMAPaula  started Insulin 2/2002  Background diabetic retinopathy (2829990597)  Arteriosclerotic heart disease (ASHD) (10734502)  Comments:  1/29/2010 CST 3:02 PM CST Adwoa Concepcion CMAPaula  Coronary Angioplasty and Stents x 2    11/1/2010 CDT 2:14 PM CDT - Lew Huggins MD  1997& 2000  GERD (gastroesophageal reflux disease) (835890552)  Hypertension (8199733080)  Hyperlipemia (069585768)  Acute low back pain (984351902)  Comments:  6/27/2012 CDT 10:46 AM CDT - Lew Huggins MD  L3-L4 disc with L4 nerve root impingement  Obesity (9019197889)  Glaucoma (66858535)  Comments:  9/25/2012 CDT 1:46 PM CDT - Brooke Barraza  Right eye  Resolved  *Hospitalized@[facility] - Acute low back pain, L4 nerve root impingement: Onset on 6/16/2012 at 66 years.  Resolved.   Family History:    Suicide  Father  ()  Kidney stone  Son (Elliot)  Diabetes mellitus type II  Mother ()  Asthma  Sister (Shira)  Heart attack  Mother ()  Son (Terrence)  CABG - Coronary artery bypass graft  Son (Terrence)  Coronary heart disease  Mother ()     Procedure history:    Diabetic retinal eye exam (2097028974) on 3/24/2021 at 75 Years.  Comments:  3/26/2021 2:49 PM CDT - Srikanth Larson CMA  Diabetic retinopathy OU with Diabetic macular edema in each eye.  20/100 Right eye and 20/30 left eye  Esophagogastroduodenoscopy (090905524) on 2017 at 71 Years.  Comments:  2/10/2017 12:02 PM CST - Quique STINSON, Vinayak  Gastric ulcer  Coronary angiogram (51692417) in the month of 2010 at 64 Years.  Comments:  2010 10:18 AM CDT - Lew Huggins MD  no intervention with stents  OD cataract with IOL in the month of 2006 at 61 Years.  glaucoma surgery OD in the month of 3/2006 at 60 Years.  coronary angioplasty with stents in  at 55 Years.  Coronary angioplasty (41125905) in  at 52 Years.  DEE - Total abdominal hysterectomy and bilateral salpingo-oophorectomy (9663444439) in  at 30 Years.  Comments:  2010 1:59 PM CDT - Hoda Fortune  benign fibroids  Cholecystectomy (03120695) in  at 23 Years.  Appendectomy (353005441) in  at 15 Years.  Tonsillectomy (153537903) in  at 11 Years.  Dilatation and curettage (5129200328).  Comments:  2010 1:59 PM CDT - Hoda Fortune  benign   Social History:        Electronic Cigarette/Vaping Assessment            Electronic Cigarette Use: Never.      Alcohol Assessment            Never      Tobacco Assessment            Former smoker, quit more than 30 days ago            Former smoker, quit more than 30 days ago            Past      Employment and Education Assessment            Retired, Work/School description: [employer].        Physical Examination   vital signs stable, as noted above   Vital Signs   2022 1:46 PM CST Systolic Blood Pressure 130  mmHg    Diastolic Blood Pressure 76 mmHg    Mean Arterial Pressure 94 mmHg   2/18/2022 1:42 PM CST Temperature Tympanic 98.1 DegF    Peripheral Pulse Rate 60 bpm    Systolic Blood Pressure 152 mmHg  HI    Diastolic Blood Pressure 77 mmHg    Mean Arterial Pressure 102 mmHg    Oxygen Saturation 99 %      Measurements from flowsheet : Measurements   2/18/2022 1:42 PM CST    Weight Measured - Standard                166.3 lb     General:  Alert and oriented, No acute distress.    Eye:  Pupils are equal, round and reactive to light, Extraocular movements are intact, Normal conjunctiva.    HENT:  Normocephalic.    Neck:  Supple, Non-tender.    Respiratory:  Lungs are clear to auscultation, Respirations are non-labored.    Cardiovascular:  Normal rate, Regular rhythm, No murmur, No edema.    Musculoskeletal:  Normal strength.    Neurologic:  Alert, Oriented, Normal motor function, No focal deficits.    Cognition and Speech:  Oriented, Speech clear and coherent, Functional cognition intact.    Psychiatric:  Appropriate mood & affect.       Review / Management   Results review:  Lab results   2/12/2022 9:43 AM CST Sodium Level 140 mmol/L    Potassium Level 4.6 mmol/L    Chloride Level 106 mmol/L    CO2 Level 28 mmol/L    Glucose Level 153 mg/dL  HI    BUN 29 mg/dL  HI    Creatinine 1.78 mg/dL  HI    BUN/Creat Ratio 16    eGFR 27 mL/min/1.73m2  LOW    eGFR African American 31 mL/min/1.73m2  LOW    Calcium Level 9.8 mg/dL    Hgb A1c 8.2  HI    WBC 7.6    RBC 3.97    Hgb 11.1 gm/dL  LOW    Hct 34.3 %  LOW    MCV 86.4 fL    MCH 28.0 pg    MCHC 32.4 gm/dL    RDW 12.3 %    Platelet 236    MPV 11.8 fL   .       Impression and Plan   Diagnosis     Obesity (JLI90-DG E66.9).     Diabetes mellitus, type 2 (OEK17-AU E11.9).     Background Diabetic Retinopathy (GCY88-GQ E11.311).     Arteriosclerotic Heart Disease (ASHD) (MDT25-EY I25.10).     Hyperlipemia (ODF02-TF E78.2).       .) GERD/h/o esophagitis   - EGD in 12/2016 showing gastric  ulcer - previous symptoms now resolved   - pantoprazole 40mg daily   - denies any NSAIDs    .) type II DM, uncontrolled   hypoglycemic medications: intolerant to metformin due to GI symptoms  insulin therapy: Toujeo 36 units, Humalog 12-15 units TID + SSI   - ICR 1:3, sensitivity 10 (~20 units q meal)   - could consider future GLP1 agonist   - using CGMS/Freestyle Adry and seeing significant benefits from use  last HbA1c: 8.2%   microvascular complications: proliferative retinopathy - sees ophtho regularly, no microalbuminuria  macrovascular complications: CAD  ASA/GILDA/statin:  ASA 325mg daily, no microalbuminuria, atorvastatin 40mg    - previous myalgias on higher dose of statin    Patient has met criteria for CGM coverage;   - patient has type 2 diabetes mellitus; and  - patient has been using a blood glucose monitor and performing four or more blood glucose test per day; and  - patient is insulin treated with multiple daily injections of three or more times per day; and  - within six months prior to ordering the CGM, the patient has had an in-person visit with the practitioner; and determined that criteria (1-4) above are met; and   - every six months following the initial prescription of the CGM, the treating practitioner has an in-person visit with the patient to assess adherence to their CGM regimen and diabetes treatment plan     .) HTN, controlled  current antihypertensive regimen: atenolol/chlorthalidone 25/12.5mg, amlodipine 5mg,   regimen changes:  intolerance:  future titration/work-up plan: low threshold to change/add ACEI if development of microalbuminuria    .) CAD   - Lexiscan (12/2016) - no reversible defects   - previously intolerant to higher dose ISMN, she does have known atherostenotic disease involving LCx   - ASA, atenolol 25mg daily, atorvastatin 40mg QHS,    - lower threshold for PCI given historic intolerance to long-acting nitrates    .) Health maintenance   - declines any means of CRC  screening   - DEXA '18: osteopenia   - prior DEE   - q2 yr mammography   - discussed COVID vaccination - says she'll schedule in near future    - declining health shared about     RTC in 6 months; prestanding DM + CKD3 labs

## 2022-03-02 NOTE — TELEPHONE ENCOUNTER
Entered by Paula Concepcion CMA on January 07, 2022 9:56:16 AM CST  ---------------------  From: Paula Concepcion CMA   To: [facility] [id number]    Sent: 1/7/2022 9:56:16 AM CST  Subject: Medication Management     ** Submitted: **  Order:pantoprazole (pantoprazole 40 mg oral delayed release tablet)  1 tab(s)  Oral  daily  Qty:  30 tab(s)        Refills:  0          Substitutions Allowed     Route To Pharmacy - [place] [address]    Signed by Paula Concepcion CMA  1/7/2022 3:55:00 PM [facility]    ** Submitted: **  Complete:pantoprazole (pantoprazole 40 mg oral delayed release tablet)   Signed by Paula Concepcion CMA  1/7/2022 3:56:00 PM [facility]    ** Not Approved:  **  pantoprazole (Pantoprazole Sodium 40 MG Oral Tablet Delayed Release)  Take 1 tablet by mouth once daily  Qty:  90 tab(s)        Days Supply:  90        Refills:  0          Substitutions Allowed     Route To Pharmacy - [place] [address]   Signed by Paula Concepcion CMA            ------------------------------------------  From: [place] [address]  To: Edgardo Luong MD  Sent: January 3, 2022 12:08:10 PM CST  Subject: Medication Management  Due: December 15, 2021 8:20:29 PM CST     ** On Hold Pending Signature **     Dispensed Drug: pantoprazole (pantoprazole 40 mg oral delayed release tablet), Take 1 tablet by mouth once daily  Quantity: 90 tab(s)  Days Supply: 90  Refills: 0  Substitutions Allowed  Notes from Pharmacy:  ------------------------------------------last visit 8/6  due for f/u next month, RTC placed

## 2022-03-03 ENCOUNTER — IMMUNIZATION (OUTPATIENT)
Dept: FAMILY MEDICINE | Facility: CLINIC | Age: 77
End: 2022-03-03
Payer: MEDICARE

## 2022-03-03 PROCEDURE — 0011A PR COVID VAC MODERNA 100 MCG/0.5 ML IM: CPT

## 2022-03-03 PROCEDURE — 91301 PR COVID VAC MODERNA 100 MCG/0.5 ML IM: CPT

## 2022-03-31 ENCOUNTER — IMMUNIZATION (OUTPATIENT)
Dept: FAMILY MEDICINE | Facility: CLINIC | Age: 77
End: 2022-03-31
Payer: MEDICARE

## 2022-03-31 DIAGNOSIS — Z23 NEED FOR COVID-19 VACCINE: Primary | ICD-10-CM

## 2022-03-31 PROCEDURE — 99207 PR NO CHARGE NURSE ONLY: CPT

## 2022-03-31 PROCEDURE — 91301 PR COVID VAC MODERNA 100 MCG/0.5 ML IM: CPT

## 2022-03-31 PROCEDURE — 0012A PR COVID VAC MODERNA 100 MCG/0.5 ML IM: CPT

## 2022-04-03 ENCOUNTER — HEALTH MAINTENANCE LETTER (OUTPATIENT)
Age: 77
End: 2022-04-03

## 2022-04-26 ENCOUNTER — APPOINTMENT (OUTPATIENT)
Dept: AUDIOLOGY | Facility: CLINIC | Age: 77
End: 2022-04-26
Payer: MEDICARE

## 2022-06-06 ENCOUNTER — OFFICE VISIT (OUTPATIENT)
Dept: FAMILY MEDICINE | Facility: CLINIC | Age: 77
End: 2022-06-06
Payer: MEDICARE

## 2022-06-06 VITALS
DIASTOLIC BLOOD PRESSURE: 65 MMHG | WEIGHT: 162 LBS | SYSTOLIC BLOOD PRESSURE: 155 MMHG | BODY MASS INDEX: 30.58 KG/M2 | HEIGHT: 61 IN | HEART RATE: 52 BPM

## 2022-06-06 DIAGNOSIS — L03.032 PARONYCHIA OF TOE, LEFT: Primary | ICD-10-CM

## 2022-06-06 PROCEDURE — 99213 OFFICE O/P EST LOW 20 MIN: CPT | Performed by: FAMILY MEDICINE

## 2022-06-06 RX ORDER — CEPHALEXIN 500 MG/1
500 CAPSULE ORAL 2 TIMES DAILY
Qty: 14 CAPSULE | Refills: 0 | Status: SHIPPED | OUTPATIENT
Start: 2022-06-06 | End: 2023-02-14

## 2022-06-06 RX ORDER — ATENOLOL AND CHLORTHALIDONE TABLET 50; 25 MG/1; MG/1
TABLET ORAL
COMMUNITY
Start: 2022-03-10 | End: 2022-09-15

## 2022-06-06 RX ORDER — INSULIN LISPRO 100 [IU]/ML
17 INJECTION, SOLUTION INTRAVENOUS; SUBCUTANEOUS
COMMUNITY
End: 2022-06-06

## 2022-06-06 RX ORDER — PANTOPRAZOLE SODIUM 40 MG/1
40 TABLET, DELAYED RELEASE ORAL DAILY
COMMUNITY
Start: 2022-05-22 | End: 2022-09-12

## 2022-06-06 RX ORDER — INSULIN ASPART 100 [IU]/ML
INJECTION, SOLUTION INTRAVENOUS; SUBCUTANEOUS
COMMUNITY
Start: 2021-08-23 | End: 2022-11-03

## 2022-06-06 RX ORDER — TRAMADOL HYDROCHLORIDE 50 MG/1
TABLET ORAL
COMMUNITY
Start: 2021-08-11 | End: 2024-02-23

## 2022-06-06 RX ORDER — INSULIN GLARGINE 300 U/ML
INJECTION, SOLUTION SUBCUTANEOUS
COMMUNITY
Start: 2022-02-07 | End: 2022-10-10

## 2022-06-06 NOTE — PROGRESS NOTES
Clinical Decision Making:    At the end of the encounter, I discussed results, diagnosis, medications. Discussed red flags for immediate return to clinic/ER, as well as indications for follow up if no improvement. Patient understood and agreed to plan. Patient was stable for discharge.      ICD-10-CM    1. Paronychia of toe, left  L03.032 cephALEXin (KEFLEX) 500 MG capsule     Patient has multiple medication allergies including penicillins, sulfa and macrolides.  She did get hives to penicillins.  She does not recall taking cephalosporins in the past and I do not see it on her medical history at all.  In light of her allergy to sulfa and the fact that 90% of penicillin allergic patients do fine with cephalosporins we did treat her with Keflex 500 mg twice daily for 7 days to treat this infection.  I discussed with her the 10% cross-reactivity and that she should watch closely for hives or other sign of allergy.  She does have Benadryl on hand at home.  If she notices any reaction she should take some Benadryl, stop the Keflex, and follow-up for consideration of another antibiotic.  Continue to soak the toe once or twice daily with Epsom salts.      There are no Patient Instructions on file for this visit.   No follow-ups on file.      chief complaint    HPI:  Yoselyn Doyle is a 76 year old female who presents today complaining of swelling and pain of her left great toe for about a week.  She has been soaking it twice daily with Epson salts and using hydrogen peroxide.  She has noticed some improvement but has ongoing tenderness.  She has not had any drainage.  No injury.  Her blood sugars have been doing fine.  122 this morning    History obtained from chart review and the patient.    Problem List:  2011-12: DM (diabetes mellitus), type 2 with ophthalmic complications   (H)  2006-03: Primary open angle glaucoma  2004-06: Diabetes mellitus, type 2 (H)  2004-06: Coronary atherosclerosis      Past Medical History:  "  Diagnosis Date     Coronary atherosclerosis of unspecified type of vessel, native or graft     Coronary artery disease     Open-angle glaucoma, unspecified(365.10)      Type II or unspecified type diabetes mellitus without mention of complication, not stated as uncontrolled     Diabetes mellitus       Social History     Tobacco Use     Smoking status: Former Smoker     Smokeless tobacco: Never Used     Tobacco comment: 12/00 aprox 30 pack years   Substance Use Topics     Alcohol use: No       Review of systems  negative except listed in HPI    Vitals:    06/06/22 1025   BP: (!) 155/65   Pulse: 52   Weight: 73.5 kg (162 lb)   Height: 1.556 m (5' 1.25\")       Physical Exam  Vitals noted and within normal limits except for elevated systolic blood pressure  In general she is alert, oriented, and in no acute distress  Left foot with normal dorsalis pedis pulse and sensation intact distally  Left great toe with erythema and swelling from the IP joint distally.  Positive tenderness to palpation.  No fluctuance.  Toenail is thick and yellow.          Answers for HPI/ROS submitted by the patient on 6/6/2022  How many servings of fruits and vegetables do you eat daily?: 2-3  On average, how many sweetened beverages do you drink each day (Examples: soda, juice, sweet tea, etc.  Do NOT count diet or artificially sweetened beverages)?: 2  How many minutes a day do you exercise enough to make your heart beat faster?: 9 or less  How many days a week do you exercise enough to make your heart beat faster?: 4  How many days per week do you miss taking your medication?: 1  What is the reason for your visit today?: Foot pain  When did your symptoms begin?: 3-7 days ago      "

## 2022-08-25 ENCOUNTER — TRANSFERRED RECORDS (OUTPATIENT)
Dept: HEALTH INFORMATION MANAGEMENT | Facility: CLINIC | Age: 77
End: 2022-08-25

## 2022-08-25 LAB — RETINOPATHY: POSITIVE

## 2022-09-10 DIAGNOSIS — I10 HYPERTENSIVE DISORDER: ICD-10-CM

## 2022-09-10 DIAGNOSIS — K21.00 GASTROESOPHAGEAL REFLUX DISEASE WITH ESOPHAGITIS: ICD-10-CM

## 2022-09-10 DIAGNOSIS — E78.2 MIXED HYPERLIPIDEMIA: Primary | ICD-10-CM

## 2022-09-12 NOTE — TELEPHONE ENCOUNTER
Routing refill request to provider for review/approval because:  Drug fails the [facility] refill protocol. Please advise.         Last Written Prescription Date: 5/22/22   Last office visit: 6/6/2022 with prescribing provider

## 2022-09-15 RX ORDER — PANTOPRAZOLE SODIUM 40 MG/1
40 TABLET, DELAYED RELEASE ORAL DAILY
Qty: 90 TABLET | Refills: 0 | Status: SHIPPED | OUTPATIENT
Start: 2022-09-15 | End: 2022-11-03

## 2022-09-15 RX ORDER — ATENOLOL AND CHLORTHALIDONE TABLET 50; 25 MG/1; MG/1
TABLET ORAL
Qty: 45 TABLET | Refills: 0 | Status: SHIPPED | OUTPATIENT
Start: 2022-09-15 | End: 2022-11-03

## 2022-10-03 ENCOUNTER — HEALTH MAINTENANCE LETTER (OUTPATIENT)
Age: 77
End: 2022-10-03

## 2022-10-06 DIAGNOSIS — E11.9 TYPE 2 DIABETES MELLITUS (H): Primary | ICD-10-CM

## 2022-10-10 RX ORDER — INSULIN GLARGINE 300 U/ML
INJECTION, SOLUTION SUBCUTANEOUS
Qty: 6 ML | Refills: 0 | Status: SHIPPED | OUTPATIENT
Start: 2022-10-10 | End: 2022-11-03

## 2022-10-10 NOTE — TELEPHONE ENCOUNTER
"TC- please contact patient. 30 day supply of medication sent to pharmacy. Patient is due for DM visit and labs.    Lab Results   Component Value Date    A1C 8.2 02/12/2022    A1C 8.2 08/07/2021    A1C 7.9 07/16/2020    A1C 9.6 02/04/2020    A1C 8.1 09/13/2019    A1C 9.5 08/07/2001    A1C 9.2 05/10/2001     Last Written Prescription Date:  Patient reported  Last office visit provider: 2/18/22    Requested Prescriptions   Pending Prescriptions Disp Refills     TOUJEO MAX SOLOSTAR 300 UNIT/ML (2 units dial) pen [Pharmacy Med Name: Toujeo Max SoloStar 300 UNIT/ML Subcutaneous Solution Pen-injector] 12 mL 0     Sig: INJECT 36 UNITS SUBCUTANEOUSLY ONCE DAILY       Long Acting Insulin Protocol Failed - 10/6/2022 11:49 AM        Failed - HgbA1C in past 3 or 6 months     If HgbA1C is 8 or greater, it needs to be on file within the past 3 months.  If less than 8, must be on file within the past 6 months.     Recent Labs   Lab Test 02/12/22  0943   A1C 8.2*             Passed - Serum creatinine on file in past 12 months     Recent Labs   Lab Test 02/12/22  0943   CR 1.78*       Ok to refill medication if creatinine is low          Passed - Medication is active on med list        Passed - Patient is age 18 or older        Passed - Recent (6 mo) or future (30 days) visit within the authorizing provider's specialty     Patient had office visit in the last 6 months or has a visit in the next 30 days with authorizing provider or within the authorizing provider's specialty.  See \"Patient Info\" tab in inbasket, or \"Choose Columns\" in Meds & Orders section of the refill encounter.                 VIJAYA JOSEPH RN 10/10/22 8:15 AM    "

## 2022-10-21 ENCOUNTER — LAB (OUTPATIENT)
Dept: LAB | Facility: CLINIC | Age: 77
End: 2022-10-21
Payer: MEDICARE

## 2022-10-21 DIAGNOSIS — I25.10 CORONARY ATHEROSCLEROSIS: ICD-10-CM

## 2022-10-21 DIAGNOSIS — Z11.59 NEED FOR HEPATITIS C SCREENING TEST: ICD-10-CM

## 2022-10-21 DIAGNOSIS — Z13.220 SCREENING FOR HYPERLIPIDEMIA: ICD-10-CM

## 2022-10-21 DIAGNOSIS — E11.39 DM (DIABETES MELLITUS), TYPE 2 WITH OPHTHALMIC COMPLICATIONS (H): ICD-10-CM

## 2022-10-21 LAB
CHOLEST SERPL-MCNC: 170 MG/DL
CREAT UR-MCNC: 113 MG/DL
HBA1C MFR BLD: 8.1 % (ref 0–5.6)
HCV AB SERPL QL IA: NONREACTIVE
HDLC SERPL-MCNC: 65 MG/DL
LDLC SERPL CALC-MCNC: 85 MG/DL
MICROALBUMIN UR-MCNC: 37.2 MG/L
MICROALBUMIN/CREAT UR: 32.92 MG/G CR (ref 0–25)
NONHDLC SERPL-MCNC: 105 MG/DL
TRIGL SERPL-MCNC: 101 MG/DL

## 2022-10-21 PROCEDURE — 36415 COLL VENOUS BLD VENIPUNCTURE: CPT

## 2022-10-21 PROCEDURE — 80061 LIPID PANEL: CPT

## 2022-10-21 PROCEDURE — 86803 HEPATITIS C AB TEST: CPT

## 2022-10-21 PROCEDURE — 82043 UR ALBUMIN QUANTITATIVE: CPT

## 2022-10-21 PROCEDURE — 83036 HEMOGLOBIN GLYCOSYLATED A1C: CPT

## 2022-11-03 ENCOUNTER — OFFICE VISIT (OUTPATIENT)
Dept: FAMILY MEDICINE | Facility: CLINIC | Age: 77
End: 2022-11-03
Payer: MEDICARE

## 2022-11-03 VITALS
DIASTOLIC BLOOD PRESSURE: 79 MMHG | HEART RATE: 98 BPM | WEIGHT: 163 LBS | BODY MASS INDEX: 30.78 KG/M2 | SYSTOLIC BLOOD PRESSURE: 148 MMHG | HEIGHT: 61 IN

## 2022-11-03 DIAGNOSIS — Z00.00 HEALTH CARE MAINTENANCE: ICD-10-CM

## 2022-11-03 DIAGNOSIS — N18.32 STAGE 3B CHRONIC KIDNEY DISEASE (H): ICD-10-CM

## 2022-11-03 DIAGNOSIS — Z79.4 TYPE 2 DIABETES MELLITUS WITH BOTH EYES AFFECTED BY MILD NONPROLIFERATIVE RETINOPATHY WITHOUT MACULAR EDEMA, WITH LONG-TERM CURRENT USE OF INSULIN (H): ICD-10-CM

## 2022-11-03 DIAGNOSIS — I10 PRIMARY HYPERTENSION: ICD-10-CM

## 2022-11-03 DIAGNOSIS — K21.00 GASTROESOPHAGEAL REFLUX DISEASE WITH ESOPHAGITIS WITHOUT HEMORRHAGE: Primary | ICD-10-CM

## 2022-11-03 DIAGNOSIS — K21.9 GASTROESOPHAGEAL REFLUX DISEASE WITHOUT ESOPHAGITIS: ICD-10-CM

## 2022-11-03 DIAGNOSIS — I25.10 ATHEROSCLEROSIS OF NATIVE CORONARY ARTERY OF NATIVE HEART WITHOUT ANGINA PECTORIS: ICD-10-CM

## 2022-11-03 DIAGNOSIS — E11.3293 TYPE 2 DIABETES MELLITUS WITH MILD NONPROLIFERATIVE RETINOPATHY OF BOTH EYES, WITH LONG-TERM CURRENT USE OF INSULIN, MACULAR EDEMA PRESENCE UNSPECIFIED (H): ICD-10-CM

## 2022-11-03 DIAGNOSIS — Z23 NEED FOR VACCINATION: ICD-10-CM

## 2022-11-03 DIAGNOSIS — E11.3293 TYPE 2 DIABETES MELLITUS WITH BOTH EYES AFFECTED BY MILD NONPROLIFERATIVE RETINOPATHY WITHOUT MACULAR EDEMA, WITH LONG-TERM CURRENT USE OF INSULIN (H): ICD-10-CM

## 2022-11-03 DIAGNOSIS — Z79.4 TYPE 2 DIABETES MELLITUS WITH MILD NONPROLIFERATIVE RETINOPATHY OF BOTH EYES, WITH LONG-TERM CURRENT USE OF INSULIN, MACULAR EDEMA PRESENCE UNSPECIFIED (H): ICD-10-CM

## 2022-11-03 DIAGNOSIS — E78.2 MIXED HYPERLIPIDEMIA: ICD-10-CM

## 2022-11-03 PROBLEM — N18.31 CHRONIC KIDNEY DISEASE, STAGE 3A (H): Status: ACTIVE | Noted: 2022-11-03

## 2022-11-03 PROBLEM — E66.9 OBESITY: Status: ACTIVE | Noted: 2022-11-03

## 2022-11-03 PROCEDURE — 99214 OFFICE O/P EST MOD 30 MIN: CPT | Performed by: INTERNAL MEDICINE

## 2022-11-03 RX ORDER — ATENOLOL AND CHLORTHALIDONE TABLET 50; 25 MG/1; MG/1
1 TABLET ORAL DAILY
Qty: 90 TABLET | Refills: 3 | Status: SHIPPED | OUTPATIENT
Start: 2022-11-03 | End: 2023-12-29 | Stop reason: ALTCHOICE

## 2022-11-03 RX ORDER — ATORVASTATIN CALCIUM 40 MG/1
40 TABLET, FILM COATED ORAL DAILY
Qty: 90 TABLET | Refills: 3 | Status: SHIPPED | OUTPATIENT
Start: 2022-11-03 | End: 2023-12-29

## 2022-11-03 RX ORDER — ATENOLOL AND CHLORTHALIDONE TABLET 50; 25 MG/1; MG/1
TABLET ORAL
Qty: 45 TABLET | Refills: 0 | Status: CANCELLED | OUTPATIENT
Start: 2022-11-03

## 2022-11-03 RX ORDER — PANTOPRAZOLE SODIUM 40 MG/1
40 TABLET, DELAYED RELEASE ORAL DAILY
Qty: 90 TABLET | Refills: 3 | Status: SHIPPED | OUTPATIENT
Start: 2022-11-03 | End: 2023-10-25

## 2022-11-03 RX ORDER — AMLODIPINE BESYLATE 5 MG/1
5 TABLET ORAL DAILY
Qty: 90 TABLET | Refills: 3 | Status: SHIPPED | OUTPATIENT
Start: 2022-11-03 | End: 2023-12-29

## 2022-11-03 RX ORDER — INSULIN ASPART 100 [IU]/ML
INJECTION, SOLUTION INTRAVENOUS; SUBCUTANEOUS
Qty: 45 ML | Refills: 3 | Status: SHIPPED | OUTPATIENT
Start: 2022-11-03 | End: 2022-11-04

## 2022-11-03 RX ORDER — INSULIN GLARGINE 300 U/ML
INJECTION, SOLUTION SUBCUTANEOUS
Qty: 6 ML | Refills: 0 | Status: SHIPPED | OUTPATIENT
Start: 2022-11-03 | End: 2023-04-19

## 2022-11-03 NOTE — ASSESSMENT & PLAN NOTE
- Lexiscan (12/2016) - no reversible defects   - previously intolerant to higher dose ISMN, she does have known atherostenotic disease involving LCx   - ASA, atenolol  daily, atorvastatin 40mg QHS,    - lower threshold for PCI given historic intolerance to long-acting nitrates

## 2022-11-03 NOTE — PROGRESS NOTES
Assessment & Plan   Problem List Items Addressed This Visit        Digestive    Gastroesophageal reflux disease without esophagitis     GERD/h/o esophagitis   - EGD in 12/2016 showing gastric ulcer - previous symptoms now resolved   - pantoprazole 40mg daily   - denies any NSAIDs         Relevant Medications    pantoprazole (PROTONIX) 40 MG EC tablet       Endocrine    Type 2 diabetes mellitus with both eyes affected by mild nonproliferative retinopathy without macular edema, with long-term current use of insulin (H)     controlled   hypoglycemic medications: intolerant to metformin due to GI symptoms  insulin therapy: Toujeo 30 units, Humalog 12-15 units TID + SSI   - ICR 1:3, sensitivity 10 (~20 units q meal)   - using CGMS/Freestyle Adry and seeing significant benefits from use  last HbA1c: 8.2%   microvascular complications: proliferative retinopathy - sees ophtho regularly, + microalbuminuria  macrovascular complications: CAD  ASA/GILDA/statin:  ASA 325mg daily,  atorvastatin 40mg    - previous myalgias on higher dose of statin   - angioedema development on lisinopril and irbesartan    - avoidance of classes for now     Patient has met criteria for CGM coverage;   - patient has type 2 diabetes mellitus; and  - patient has been using a blood glucose monitor and performing four or more blood glucose test per day; and  - patient is insulin treated with multiple daily injections of three or more times per day; and  - within six months prior to ordering the CGM, the patient has had an in-person visit with the practitioner; and determined that criteria (1-4) above are met; and   - every six months following the initial prescription of the CGM, the treating practitioner has an in-person visit with the patient to assess adherence to their CGM regimen and diabetes treatment plan          Relevant Medications    amLODIPine (NORVASC) 5 MG tablet    insulin glargine U-300 (TOUJEO MAX SOLOSTAR) 300 UNIT/ML (2 units dial)  "pen    NOVOLOG FLEXPEN 100 UNIT/ML soln    atenolol-chlorthalidone (TENORETIC) 50-25 MG tablet    atorvastatin (LIPITOR) 40 MG tablet    Mixed hyperlipidemia    Relevant Medications    atorvastatin (LIPITOR) 40 MG tablet       Circulatory    Atherosclerosis of native coronary artery of native heart without angina pectoris     - Lexiscan (12/2016) - no reversible defects   - previously intolerant to higher dose ISMN, she does have known atherostenotic disease involving LCx   - ASA, atenolol  daily, atorvastatin 40mg QHS,    - lower threshold for PCI given historic intolerance to long-acting nitrates         Primary hypertension     uncontrolled  current antihypertensive regimen: atenolol/chlorthalidone 25/12.5mg, amlodipine 5mg,   regimen changes: increase atenolol/chlorthalidone to 50/25mg daily  Intolerance: ACEi/ARB - angioedema hx  future titration/work-up plan:          Relevant Medications    atenolol-chlorthalidone (TENORETIC) 50-25 MG tablet       Urinary    Stage 3b chronic kidney disease (H)     Baseline SCr 1.5-1.7            Other    Health care maintenance      - declines any means of CRC screening   - DEXA '18: osteopenia   - prior DEE   - q2 yr mammography   - completed original COVID series - declines booster  - historically declines influenza   - declining health shared about         Other Visit Diagnoses     Gastroesophageal reflux disease with esophagitis without hemorrhage    -  Primary    Relevant Medications    pantoprazole (PROTONIX) 40 MG EC tablet    Need for vaccination                    BMI:   Estimated body mass index is 30.55 kg/m  as calculated from the following:    Height as of this encounter: 1.556 m (5' 1.25\").    Weight as of this encounter: 73.9 kg (163 lb).     Blood sugar testing frequency justification:  Adjustment of medication(s) and Risk of hypoglycemia with medication(s)      Return in about 6 months (around 5/3/2023) for Follow up, with me.    Chicho Luong MD  M " "[facility]    Renae Topete is a 77 year old, presenting for the following health issues: Presents for regular follow-up.  Overall doing okay.  No significant health issues.  Seen 1 time in urgent care for paronychia of great toe responded well to antibiotics.  Stable glycemic control.  He is using freestyle emily.  Shares currently taking Toujeo 32 units nightly though sees morning sugars typically in 70s to 80s.  Wonder if she needs reduced dosing further.  Diabetes (Refill and review labs done 10/21/2022.)      History of Present Illness       Diabetes:   She presents for follow up of diabetes.  She is checking home blood glucose four or more times daily. She checks blood glucose before meals and at bedtime.  Blood glucose is sometimes over 200 and sometimes under 70. She is aware of hypoglycemia symptoms including shakiness, weakness and lethargy. She has no concerns regarding her diabetes at this time.  She is having numbness in feet and blurry vision.         She eats 2-3 servings of fruits and vegetables daily.She consumes 2 sweetened beverage(s) daily.She exercises with enough effort to increase her heart rate 9 or less minutes per day.  She exercises with enough effort to increase her heart rate 4 days per week. She is missing 1 dose(s) of medications per week.       Review of Systems   Constitutional, HEENT, cardiovascular, pulmonary, gi and gu systems are negative, except as otherwise noted.      Objective    BP (!) 148/79 (BP Location: Right arm, Patient Position: Sitting, Cuff Size: Adult Large)   Pulse 98   Ht 1.556 m (5' 1.25\")   Wt 73.9 kg (163 lb)   BMI 30.55 kg/m    Body mass index is 30.55 kg/m .  Physical Exam   GENERAL: healthy, alert and no distress  NECK: no adenopathy, no asymmetry, masses, or scars and thyroid normal to palpation  RESP: lungs clear to auscultation - no rales, rhonchi or wheezes  CV: regular rate and rhythm, normal S1 S2, no S3 or S4, no " murmur, click or rub, no peripheral edema and peripheral pulses strong  ABDOMEN: soft, nontender, no hepatosplenomegaly, no masses and bowel sounds normal  MS: no gross musculoskeletal defects noted, no edema  Feet: onychomycosis of great toenail    Lab on 10/21/2022   Component Date Value Ref Range Status     Hepatitis C Antibody 10/21/2022 Nonreactive  Nonreactive Final     Cholesterol 10/21/2022 170  <200 mg/dL Final     Triglycerides 10/21/2022 101  <150 mg/dL Final     Direct Measure HDL 10/21/2022 65  >=50 mg/dL Final     LDL Cholesterol Calculated 10/21/2022 85  <=100 mg/dL Final     Non HDL Cholesterol 10/21/2022 105  <130 mg/dL Final     Albumin Urine mg/L 10/21/2022 37.2  mg/L Final    The reference ranges have not been established in urine albumin. The results should be integrated into the clinical context for interpretation.     Albumin Urine mg/g Cr 10/21/2022 32.92 (H)  0.00 - 25.00 mg/g Cr Final    Microalbuminuria is defined as an albumin:creatinine ratio of 17 to 299 for males and 25 to 299 for females. A ratio of albumin:creatinine of 300 or higher is indicative of overt proteinuria.  Due to biologic variability, positive results should be confirmed by a second, first-morning random or 24-hour timed urine specimen. If there is discrepancy, a third specimen is recommended. When 2 out of 3 results are in the microalbuminuria range, this is evidence for incipient nephropathy and warrants increased efforts at glucose control, blood pressure control, and institution of therapy with an angiotensin-converting-enzyme (ACE) inhibitor (if the patient can tolerate it).       Creatinine Urine mg/dL 10/21/2022 113.0  mg/dL Final    The reference ranges have not been established in urine creatinine. The results should be integrated into the clinical context for interpretation.     Hemoglobin A1C 10/21/2022 8.1 (H)  0.0 - 5.6 % Final    Normal <5.7%   Prediabetes 5.7-6.4%    Diabetes 6.5% or higher     Note:  Adopted from ADA consensus guidelines.       Current Outpatient Medications   Medication     amLODIPine (NORVASC) 5 MG tablet     ASPIRIN 325 MG OR TBEC     atenolol-chlorthalidone (TENORETIC) 50-25 MG tablet     atorvastatin (LIPITOR) 40 MG tablet     insulin glargine U-300 (TOUJEO MAX SOLOSTAR) 300 UNIT/ML (2 units dial) pen     Nitroglycerin (NITROSTAT SL)     NOVOLOG FLEXPEN 100 UNIT/ML soln     pantoprazole (PROTONIX) 40 MG EC tablet     traMADol (ULTRAM) 50 MG tablet     No current facility-administered medications for this visit.

## 2022-11-03 NOTE — ASSESSMENT & PLAN NOTE
- declines any means of CRC screening   - ROSARIO '18: osteopenia   - prior DEE   - q2 yr mammography   - completed original COVID series - declines booster  - historically declines influenza   - declining health shared about

## 2022-11-03 NOTE — PATIENT INSTRUCTIONS
Increasing your atenolol/chlorthalidone to a whole pill (50/25mg daily)  No other med changes - please verify that our list matches yours

## 2022-11-03 NOTE — ASSESSMENT & PLAN NOTE
GERD/h/o esophagitis   - EGD in 12/2016 showing gastric ulcer - previous symptoms now resolved   - pantoprazole 40mg daily   - denies any NSAIDs

## 2022-11-03 NOTE — ASSESSMENT & PLAN NOTE
controlled   hypoglycemic medications: intolerant to metformin due to GI symptoms  insulin therapy: Toujeo 30 units, Humalog 12-15 units TID + SSI   - ICR 1:3, sensitivity 10 (~20 units q meal)   - using CGMS/Freestyle Adry and seeing significant benefits from use  last HbA1c: 8.2%   microvascular complications: proliferative retinopathy - sees ophtho regularly, + microalbuminuria  macrovascular complications: CAD  ASA/GILDA/statin:  ASA 325mg daily,  atorvastatin 40mg    - previous myalgias on higher dose of statin   - angioedema development on lisinopril and irbesartan    - avoidance of classes for now     Patient has met criteria for CGM coverage;   - patient has type 2 diabetes mellitus; and  - patient has been using a blood glucose monitor and performing four or more blood glucose test per day; and  - patient is insulin treated with multiple daily injections of three or more times per day; and  - within six months prior to ordering the CGM, the patient has had an in-person visit with the practitioner; and determined that criteria (1-4) above are met; and   - every six months following the initial prescription of the CGM, the treating practitioner has an in-person visit with the patient to assess adherence to their CGM regimen and diabetes treatment plan

## 2022-11-03 NOTE — ASSESSMENT & PLAN NOTE
uncontrolled  current antihypertensive regimen: atenolol/chlorthalidone 25/12.5mg, amlodipine 5mg,   regimen changes: increase atenolol/chlorthalidone to 50/25mg daily  Intolerance: ACEi/ARB - angioedema hx  future titration/work-up plan:

## 2022-11-04 ENCOUNTER — APPOINTMENT (OUTPATIENT)
Dept: AUDIOLOGY | Facility: CLINIC | Age: 77
End: 2022-11-04
Payer: MEDICARE

## 2022-11-04 DIAGNOSIS — E11.3293 TYPE 2 DIABETES MELLITUS WITH MILD NONPROLIFERATIVE RETINOPATHY OF BOTH EYES, WITH LONG-TERM CURRENT USE OF INSULIN, MACULAR EDEMA PRESENCE UNSPECIFIED (H): ICD-10-CM

## 2022-11-04 DIAGNOSIS — Z79.4 TYPE 2 DIABETES MELLITUS WITH MILD NONPROLIFERATIVE RETINOPATHY OF BOTH EYES, WITH LONG-TERM CURRENT USE OF INSULIN, MACULAR EDEMA PRESENCE UNSPECIFIED (H): ICD-10-CM

## 2022-11-04 RX ORDER — INSULIN ASPART 100 [IU]/ML
INJECTION, SOLUTION INTRAVENOUS; SUBCUTANEOUS
Qty: 45 ML | Refills: 3 | Status: SHIPPED | OUTPATIENT
Start: 2022-11-04 | End: 2023-04-26

## 2022-11-04 NOTE — TELEPHONE ENCOUNTER
Pharmacy requesting instruction for [employer] Flex pen be written with further clarification as to dosing.    Please specify how many units per each meal.    Current prescription is for 40 units daily, divided throughout day with meals.

## 2022-11-30 ENCOUNTER — MEDICAL CORRESPONDENCE (OUTPATIENT)
Dept: HEALTH INFORMATION MANAGEMENT | Facility: CLINIC | Age: 77
End: 2022-11-30

## 2023-01-05 ENCOUNTER — TELEPHONE (OUTPATIENT)
Dept: FAMILY MEDICINE | Facility: CLINIC | Age: 78
End: 2023-01-05

## 2023-01-05 NOTE — TELEPHONE ENCOUNTER
1-5-23    Reason for Call:  Appointment Request    Patient requesting this type of appt:  Office visit    Requested provider: eduardo     Reason patient unable to be scheduled: Not within requested timeframe    When does patient want to be seen/preferred time: 1-2 days    Comments: pt called & is scheduled 1-17 w/ Charissa Pereira for broken left hearing aid.  Per pt her hearing aid broke around 12-26-22, pt advises the wires are connected & she put new batteries in & she still cant hear out of it.  Pt doesn't want to wait till 1-17 for her appt, requesting to come in sooner    Could we send this information to you in [place] or would you prefer to receive a phone call?:   Patient would prefer a phone call   Okay to leave a detailed message?: Yes at Home number on file [phone] (home)    Call taken on 1/5/2023 at 11:03 AM by Cherrie Hennessy

## 2023-01-10 ENCOUNTER — APPOINTMENT (OUTPATIENT)
Dept: AUDIOLOGY | Facility: CLINIC | Age: 78
End: 2023-01-10
Payer: MEDICARE

## 2023-02-11 DIAGNOSIS — L03.032 PARONYCHIA OF TOE, LEFT: ICD-10-CM

## 2023-02-13 NOTE — TELEPHONE ENCOUNTER
Last office visit: 11/3/2022     Future Appointments 2/13/2023 - 8/12/2023      Date Visit Type Length Department Provider     5/9/2023  1:00 PM RETURN 30 min [facility] AUDIOLOGY Lian Pereira AuD    Location Instructions:     [address] [place] [address]                 Paronychia of toe, left  Requested Prescriptions   Pending Prescriptions Disp Refills     cephALEXin (KEFLEX) 500 MG capsule [Pharmacy Med Name: Cephalexin 500 MG Oral Capsule] 14 capsule 0     Sig: Take 1 capsule by mouth twice daily for 7 days       There is no refill protocol information for this order

## 2023-02-14 RX ORDER — CEPHALEXIN 500 MG/1
CAPSULE ORAL
Qty: 14 CAPSULE | Refills: 0 | Status: SHIPPED | OUTPATIENT
Start: 2023-02-14 | End: 2023-06-23

## 2023-05-09 ENCOUNTER — APPOINTMENT (OUTPATIENT)
Dept: AUDIOLOGY | Facility: CLINIC | Age: 78
End: 2023-05-09
Payer: MEDICARE

## 2023-05-20 ENCOUNTER — HEALTH MAINTENANCE LETTER (OUTPATIENT)
Age: 78
End: 2023-05-20

## 2023-06-15 ENCOUNTER — LAB (OUTPATIENT)
Dept: LAB | Facility: CLINIC | Age: 78
End: 2023-06-15
Payer: MEDICARE

## 2023-06-15 DIAGNOSIS — Z79.4 TYPE 2 DIABETES MELLITUS WITH BOTH EYES AFFECTED BY MILD NONPROLIFERATIVE RETINOPATHY WITHOUT MACULAR EDEMA, WITH LONG-TERM CURRENT USE OF INSULIN (H): ICD-10-CM

## 2023-06-15 DIAGNOSIS — E11.3293 TYPE 2 DIABETES MELLITUS WITH BOTH EYES AFFECTED BY MILD NONPROLIFERATIVE RETINOPATHY WITHOUT MACULAR EDEMA, WITH LONG-TERM CURRENT USE OF INSULIN (H): ICD-10-CM

## 2023-06-15 DIAGNOSIS — N18.32 STAGE 3B CHRONIC KIDNEY DISEASE (H): ICD-10-CM

## 2023-06-15 LAB
ALP SERPL-CCNC: 67 U/L (ref 35–104)
ANION GAP SERPL CALCULATED.3IONS-SCNC: 12 MMOL/L (ref 7–15)
BUN SERPL-MCNC: 26.7 MG/DL (ref 8–23)
CALCIUM SERPL-MCNC: 10.1 MG/DL (ref 8.8–10.2)
CHLORIDE SERPL-SCNC: 108 MMOL/L (ref 98–107)
CREAT SERPL-MCNC: 1.63 MG/DL (ref 0.51–0.95)
DEPRECATED HCO3 PLAS-SCNC: 23 MMOL/L (ref 22–29)
GFR SERPL CREATININE-BSD FRML MDRD: 32 ML/MIN/1.73M2
GLUCOSE SERPL-MCNC: 106 MG/DL (ref 70–99)
HBA1C MFR BLD: 7.9 % (ref 0–5.6)
HGB BLD-MCNC: 12 G/DL (ref 11.7–15.7)
PHOSPHATE SERPL-MCNC: 3.9 MG/DL (ref 2.5–4.5)
POTASSIUM SERPL-SCNC: 4.6 MMOL/L (ref 3.4–5.3)
PTH-INTACT SERPL-MCNC: 62 PG/ML (ref 15–65)
SODIUM SERPL-SCNC: 143 MMOL/L (ref 136–145)

## 2023-06-15 PROCEDURE — 85018 HEMOGLOBIN: CPT | Mod: QW

## 2023-06-15 PROCEDURE — 84075 ASSAY ALKALINE PHOSPHATASE: CPT

## 2023-06-15 PROCEDURE — 83970 ASSAY OF PARATHORMONE: CPT

## 2023-06-15 PROCEDURE — 36415 COLL VENOUS BLD VENIPUNCTURE: CPT

## 2023-06-15 PROCEDURE — 84100 ASSAY OF PHOSPHORUS: CPT

## 2023-06-15 PROCEDURE — 83036 HEMOGLOBIN GLYCOSYLATED A1C: CPT

## 2023-06-15 PROCEDURE — 80048 BASIC METABOLIC PNL TOTAL CA: CPT

## 2023-06-23 ENCOUNTER — OFFICE VISIT (OUTPATIENT)
Dept: FAMILY MEDICINE | Facility: CLINIC | Age: 78
End: 2023-06-23
Payer: MEDICARE

## 2023-06-23 VITALS
SYSTOLIC BLOOD PRESSURE: 144 MMHG | HEIGHT: 61 IN | OXYGEN SATURATION: 99 % | TEMPERATURE: 97.8 F | RESPIRATION RATE: 17 BRPM | WEIGHT: 162 LBS | HEART RATE: 52 BPM | DIASTOLIC BLOOD PRESSURE: 79 MMHG | BODY MASS INDEX: 30.58 KG/M2

## 2023-06-23 DIAGNOSIS — E11.3293 TYPE 2 DIABETES MELLITUS WITH BOTH EYES AFFECTED BY MILD NONPROLIFERATIVE RETINOPATHY WITHOUT MACULAR EDEMA, WITH LONG-TERM CURRENT USE OF INSULIN (H): ICD-10-CM

## 2023-06-23 DIAGNOSIS — N18.32 STAGE 3B CHRONIC KIDNEY DISEASE (H): ICD-10-CM

## 2023-06-23 DIAGNOSIS — Z00.00 HEALTH CARE MAINTENANCE: ICD-10-CM

## 2023-06-23 DIAGNOSIS — I25.10 ATHEROSCLEROSIS OF NATIVE CORONARY ARTERY OF NATIVE HEART WITHOUT ANGINA PECTORIS: ICD-10-CM

## 2023-06-23 DIAGNOSIS — E78.2 MIXED HYPERLIPIDEMIA: Primary | ICD-10-CM

## 2023-06-23 DIAGNOSIS — K21.9 GASTROESOPHAGEAL REFLUX DISEASE WITHOUT ESOPHAGITIS: ICD-10-CM

## 2023-06-23 DIAGNOSIS — Z79.4 TYPE 2 DIABETES MELLITUS WITH BOTH EYES AFFECTED BY MILD NONPROLIFERATIVE RETINOPATHY WITHOUT MACULAR EDEMA, WITH LONG-TERM CURRENT USE OF INSULIN (H): ICD-10-CM

## 2023-06-23 DIAGNOSIS — I10 PRIMARY HYPERTENSION: ICD-10-CM

## 2023-06-23 PROCEDURE — 99214 OFFICE O/P EST MOD 30 MIN: CPT | Performed by: INTERNAL MEDICINE

## 2023-06-23 ASSESSMENT — ENCOUNTER SYMPTOMS: LEG PAIN: 1

## 2023-06-23 NOTE — ASSESSMENT & PLAN NOTE
6/2023: Myalgia complaints that I suspect are likely statin related related  -Recommending that she stop of atorvastatin for a 1 month trial  -Advised to contact me at that time for updates regarding any interim symptom changes.  Historically has had myalgia complaints of other statins.  Could consider transition to rosuvastatin as alternative

## 2023-06-23 NOTE — PATIENT INSTRUCTIONS
Related to your muscle complaints - stop taking atorvastatin for 1 month.  Call/write me in 1 month with updates on symptoms.  We may change/stop your statin based on your experience.    Recommending flu and COVID booster when available this fall (probably will start in September)

## 2023-06-23 NOTE — ASSESSMENT & PLAN NOTE
uncontrolled  current antihypertensive regimen: atenolol/chlorthalidone 50/25mg, amlodipine 5mg,   regimen changes: none  Intolerance: ACEi/ARB - angioedema hx  future titration/work-up plan:

## 2023-06-23 NOTE — PROGRESS NOTES
Assessment & Plan   Problem List Items Addressed This Visit        Digestive    Gastroesophageal reflux disease without esophagitis     GERD/h/o esophagitis   - EGD in 12/2016 showing gastric ulcer - previous symptoms now resolved   - pantoprazole 40mg daily   - denies any NSAIDs            Endocrine    Type 2 diabetes mellitus with both eyes affected by mild nonproliferative retinopathy without macular edema, with long-term current use of insulin (H)     controlled   hypoglycemic medications: intolerant to metformin due to GI symptoms  insulin therapy: Toujeo 30 units, Humalog 12-15 units TID + SSI   - ICR 1:3, sensitivity 10 (~20 units q meal)   - using CGMS/Freestyle Adry and seeing significant benefits from use  last HbA1c: 7.9%   microvascular complications: proliferative retinopathy - sees ophtho regularly, + microalbuminuria  macrovascular complications: CAD  ASA/GILDA/statin:  ASA 325mg daily,  atorvastatin 40mg    - previous myalgias on higher dose of statin   - angioedema development on lisinopril and irbesartan    - avoidance of classes for now     Patient has met criteria for CGM coverage;   - patient has type 2 diabetes mellitus; and  - patient has been using a blood glucose monitor and performing four or more blood glucose test per day; and  - patient is insulin treated with multiple daily injections of three or more times per day; and  - within six months prior to ordering the CGM, the patient has had an in-person visit with the practitioner; and determined that criteria (1-4) above are met; and   - every six months following the initial prescription of the CGM, the treating practitioner has an in-person visit with the patient to assess adherence to their CGM regimen and diabetes treatment plan          Mixed hyperlipidemia - Primary     6/2023: Myalgia complaints that I suspect are likely statin related related  -Recommending that she stop of atorvastatin for a 1 month trial  -Advised to contact me  "at that time for updates regarding any interim symptom changes.  Historically has had myalgia complaints of other statins.  Could consider transition to rosuvastatin as alternative            Circulatory    Atherosclerosis of native coronary artery of native heart without angina pectoris     - Lexiscan (12/2016) - no reversible defects   - previously intolerant to higher dose ISMN, she does have known atherostenotic disease involving LCx   - ASA, atenolol  daily, atorvastatin 40mg QHS,    - lower threshold for PCI given historic intolerance to long-acting nitrates         Primary hypertension     uncontrolled  current antihypertensive regimen: atenolol/chlorthalidone 50/25mg, amlodipine 5mg,   regimen changes: none  Intolerance: ACEi/ARB - angioedema hx  future titration/work-up plan:             Urinary    Stage 3b chronic kidney disease (H)     Baseline SCr 1.5-1.7            Other    Health care maintenance      - declines any means of CRC screening   - DEXA '18: osteopenia   - prior DEE   - q2 yr mammography   - completed original COVID series - declines booster  - historically declines influenza   - declining health shared about                   BMI:   Estimated body mass index is 30.36 kg/m  as calculated from the following:    Height as of this encounter: 1.556 m (5' 1.25\").    Weight as of this encounter: 73.5 kg (162 lb).     Blood sugar testing frequency justification:  Uncontrolled diabetes, Adjustment of medication(s) and Risk of hypoglycemia with medication(s)      Chicho Luong MD  [facility] [place]    Renae Topete is a 77 year old, presenting for the following health issues: Presents for general follow-up.  Complaints of diffuse myalgias, primarily in the thighs, significantly bothers her with walking.  Becoming more common over the last few months.  Denies any numbness or tingling.  Mainly legs somewhat and arms.  No problems since increasing her atenolol " "chlorthalidone.  Still using freestyle emily and finds it very helpful.  Denies any hypoglycemic complications.  Kidney Problem, Hypertension, Diabetes, and Leg Pain (Left leg pain.)        6/23/2023    11:41 AM   Additional Questions   Roomed by Nilam HUI     Kidney Problem  This is a chronic problem. The current episode started more than 1 year ago. The problem has been unchanged.   Leg Pain    History of Present Illness       Diabetes:   She presents for follow up of diabetes.  She is checking home blood glucose four or more times daily. She checks blood glucose before and after meals.  Blood glucose is sometimes over 200 and sometimes under 70. She is aware of hypoglycemia symptoms including shakiness. She has no concerns regarding her diabetes at this time.  She is having numbness in feet.         She eats 2-3 servings of fruits and vegetables daily.She consumes 2 sweetened beverage(s) daily.She exercises with enough effort to increase her heart rate 10 to 19 minutes per day.  She exercises with enough effort to increase her heart rate 3 or less days per week. She is missing 2 dose(s) of medications per week.         Review of Systems   Constitutional, HEENT, cardiovascular, pulmonary, gi and gu systems are negative, except as otherwise noted.      Objective    BP (!) 149/84   Pulse 52   Temp 97.8  F (36.6  C)   Resp 17   Ht 1.556 m (5' 1.25\")   Wt 73.5 kg (162 lb)   LMP  (LMP Unknown)   SpO2 99%   BMI 30.36 kg/m    Body mass index is 30.36 kg/m .  Physical Exam   GENERAL: healthy, alert and no distress  NECK: no adenopathy, no asymmetry, masses, or scars and thyroid normal to palpation  RESP: lungs clear to auscultation - no rales, rhonchi or wheezes  CV: regular rate and rhythm, normal S1 S2, no S3 or S4, no murmur, click or rub, no peripheral edema and peripheral pulses strong  ABDOMEN: soft, nontender, no hepatosplenomegaly, no masses and bowel sounds normal  MS: no gross musculoskeletal defects " noted, no edema.  Focal pains to palpation within quadriceps and calf muscles.      Lab on 06/15/2023   Component Date Value Ref Range Status     Parathyroid Hormone Intact 06/15/2023 62  15 - 65 pg/mL Final     Phosphorus 06/15/2023 3.9  2.5 - 4.5 mg/dL Final     Alkaline Phosphatase 06/15/2023 67  35 - 104 U/L Final     Sodium 06/15/2023 143  136 - 145 mmol/L Final     Potassium 06/15/2023 4.6  3.4 - 5.3 mmol/L Final     Chloride 06/15/2023 108 (H)  98 - 107 mmol/L Final     Carbon Dioxide (CO2) 06/15/2023 23  22 - 29 mmol/L Final     Anion Gap 06/15/2023 12  7 - 15 mmol/L Final     Urea Nitrogen 06/15/2023 26.7 (H)  8.0 - 23.0 mg/dL Final     Creatinine 06/15/2023 1.63 (H)  0.51 - 0.95 mg/dL Final     Calcium 06/15/2023 10.1  8.8 - 10.2 mg/dL Final     Glucose 06/15/2023 106 (H)  70 - 99 mg/dL Final     GFR Estimate 06/15/2023 32 (L)  >60 mL/min/1.73m2 Final    eGFR calculated using 2021 CKD-EPI equation.     Hemoglobin A1C 06/15/2023 7.9 (H)  0.0 - 5.6 % Final    Normal <5.7%   Prediabetes 5.7-6.4%    Diabetes 6.5% or higher     Note: Adopted from ADA consensus guidelines.     Hemoglobin 06/15/2023 12.0  11.7 - 15.7 g/dL Final

## 2023-06-23 NOTE — ASSESSMENT & PLAN NOTE
controlled   hypoglycemic medications: intolerant to metformin due to GI symptoms  insulin therapy: Toujeo 30 units, Humalog 12-15 units TID + SSI   - ICR 1:3, sensitivity 10 (~20 units q meal)   - using CGMS/Freestyle Adry and seeing significant benefits from use  last HbA1c: 7.9%   microvascular complications: proliferative retinopathy - sees ophtho regularly, + microalbuminuria  macrovascular complications: CAD  ASA/GILDA/statin:  ASA 325mg daily,  atorvastatin 40mg    - previous myalgias on higher dose of statin   - angioedema development on lisinopril and irbesartan    - avoidance of classes for now     Patient has met criteria for CGM coverage;   - patient has type 2 diabetes mellitus; and  - patient has been using a blood glucose monitor and performing four or more blood glucose test per day; and  - patient is insulin treated with multiple daily injections of three or more times per day; and  - within six months prior to ordering the CGM, the patient has had an in-person visit with the practitioner; and determined that criteria (1-4) above are met; and   - every six months following the initial prescription of the CGM, the treating practitioner has an in-person visit with the patient to assess adherence to their CGM regimen and diabetes treatment plan

## 2023-08-29 ENCOUNTER — TELEPHONE (OUTPATIENT)
Dept: FAMILY MEDICINE | Facility: CLINIC | Age: 78
End: 2023-08-29
Payer: MEDICARE

## 2023-08-29 NOTE — TELEPHONE ENCOUNTER
Called and offered pt an appt for Wed 8/30.  Pt asked if there was an UC in [place]; after being informed of UC in [place] w/in [facility] OR [facility].  Pt states she will just go to the ED.

## 2023-08-29 NOTE — TELEPHONE ENCOUNTER
Reason for Call:  Appointment Request    Patient requesting this type of appt:  Office Visit: Sore Throat & Coughing    Requested provider: Chicho Luong or anyone    Reason patient unable to be scheduled: Not within requested timeframe    When does patient want to be seen/preferred time: Same day    Comments: Pt declined speaking with the nurse to assess symptoms, declined evisit: Pt wants to be seen in person today, OK with a telephone call but would much rather be seen in person.     Could we send this information to you in [facility] or would you prefer to receive a phone call?:   Patient would prefer a phone call   Okay to leave a detailed message?: No at Home number on file [phone] (home)  If no answer on the home phone call cell: [phone]    Call taken on 8/29/2023 at 9:36 AM by Donna Andrade

## 2023-09-01 ENCOUNTER — TELEPHONE (OUTPATIENT)
Dept: FAMILY MEDICINE | Facility: CLINIC | Age: 78
End: 2023-09-01
Payer: MEDICARE

## 2023-09-01 NOTE — TELEPHONE ENCOUNTER
FYI - Status Update    Who is Calling: patient    Update: pt was informed to call back to update PCP on discontinuing Atorvastatin.  Pt would like to report that she is feeling fine w/out taking Rx.    Does caller want a call/response back: No

## 2023-09-07 PROBLEM — J04.0 ACUTE LARYNGITIS: Status: ACTIVE | Noted: 2023-08-29

## 2023-09-07 PROBLEM — E87.1 HYPONATREMIA: Status: ACTIVE | Noted: 2023-08-29

## 2023-09-07 PROBLEM — E11.3299 NONPROLIFERATIVE DIABETIC RETINOPATHY (H): Status: ACTIVE | Noted: 2023-09-07

## 2023-09-07 PROBLEM — N17.9 ACUTE RENAL FAILURE SUPERIMPOSED ON STAGE 3 CHRONIC KIDNEY DISEASE (H): Status: ACTIVE | Noted: 2023-08-29

## 2023-09-07 PROBLEM — Z79.4 LONG TERM CURRENT USE OF INSULIN (H): Status: ACTIVE | Noted: 2023-09-07

## 2023-09-07 PROBLEM — N18.30 ACUTE RENAL FAILURE SUPERIMPOSED ON STAGE 3 CHRONIC KIDNEY DISEASE (H): Status: ACTIVE | Noted: 2023-08-29

## 2023-09-07 PROBLEM — U07.1 COVID-19: Status: ACTIVE | Noted: 2023-08-29

## 2023-10-25 DIAGNOSIS — K21.00 GASTROESOPHAGEAL REFLUX DISEASE WITH ESOPHAGITIS WITHOUT HEMORRHAGE: ICD-10-CM

## 2023-10-25 RX ORDER — PANTOPRAZOLE SODIUM 40 MG/1
40 TABLET, DELAYED RELEASE ORAL DAILY
Qty: 90 TABLET | Refills: 2 | Status: SHIPPED | OUTPATIENT
Start: 2023-10-25 | End: 2024-07-25

## 2023-11-21 ENCOUNTER — APPOINTMENT (OUTPATIENT)
Dept: AUDIOLOGY | Facility: CLINIC | Age: 78
End: 2023-11-21
Payer: MEDICARE

## 2023-12-22 LAB — INR (EXTERNAL): 1.2 (ref ?–1.3)

## 2023-12-23 LAB — INR (EXTERNAL): 1.5 (ref ?–1.3)

## 2023-12-24 LAB — INR (EXTERNAL): 3.4 (ref ?–1.3)

## 2023-12-25 LAB — INR (EXTERNAL): 2.6 (ref ?–1.3)

## 2023-12-26 LAB — INR (EXTERNAL): 2.3 (ref ?–1.3)

## 2023-12-29 ENCOUNTER — OFFICE VISIT (OUTPATIENT)
Dept: FAMILY MEDICINE | Facility: CLINIC | Age: 78
End: 2023-12-29
Payer: MEDICARE

## 2023-12-29 ENCOUNTER — TELEPHONE (OUTPATIENT)
Dept: ANTICOAGULATION | Facility: CLINIC | Age: 78
End: 2023-12-29

## 2023-12-29 VITALS
WEIGHT: 162 LBS | HEART RATE: 62 BPM | SYSTOLIC BLOOD PRESSURE: 120 MMHG | OXYGEN SATURATION: 95 % | DIASTOLIC BLOOD PRESSURE: 52 MMHG | HEIGHT: 61 IN | TEMPERATURE: 97 F | RESPIRATION RATE: 16 BRPM | BODY MASS INDEX: 30.58 KG/M2

## 2023-12-29 DIAGNOSIS — Z09 HOSPITAL DISCHARGE FOLLOW-UP: Primary | ICD-10-CM

## 2023-12-29 DIAGNOSIS — E11.3299 NONPROLIFERATIVE DIABETIC RETINOPATHY (H): ICD-10-CM

## 2023-12-29 DIAGNOSIS — I21.4 NSTEMI (NON-ST ELEVATED MYOCARDIAL INFARCTION) (H): ICD-10-CM

## 2023-12-29 DIAGNOSIS — I48.0 PAROXYSMAL ATRIAL FIBRILLATION (H): Primary | ICD-10-CM

## 2023-12-29 DIAGNOSIS — Z79.4 TYPE 2 DIABETES MELLITUS WITH BOTH EYES AFFECTED BY MILD NONPROLIFERATIVE RETINOPATHY WITHOUT MACULAR EDEMA, WITH LONG-TERM CURRENT USE OF INSULIN (H): ICD-10-CM

## 2023-12-29 DIAGNOSIS — I48.0 PAROXYSMAL ATRIAL FIBRILLATION (H): ICD-10-CM

## 2023-12-29 DIAGNOSIS — I25.10 ATHEROSCLEROSIS OF NATIVE CORONARY ARTERY OF NATIVE HEART WITHOUT ANGINA PECTORIS: ICD-10-CM

## 2023-12-29 DIAGNOSIS — E11.3293 TYPE 2 DIABETES MELLITUS WITH BOTH EYES AFFECTED BY MILD NONPROLIFERATIVE RETINOPATHY WITHOUT MACULAR EDEMA, WITH LONG-TERM CURRENT USE OF INSULIN (H): ICD-10-CM

## 2023-12-29 DIAGNOSIS — R73.9 HYPERGLYCEMIA: ICD-10-CM

## 2023-12-29 LAB
ANION GAP SERPL CALCULATED.3IONS-SCNC: 13 MMOL/L (ref 7–15)
BUN SERPL-MCNC: 55.7 MG/DL (ref 8–23)
CALCIUM SERPL-MCNC: 9.9 MG/DL (ref 8.8–10.2)
CHLORIDE SERPL-SCNC: 90 MMOL/L (ref 98–107)
CREAT SERPL-MCNC: 2.32 MG/DL (ref 0.51–0.95)
CREAT UR-MCNC: 121 MG/DL
DEPRECATED HCO3 PLAS-SCNC: 27 MMOL/L (ref 22–29)
EGFRCR SERPLBLD CKD-EPI 2021: 21 ML/MIN/1.73M2
FASTING STATUS PATIENT QL REPORTED: ABNORMAL
GLUCOSE SERPL-MCNC: 269 MG/DL (ref 70–99)
GLUCOSE SERPL-MCNC: 269 MG/DL (ref 70–99)
MICROALBUMIN UR-MCNC: 12.6 MG/L
MICROALBUMIN/CREAT UR: 10.41 MG/G CR (ref 0–25)
POTASSIUM SERPL-SCNC: 4.5 MMOL/L (ref 3.4–5.3)
SODIUM SERPL-SCNC: 130 MMOL/L (ref 135–145)

## 2023-12-29 PROCEDURE — 82570 ASSAY OF URINE CREATININE: CPT

## 2023-12-29 PROCEDURE — 99214 OFFICE O/P EST MOD 30 MIN: CPT | Performed by: NURSE PRACTITIONER

## 2023-12-29 PROCEDURE — 80048 BASIC METABOLIC PNL TOTAL CA: CPT | Performed by: NURSE PRACTITIONER

## 2023-12-29 PROCEDURE — 82043 UR ALBUMIN QUANTITATIVE: CPT

## 2023-12-29 PROCEDURE — 36415 COLL VENOUS BLD VENIPUNCTURE: CPT | Performed by: NURSE PRACTITIONER

## 2023-12-29 RX ORDER — AMOXICILLIN 250 MG
1-2 CAPSULE ORAL
COMMUNITY
Start: 2023-12-24 | End: 2024-04-09 | Stop reason: ALTCHOICE

## 2023-12-29 RX ORDER — CARVEDILOL 6.25 MG/1
6.25 TABLET ORAL
COMMUNITY
Start: 2023-12-24 | End: 2024-02-23

## 2023-12-29 RX ORDER — WARFARIN SODIUM 2 MG/1
2 TABLET ORAL DAILY
COMMUNITY
End: 2024-02-09

## 2023-12-29 RX ORDER — CLOPIDOGREL BISULFATE 75 MG/1
1 TABLET ORAL EVERY MORNING
COMMUNITY
Start: 2023-12-25 | End: 2024-02-09

## 2023-12-29 RX ORDER — RESPIRATORY SYNCYTIAL VIRUS VACCINE 120MCG/0.5
0.5 KIT INTRAMUSCULAR ONCE
Qty: 1 EACH | Refills: 0 | Status: CANCELLED | OUTPATIENT
Start: 2023-12-29 | End: 2023-12-29

## 2023-12-29 RX ORDER — NITROGLYCERIN 0.4 MG/1
TABLET SUBLINGUAL
COMMUNITY
Start: 2023-12-24

## 2023-12-29 RX ORDER — BUMETANIDE 2 MG/1
2 TABLET ORAL
COMMUNITY
Start: 2023-12-24 | End: 2024-02-23

## 2023-12-29 ASSESSMENT — ENCOUNTER SYMPTOMS
CARDIOVASCULAR NEGATIVE: 1
ABDOMINAL PAIN: 0
SPEECH DIFFICULTY: 0
RESPIRATORY NEGATIVE: 1
SORE THROAT: 1
LIGHT-HEADEDNESS: 0
APPETITE CHANGE: 1
VOMITING: 0
CHILLS: 0
FEVER: 0
CONSTIPATION: 0
FATIGUE: 1
ACTIVITY CHANGE: 1
NAUSEA: 0
DIZZINESS: 1
DIARRHEA: 0
TROUBLE SWALLOWING: 1
WEAKNESS: 1

## 2023-12-29 NOTE — PROGRESS NOTES
Assessment & Plan     Hospital discharge follow-up  Presents today for hospital follow-up from [facility] for NSTEMI with multivessel disease status post CABG x 4 and also emergency department follow-up for hyperglycemia.  The incision site appears to be healing appropriately without any signs of infection.  She had cardiology follow-up yesterday where hyperglycemia was noted on labs.  See below.    Hyperglycemia  Presented to [place] emergency department on 12/28/2023, yesterday.  No medication changes made.  She skipped long-acting insulin, Toujeo, the night before and her blood sugar was 576 a cardiology appointment.  Sugar reading on implanted glucose sensor was 67, reading falsely low.  Fingerstick readings shown in the 400s.  He was instructed to resume her insulin regimen of Toujeo and NovoLog.  Toujeo ordered as 36 units that she takes differently depending on her blood sugar.  She took only 23 units last night and her blood sugar still elevated in the 400s.  She took 11 units of NovoLog although ordered as 12 to 15 units.  She is worried about low blood sugar reading, especially due to poor appetite.  I reassured her at her high levels that her blood sugar will not go too low if she takes the full 15 units of NovoLog.  Recommend increasing Toujeo to 25 units tonight.  Continue to monitor and may increase by 1 to 2 units nightly if elevated  but not to exceed 36 units as prescribed.  Will check BMP today.  - Basic metabolic panel; Future    Atherosclerosis of native coronary artery of native heart without angina pectoris  Appears to be recovering as expected from CABG.  No chest pain or shortness of breath.  Did review her medications today.  She stopped atorvastatin 6 months ago due to myalgias and states she feels better off of it.  PCP recommend starting Crestor and I offered this today, but she declines at this time.  Plans to discuss with her cardiologist at the end of January.  Already ate  "this morning so we will defer FLP until follow-up.    NSTEMI (non-ST elevated myocardial infarction) (H)  As above.    Type 2 diabetes mellitus with both eyes affected by mild nonproliferative retinopathy without macular edema, with long-term current use of insulin (H)  Lab work shows blood sugars improving.  See hyperglycemia section above.    - Basic metabolic panel  - Glucose    Nonproliferative diabetic retinopathy (H)  Reports she sees her ophthalmologist, Dr. Brito, regularly.  Was supposed to have shots in December but deferred due to NSTEMI and CABG.    Paroxysmal atrial fibrillation (H)  Heart rate regular on exam today.  Had postop atrial fibrillation, treated with amiodarone but did not tolerate.  She was discharged on warfarin and Coreg.  She has follow-up with cardiology on 1/30/2024           MED REC REQUIRED  Post Medication Reconciliation Status: discharge medications reconciled, continue medications without change  BMI:   Estimated body mass index is 30.61 kg/m  as calculated from the following:    Height as of this encounter: 1.549 m (5' 1\").    Weight as of this encounter: 73.5 kg (162 lb).           HENNA Monet CNP  [facility]    Renae Topete is a 78 year old, presenting for the following health issues:  Hospital F/U (Hospital stay from 12/17/2023, bypass surgery was 12/19/2023, pt was discharged on 12/25/2023, heart attack, pt. Had bypass surgery, pt.'s blood sugar is very unstable and high in the upper 400's)        12/29/2023    10:10 AM   Additional Questions   Roomed by RADHA Joel       Yoselyn is a pleasant 78-year-old female patient of Dr. Edgardo Luong who presents today for hospital follow-up and emergency department visit.  She presented to emergency department in [place] on 12/13/2023 for dizziness.  Stroke code called, CT angiogram showed partially occluded tiny left vertebral artery.  MRI completed and no acute abnormality found.  EKG abnormal " and troponin levels increased.  She was then transferred to [facility] in [place].  Cardiac angiogram showed multivessel disease.  She had CABG x 4 on 12/19/2023.  Also treated for aspiration pneumonia.  Developed postop A-fib and treated with amiodarone but did not tolerate.  Discharged on warfarin and Coreg.  Medication chest meds were made cardiology and these were updated in her chart.  States she doesn't feel too bad all things considered.  However, at her cardiology appointment, lab work showed elevated blood glucose at 572.  She was sent to the emergency department here in [place].  She does have implanted sensor which was reading low and incorrectly.  The fingerstick glucose was reading in the 500s.  She was discharged home instructions to resume routine insulin regimen and to use fingerstick glucometer checks.  She does have concerns for hypoglycemic events and therefore frequently adjust her insulin medication, taking lower doses.  Most recently when she was having lower readings with her implanted sensor, she skipped evening dose of Toujeo.  Looks like her blood sugar was well-controlled while she was at [facility].    Today, states her blood sugar was 421 fasting before breakfast.  She ate a piece of toast with jelly and took only 11 units of NovoLog instead of 15.  Checked her blood sugar using fingerstick method less than 2 hours later was 495.  She states she is worried about her blood sugar going low and therefore she only took 11 units even though her blood sugar was very prior elevated to eating.  States she took 23 units of Toujeo last night.  I records indicate Toujeo is prescribed to take 36 units daily.  Did address her concerns about hypoglycemic event.  She states she is worried about going low because she not have a big appetite.  He goes on to tell me she has been drinking apple juice.  We discussed importance of increasing protein in the diet not only stabilize blood sugars but also  "for wound healing.  She denies nausea, vomiting, headache or abdominal pain.  No lightheadedness or dizziness.  No chest pain or shortness of breath.  However, states she just does not feel right.    Next cardiology appointment is 1/30/2024.    Did review her medications today.  She stopped atorvastatin 6 months ago due to myalgias and states she feels better off of it.  PCP recommend starting Crestor and I offered this today, but she declines at this time.  Plans to discuss with her cardiologist at the end of January.  Already ate this morning so we will defer FLP until follow-up.    Plans to take novolog as normal (15 units).  Will check blood sugar tonight and will take Toujeo but does not plan to take full dose of 36 units, sometimse goes low-53.  She feels 36 units is too much.  She thinks she was normally taking 23 units nightly.  I recommend increasing to 25 units nightly.                          Review of Systems   Constitutional:  Positive for activity change, appetite change and fatigue. Negative for chills and fever.   HENT:  Positive for sore throat and trouble swallowing.         Secondary to intubation   Respiratory: Negative.     Cardiovascular: Negative.  Negative for chest pain and peripheral edema.   Gastrointestinal:  Negative for abdominal pain, constipation, diarrhea, nausea and vomiting.   Genitourinary: Negative.    Neurological:  Positive for dizziness and weakness. Negative for speech difficulty and light-headedness.            Objective    /52 (BP Location: Right arm, Patient Position: Sitting, Cuff Size: Adult Regular)   Pulse 62   Temp 97  F (36.1  C) (Tympanic)   Resp 16   Ht 1.549 m (5' 1\")   Wt 73.5 kg (162 lb)   LMP  (LMP Unknown)   SpO2 95%   BMI 30.61 kg/m    Body mass index is 30.61 kg/m .  Physical Exam  Constitutional:       Appearance: She is not ill-appearing or toxic-appearing.      Comments: Tired appearing, frail   Cardiovascular:      Rate and Rhythm: Normal " rate and regular rhythm.      Heart sounds: Normal heart sounds.   Pulmonary:      Effort: Pulmonary effort is normal.      Breath sounds: Normal breath sounds. No wheezing, rhonchi or rales.   Skin:     General: Skin is warm and dry.      Coloration: Skin is not jaundiced.   Neurological:      Mental Status: She is alert and oriented to person, place, and time.      Cranial Nerves: No cranial nerve deficit.   Psychiatric:         Mood and Affect: Mood normal.         Behavior: Behavior normal.      Comments: Concurrent situation            Results for orders placed or performed in visit on 12/29/23   Basic metabolic panel     Status: Abnormal   Result Value Ref Range    Sodium 130 (L) 135 - 145 mmol/L    Potassium 4.5 3.4 - 5.3 mmol/L    Chloride 90 (L) 98 - 107 mmol/L    Carbon Dioxide (CO2) 27 22 - 29 mmol/L    Anion Gap 13 7 - 15 mmol/L    Urea Nitrogen 55.7 (H) 8.0 - 23.0 mg/dL    Creatinine 2.32 (H) 0.51 - 0.95 mg/dL    GFR Estimate 21 (L) >60 mL/min/1.73m2    Calcium 9.9 8.8 - 10.2 mg/dL    Glucose 269 (H) 70 - 99 mg/dL   Glucose     Status: Abnormal   Result Value Ref Range    Glucose 269 (H) 70 - 99 mg/dL    Patient Fasting > 8hrs? Unknown

## 2023-12-29 NOTE — TELEPHONE ENCOUNTER
Patient seen by Shari Kent NP today at [facility].  Plan was to check INR today and switch INR management to [facility] but orders have not been placed yet.  Message has been sent to provider.  Norris SANCHEZ          Outpatient Warfarin Management: new start - TBD   Prior to admission home dose: new start - TBD  Target INR: 2-3      Warfarin Doses During Hospital Admission:  Date       INR       Dose  12/22      1.2        5 mg  12/23      1.5        3 mg  12/24      3.4        Hold  12/25      2.6   12/26 INR 2.3 (managed by Ajit)  Dosing recommendation: Warfarin 2 mg on Monday 12/25. Recheck INR on Tuesday 12/26. If unable to check INR on 12/26, take warfarin 2 mg daily and check INR as soon as possible.   Recheck INR: Tuesday 12/26   Special considerations (for example; New DDI, VitK/FFP admin/dates, etc.):  - CABG x 4 on 12/19  - Amiodarone 12/20-12/23 inpatient    Writer reached out to patient. Patient states she has a PCP appt on Friday (12/29/23), and which she would like anticoagulation management to be provided by PCP.      Patient reports she took Warfarin 2mg yesterday (12/28/23). Patient advised to take Warfarin 2mg today (12/27/23) and Thursday (12/28/23). Patient advised to notify PCP of INR care management. Patient understands.     Tressa Pope RN BSN................... 12/27/2023 9:37 AM

## 2023-12-30 DIAGNOSIS — N18.30 CHRONIC KIDNEY DISEASE, STAGE III (MODERATE) (H): Primary | ICD-10-CM

## 2023-12-31 ENCOUNTER — HEALTH MAINTENANCE LETTER (OUTPATIENT)
Age: 78
End: 2023-12-31

## 2024-01-03 ENCOUNTER — DOCUMENTATION ONLY (OUTPATIENT)
Dept: ANTICOAGULATION | Facility: CLINIC | Age: 79
End: 2024-01-03
Payer: MEDICARE

## 2024-01-03 DIAGNOSIS — I48.0 PAROXYSMAL ATRIAL FIBRILLATION (H): Primary | ICD-10-CM

## 2024-01-03 NOTE — PROGRESS NOTES
"ANTICOAGULATION  MANAGEMENT: NEW REFERRAL      SUBJECTIVE/OBJECTIVE     Yoselyn Doyle, a 78 year old female  is newly referred to [facility].    Anticoagulation:    Previously on warfarin: No, new to anticoagulation  Warfarin initiation date (approximate): 12/22/23   Indication(s): Atrial Fibrillation   Goal Range: 2.0-3.0   Anticoagulation Bridge/Overlap: No   Referring provider: from PCP    General Dietary/Social Hx:    Typical vitamin K intake: low; consistent     Other dietary considerations: None     Social History:   Social History     Tobacco Use    Smoking status: Former    Smokeless tobacco: Never    Tobacco comments:     12/00 aprox 30 pack years   Vaping Use    Vaping Use: Never used   Substance Use Topics    Alcohol use: No    Drug use: No       In the past 2 weeks, patient estimates taking medications as instructed % of time: 100%    Results:        No results for input(s): \"INR\", \"[id number]\", \"F2\", \"ALMWH\" in the last 168 hours.    Wt Readings from Last 2 Encounters:   12/29/23 73.5 kg (162 lb)   06/23/23 73.5 kg (162 lb)      Estimated body mass index is 30.61 kg/m  as calculated from the following:    Height as of 12/29/23: 1.549 m (5' 1\").    Weight as of 12/29/23: 73.5 kg (162 lb).  Lab Results   Component Value Date    AST 18 10/22/2001    ALT 44 10/22/2001     Lab Results   Component Value Date    CR 2.32 (H) 12/29/2023     Estimated Creatinine Clearance: 18 mL/min (A) (based on SCr of 2.32 mg/dL (H)).    ASSESSMENT     Goal INR 2-3, standard for indication(s) above  Establishing initial warfarin maintenance dose (on warfarin < 30 days)   Factors that may increase sensitivity to warfarin: Age > 75, Female gender, Kidney Disease, and Low greens/vitamin K intake  Factors that may reduce sensitivity to warfarin: Weight > 90 kg  Potential significant drug interactions with home medications: None noted  Starting warfarin dose is appropriate for patient's anticipated " sensitivity to warfarin    PLAN     Dosing Instructions: Continue your current warfarin dose with INR in 1 day       Summary  As of 1/3/2024      Full warfarin instructions:  2 mg every day   Next INR check:  1/4/2024               Education provided:   Taking warfarin: take warfarin at same time each day; preferably in the evening and prescribed tablet strength and color  Goal range and lab monitoring: Importance of following up at instructed interval and frequency of lab work when starting warfarin and importance of following up when instructed (extends after stability established)  Dietary considerations: importance of consistent vitamin K intake and impact of vitamin K foods on INR  Symptom monitoring: monitoring for bleeding signs and symptoms  Importance of notifying anticoagulation clinic for: changes in medications; a sooner lab recheck maybe needed  Contact [phone] with any changes, questions or concerns.     Education still needed:   Dietary considerations: Impact of protein intake on INR   Healthy lifestyle considerations: potential interaction between warfarin and alcohol, impact of smoking or tobacco on INR, impact of exercise/activity level on INR, and impact of CBD, marijuana, or medical marijuana on INR  Symptom monitoring: monitoring for clotting signs and symptoms, monitoring for stroke signs and symptoms, when to seek medical attention/emergency care, and if you hit your head or have a bad fall seek emergency care  Importance of notifying anticoagulation clinic for: diarrhea, nausea/vomiting, reduced intake, cold/flu, and/or infections; a sooner lab recheck maybe needed, upcoming surgeries and procedures 2 weeks in advance, and if you did not receive dosing instructions on the same day as your labs were checked      Telephone call with Yoselyn who verbalizes understanding and agrees to plan    Lab visit scheduled    Standing orders placed in Epic: Point of Care INR (Lab 5000)    Plan made per  [facility] anticoagulation protocol    Magaly Gillespie RN  Anticoagulation Clinic  1/3/2024

## 2024-01-03 NOTE — TELEPHONE ENCOUNTER
Please review and sign anticoagulation referral and INR order as soon as possible. Thank you,  Magaly Gillespie RN

## 2024-01-04 ENCOUNTER — ANTICOAGULATION THERAPY VISIT (OUTPATIENT)
Dept: ANTICOAGULATION | Facility: CLINIC | Age: 79
End: 2024-01-04

## 2024-01-04 ENCOUNTER — OFFICE VISIT (OUTPATIENT)
Dept: FAMILY MEDICINE | Facility: CLINIC | Age: 79
End: 2024-01-04
Payer: MEDICARE

## 2024-01-04 ENCOUNTER — ALLIED HEALTH/NURSE VISIT (OUTPATIENT)
Dept: FAMILY MEDICINE | Facility: CLINIC | Age: 79
End: 2024-01-04
Payer: MEDICARE

## 2024-01-04 ENCOUNTER — NURSE TRIAGE (OUTPATIENT)
Dept: FAMILY MEDICINE | Facility: CLINIC | Age: 79
End: 2024-01-04

## 2024-01-04 ENCOUNTER — LAB (OUTPATIENT)
Dept: LAB | Facility: CLINIC | Age: 79
End: 2024-01-04
Payer: MEDICARE

## 2024-01-04 VITALS — HEART RATE: 66 BPM | SYSTOLIC BLOOD PRESSURE: 116 MMHG | DIASTOLIC BLOOD PRESSURE: 66 MMHG | OXYGEN SATURATION: 100 %

## 2024-01-04 DIAGNOSIS — I48.0 PAROXYSMAL ATRIAL FIBRILLATION (H): Primary | ICD-10-CM

## 2024-01-04 DIAGNOSIS — I25.10 ATHEROSCLEROSIS OF NATIVE CORONARY ARTERY OF NATIVE HEART WITHOUT ANGINA PECTORIS: ICD-10-CM

## 2024-01-04 DIAGNOSIS — T81.49XA POSTOPERATIVE INFECTION OF WOUND OF STERNUM: Primary | ICD-10-CM

## 2024-01-04 DIAGNOSIS — L08.9 WOUND INFECTION: Primary | ICD-10-CM

## 2024-01-04 DIAGNOSIS — I48.0 PAROXYSMAL ATRIAL FIBRILLATION (H): ICD-10-CM

## 2024-01-04 DIAGNOSIS — T14.8XXA WOUND INFECTION: Primary | ICD-10-CM

## 2024-01-04 DIAGNOSIS — E11.3299 NONPROLIFERATIVE DIABETIC RETINOPATHY (H): ICD-10-CM

## 2024-01-04 DIAGNOSIS — N18.32 STAGE 3B CHRONIC KIDNEY DISEASE (H): ICD-10-CM

## 2024-01-04 PROBLEM — J04.0 ACUTE LARYNGITIS: Status: RESOLVED | Noted: 2023-08-29 | Resolved: 2024-01-04

## 2024-01-04 LAB — INR BLD: 1.8 (ref 0.9–1.1)

## 2024-01-04 PROCEDURE — 87186 SC STD MICRODIL/AGAR DIL: CPT | Performed by: INTERNAL MEDICINE

## 2024-01-04 PROCEDURE — 87077 CULTURE AEROBIC IDENTIFY: CPT | Performed by: INTERNAL MEDICINE

## 2024-01-04 PROCEDURE — 87070 CULTURE OTHR SPECIMN AEROBIC: CPT | Performed by: INTERNAL MEDICINE

## 2024-01-04 PROCEDURE — 87205 SMEAR GRAM STAIN: CPT | Performed by: INTERNAL MEDICINE

## 2024-01-04 PROCEDURE — 99213 OFFICE O/P EST LOW 20 MIN: CPT | Performed by: INTERNAL MEDICINE

## 2024-01-04 PROCEDURE — 36416 COLLJ CAPILLARY BLOOD SPEC: CPT

## 2024-01-04 PROCEDURE — 85610 PROTHROMBIN TIME: CPT | Mod: QW

## 2024-01-04 RX ORDER — DOXYCYCLINE HYCLATE 100 MG
100 TABLET ORAL 2 TIMES DAILY
Qty: 14 TABLET | Refills: 0 | Status: SHIPPED | OUTPATIENT
Start: 2024-01-04 | End: 2024-01-11

## 2024-01-04 RX ORDER — LEVOFLOXACIN 500 MG/1
500 TABLET, FILM COATED ORAL EVERY OTHER DAY
Qty: 4 TABLET | Refills: 0 | Status: SHIPPED | OUTPATIENT
Start: 2024-01-04 | End: 2024-01-11

## 2024-01-04 NOTE — TELEPHONE ENCOUNTER
"S-(situation): Patient presents to clinic with concerns for infected surgical wound.     B-(background): Patient has CABG x4 on 12/19/23. Reports incision was healing nicely with no signs/symptoms of infection up until 2 days ago. Starting 2 days ago, patient reports redness below sternum and increased white drainage.     A-(assessment): Denies fever, pain, chills, SOB.     R-(recommendations): Discussed with Dr. Luong who agrees to see patient for visit in clinic.     Routing triage to [facility] team per Dr. Luong's request as he will be starting patient on antibiotics today.       Reason for Disposition   Surgical wound infection suspected (post-op)    Answer Assessment - Initial Assessment Questions  1. LOCATION: \"Where is the wound located?\"       Sternum  2. WOUND APPEARANCE: \"What does the wound look like?\"       Top portion of wound is scabbed over, mid to lower part of incision is white in color with clear drainage noted at edges.   3. SIZE: If redness is present, ask: \"What is the size of the red area?\" (Inches, centimeters, or compare to size of a coin)       Yes redness noted around outer edges of lower incision  4. SPREAD: \"What's changed in the last day?\"  \"Do you see any red streaks coming from the wound?\"      Redness and drainage  5. ONSET: \"When did it start to look infected?\"       yesterday  6. MECHANISM: \"How did the wound start, what was the cause?\"      CABG x4 on 12/19  7. PAIN: Do you have any pain?\"  If Yes, ask: \"How bad is the pain?\"  (e.g., Scale 1-10; mild, moderate, or severe)     - MILD (1-3): Doesn't interfere with normal activities.      - MODERATE (4-7): Interferes with normal activities or awakens from sleep.     - SEVERE (8-10): Excruciating pain, unable to do any normal activities.        none  8. FEVER: \"Do you have a fever?\" If Yes, ask: \"What is your temperature, how was it measured, and when did it start?\"      None noted  9. OTHER SYMPTOMS: \"Do you have any other symptoms?\" " "(e.g., shaking chills, weakness, rash elsewhere on body)      none  10. PREGNANCY: \"Is there any chance you are pregnant?\" \"When was your last menstrual period?\"        N/a    Protocols used: Wound Infection [id number]    "

## 2024-01-04 NOTE — PROGRESS NOTES
"  Assessment & Plan   Problem List Items Addressed This Visit          Endocrine    Nonproliferative diabetic retinopathy (H)       Circulatory    Atherosclerosis of native coronary artery of native heart without angina pectoris     - Lexiscan (12/2016) - no reversible defects   - previously intolerant to higher dose ISMN, she does have known atherostenotic disease involving LCx   - ASA, atenolol  daily, atorvastatin 40mg QHS,   12/2023: NSTEMI presentation, admitted to [facility] -undergoing subsequent CABG           Paroxysmal atrial fibrillation (H)     Perioperative atrial fibrillation after bypass  On warfarin            Musculoskeletal and Integumentary    Postoperative infection of wound of sternum - Primary     1/4/2024: Brought to my attention by INR nursing staff  -Localized erythema with some expressed purulent granulation tissue at inferior margin  -Wound culture obtained  -Starting empirically on levofloxacin 500 mg every other day and doxycycline 100 mg twice daily for intended 1 week  -Instructing patient to contact thoracic surgery office related to developments and encouraged her to be seen in person by surgical staff         Relevant Medications    doxycycline hyclate (VIBRA-TABS) 100 MG tablet    levofloxacin (LEVAQUIN) 500 MG tablet       Urinary    Stage 3b chronic kidney disease (H)             BMI:   Estimated body mass index is 30.61 kg/m  as calculated from the following:    Height as of 12/29/23: 1.549 m (5' 1\").    Weight as of 12/29/23: 73.5 kg (162 lb).           Chicho Luong MD  [facility]    Renae Topete is a 78 year old, presenting for the following health issues: I was asked see the patient by our RN our nurse due to concerns about redness and some drainage noted from the inferior aspect of her sternotomy incision.  Started on warfarin for perioperative atrial fibrillation.  Surgery complicated by acute kidney injury; last creatinine 2.3 " which is above her baseline.  She has noticed some redness and irritation at the bottom of her sternal incision.  No fevers or chills.  No localized pain.  Infection        12/29/2023    10:10 AM   Additional Questions   Roomed by RADHA Joel       HPI           Review of Systems   Constitutional, HEENT, cardiovascular, pulmonary, gi and gu systems are negative, except as otherwise noted.      Objective    LMP  (LMP Unknown)   There is no height or weight on file to calculate BMI.  Physical Exam   Sternotomy incision well-approximated with expected healing at the more superior and median aspect of the wound.  The inferior margin has some small degree of separation with whitish granulation tissue expressed from the most inferior margin.  Localized regional erythema also seen in same area.    Lab on 01/04/2024   Component Date Value Ref Range Status    INR 01/04/2024 1.8 (H)  0.9 - 1.1 Final

## 2024-01-04 NOTE — ASSESSMENT & PLAN NOTE
- Lexiscan (12/2016) - no reversible defects   - previously intolerant to higher dose ISMN, she does have known atherostenotic disease involving LCx   - ASA, atenolol  daily, atorvastatin 40mg QHS,   12/2023: NSTEMI presentation, admitted to [facility] -undergoing subsequent CABG

## 2024-01-04 NOTE — PROGRESS NOTES
ANTICOAGULATION MANAGEMENT     Yoselyn Doyle 78 year old female is on warfarin with subtherapeutic INR result. (Goal INR 2.0-3.0)    Recent labs: (last 7 days)     01/04/24  1318   INR 1.8*       ASSESSMENT     Source(s): Chart Review and Patient/Caregiver Call     Warfarin doses taken: Warfarin taken as instructed  Diet: No new diet changes identified  Medication/supplement changes: Doxycycline 7 day course (dates: 1/4-1/10) not expected to affect INR, but may increase risk of bleeding  Levofloxacin started on 1/4/24 every other day x7 days, not expected to affect INR, but may increase risk of bleeding  New illness, injury, or hospitalization: Yes: Infection of surgical site  Signs or symptoms of bleeding or clotting: No  Previous result: Therapeutic last 2(+) visits in hospital  Additional findings: New start on day 14 of warfarin       PLAN     Recommended plan for temporary change(s) and ongoing change(s) affecting INR     Dosing Instructions: Increase your warfarin dose (7.1% change) with next INR in 4-5 days       Summary  As of 1/4/2024      Full warfarin instructions:  3 mg every Thu; 2 mg all other days   Next INR check:  1/9/2024               Telephone call with Yoselyn who verbalizes understanding and agrees to plan and who agrees to plan and repeated back plan correctly    Lab visit scheduled    Education provided:   Goal range and lab monitoring: goal range and significance of current result and Importance of following up at instructed interval  Contact [phone] with any changes, questions or concerns.     Plan made with [facility] Pharmacist Charissa Gillespie, RN  Anticoagulation Clinic  1/4/2024    _______________________________________________________________________     Anticoagulation Episode Summary       Current INR goal:  2.0-3.0   TTR:  --   Target end date:  Indefinite   Send INR reminders to:  [facility] [place]    Indications    Paroxysmal atrial fibrillation (H) [I48.0]              Comments:               Anticoagulation Care Providers       Provider Role Specialty Phone number    Chicho Luong MD Referring Internal Medicine [phone]

## 2024-01-04 NOTE — ASSESSMENT & PLAN NOTE
1/4/2024: Brought to my attention by INR nursing staff  -Localized erythema with some expressed purulent granulation tissue at inferior margin  -Wound culture obtained  -Starting empirically on levofloxacin 500 mg every other day and doxycycline 100 mg twice daily for intended 1 week  -Instructing patient to contact thoracic surgery office related to developments and encouraged her to be seen in person by surgical staff

## 2024-01-07 LAB
BACTERIA WND CULT: ABNORMAL
GRAM STAIN RESULT: ABNORMAL
GRAM STAIN RESULT: ABNORMAL

## 2024-01-16 LAB — INR (EXTERNAL): 1.5 (ref ?–1.1)

## 2024-01-18 ENCOUNTER — TELEPHONE (OUTPATIENT)
Dept: FAMILY MEDICINE | Facility: CLINIC | Age: 79
End: 2024-01-18
Payer: MEDICARE

## 2024-01-18 DIAGNOSIS — Z79.4 TYPE 2 DIABETES MELLITUS WITH BOTH EYES AFFECTED BY MILD NONPROLIFERATIVE RETINOPATHY WITHOUT MACULAR EDEMA, WITH LONG-TERM CURRENT USE OF INSULIN (H): Primary | ICD-10-CM

## 2024-01-18 DIAGNOSIS — E11.3293 TYPE 2 DIABETES MELLITUS WITH BOTH EYES AFFECTED BY MILD NONPROLIFERATIVE RETINOPATHY WITHOUT MACULAR EDEMA, WITH LONG-TERM CURRENT USE OF INSULIN (H): Primary | ICD-10-CM

## 2024-01-23 ENCOUNTER — TELEPHONE (OUTPATIENT)
Dept: ANTICOAGULATION | Facility: CLINIC | Age: 79
End: 2024-01-23
Payer: MEDICARE

## 2024-01-23 NOTE — TELEPHONE ENCOUNTER
Reviewing Yoselyn Rutledge's chart shows Warfarin was discontinued on 1/15/24 and no INR has been drawn since 1/16/24.    Spoke with Gina, Warfarin Clinic nurse. She is going to send a message to the provider in charge at the TCU to verify this was not an error.    Visits with providers today do not have Warfarin on active medication list.    Gina will call back as soon as she gets confirmation, but she said it may be a few days.    I will keep note in [facility] Phone Call pool and watch for response.

## 2024-01-23 NOTE — TELEPHONE ENCOUNTER
Gina calling from [facility] in [place] to advise they have been managing the patient's warfarin while in the TCU but has since discharged.  Gina wanted to notify the patient's ACC so follow up can be made with the patient.    Routing to p anticoag [place] for follow up.    PRINCESS OlveraN, RN  Anticoagulation Clinic

## 2024-01-24 ENCOUNTER — LAB (OUTPATIENT)
Dept: LAB | Facility: CLINIC | Age: 79
End: 2024-01-24
Payer: MEDICARE

## 2024-01-24 ENCOUNTER — ANTICOAGULATION THERAPY VISIT (OUTPATIENT)
Dept: ANTICOAGULATION | Facility: CLINIC | Age: 79
End: 2024-01-24

## 2024-01-24 DIAGNOSIS — N18.30 CHRONIC KIDNEY DISEASE, STAGE III (MODERATE) (H): Primary | ICD-10-CM

## 2024-01-24 DIAGNOSIS — I48.0 PAROXYSMAL ATRIAL FIBRILLATION (H): ICD-10-CM

## 2024-01-24 DIAGNOSIS — I48.0 PAROXYSMAL ATRIAL FIBRILLATION (H): Primary | ICD-10-CM

## 2024-01-24 LAB
ANION GAP SERPL CALCULATED.3IONS-SCNC: 15 MMOL/L (ref 7–15)
BUN SERPL-MCNC: 37.3 MG/DL (ref 8–23)
CALCIUM SERPL-MCNC: 9.6 MG/DL (ref 8.8–10.2)
CHLORIDE SERPL-SCNC: 97 MMOL/L (ref 98–107)
CREAT SERPL-MCNC: 2.26 MG/DL (ref 0.51–0.95)
DEPRECATED HCO3 PLAS-SCNC: 26 MMOL/L (ref 22–29)
EGFRCR SERPLBLD CKD-EPI 2021: 21 ML/MIN/1.73M2
ERYTHROCYTE [DISTWIDTH] IN BLOOD BY AUTOMATED COUNT: 13.5 % (ref 10–15)
GLUCOSE SERPL-MCNC: 284 MG/DL (ref 70–99)
HCT VFR BLD AUTO: 33.7 % (ref 35–47)
HGB BLD-MCNC: 10.5 G/DL (ref 11.7–15.7)
INR BLD: 1.3 (ref 0.9–1.1)
MCH RBC QN AUTO: 28.1 PG (ref 26.5–33)
MCHC RBC AUTO-ENTMCNC: 31.2 G/DL (ref 31.5–36.5)
MCV RBC AUTO: 90 FL (ref 78–100)
PLATELET # BLD AUTO: 283 10E3/UL (ref 150–450)
POTASSIUM SERPL-SCNC: 4.1 MMOL/L (ref 3.4–5.3)
RBC # BLD AUTO: 3.74 10E6/UL (ref 3.8–5.2)
SODIUM SERPL-SCNC: 138 MMOL/L (ref 135–145)
WBC # BLD AUTO: 10.9 10E3/UL (ref 4–11)

## 2024-01-24 PROCEDURE — 80048 BASIC METABOLIC PNL TOTAL CA: CPT | Performed by: INTERNAL MEDICINE

## 2024-01-24 PROCEDURE — 85027 COMPLETE CBC AUTOMATED: CPT | Performed by: INTERNAL MEDICINE

## 2024-01-24 PROCEDURE — 85610 PROTHROMBIN TIME: CPT | Mod: QW

## 2024-01-24 PROCEDURE — 36415 COLL VENOUS BLD VENIPUNCTURE: CPT | Performed by: INTERNAL MEDICINE

## 2024-01-24 NOTE — PROGRESS NOTES
ANTICOAGULATION MANAGEMENT     Yoselyn Doyle 78 year old female is on warfarin with subtherapeutic INR result. (Goal INR 2.0-3.0)    Recent labs: (last 7 days)     01/24/24  1333   INR 1.3*       ASSESSMENT     Source(s): Chart Review, Patient/Caregiver Call, and Home Care/Facility Nurse     Warfarin doses taken:  Dosing while in [facility] for the last week (since 1/16/24) has been 3 mg every Tues/Fri and 2 mg all other days. Unknown dosing from before that time.  Discharge instructions from hospital stay on 1/6-1/10 stated 2 mg daily.  Diet:  Back home now so likely to be different than what it was in hospital and TCU.  Medication/supplement changes: None noted for home meds  New illness, injury, or hospitalization: Yes: Discharged from TCU yesterday. Was there after hospital admission for sternal wound infection.  Signs or symptoms of bleeding or clotting: No  Previous result: Subtherapeutic  Additional findings: Post op with [facility] yesterday does not have Warfarin as active medication but Yoselyn said they told her to take 3 mg yesterday but she only took 2mg. Did find note from [facility] anticoagulation showing no INR result from yesterday but advising to take 3 mg on 1/23/24 and check INR on 1/24/24.       PLAN     Recommended plan for temporary change(s) and ongoing change(s) affecting INR     Dosing Instructions: booster dose then Increase your warfarin dose (12.5% change) with next INR in 5-7 days       Summary  As of 1/24/2024      Full warfarin instructions:  1/24: 6 mg; Otherwise 2 mg every Mon, Wed, Fri; 3 mg all other days   Next INR check:  1/30/2024               Telephone call with Yoselyn who verbalizes understanding and agrees to plan and who agrees to plan and repeated back plan correctly    Lab visit scheduled    Education provided:   Taking warfarin: prescribed tablet strength and color, Importance of taking warfarin as instructed, and don't take Warfarin the morning of  INR's  Goal range and lab monitoring: goal range and significance of current result, Importance of therapeutic range, Importance of following up at instructed interval, and frequency of lab work when starting warfarin and importance of following up when instructed (extends after stability established)  Dietary considerations: impact of vitamin K foods on INR and Impact of protein intake on INR   Contact [phone] with any changes, questions or concerns.     Plan made per [facility] anticoagulation protocol    Magaly Gillespie RN  Anticoagulation Clinic  1/24/2024    _______________________________________________________________________     Anticoagulation Episode Summary       Current INR goal:  2.0-3.0   TTR:  0.0% (2.3 wk)   Target end date:  Indefinite   Send INR reminders to:  [facility] [place]    Indications    Paroxysmal atrial fibrillation (H) [I48.0]             Comments:               Anticoagulation Care Providers       Provider Role Specialty Phone number    Chicho Luong MD Referring Internal Medicine [phone]

## 2024-01-24 NOTE — TELEPHONE ENCOUNTER
I spoke to [facility] DON and confirmed warfarin dosing for the last week. See [facility] calendar for updated dosing after INR was done at  clinic 1/24/24.  Magaly Gillespie RN

## 2024-01-31 ENCOUNTER — LAB (OUTPATIENT)
Dept: LAB | Facility: CLINIC | Age: 79
End: 2024-01-31
Payer: MEDICARE

## 2024-01-31 ENCOUNTER — ANTICOAGULATION THERAPY VISIT (OUTPATIENT)
Dept: ANTICOAGULATION | Facility: CLINIC | Age: 79
End: 2024-01-31

## 2024-01-31 DIAGNOSIS — I48.0 PAROXYSMAL ATRIAL FIBRILLATION (H): Primary | ICD-10-CM

## 2024-01-31 DIAGNOSIS — I48.0 PAROXYSMAL ATRIAL FIBRILLATION (H): ICD-10-CM

## 2024-01-31 LAB — INR BLD: 2.2 (ref 0.9–1.1)

## 2024-01-31 PROCEDURE — 36416 COLLJ CAPILLARY BLOOD SPEC: CPT

## 2024-01-31 PROCEDURE — 85610 PROTHROMBIN TIME: CPT | Mod: QW

## 2024-01-31 NOTE — PROGRESS NOTES
ANTICOAGULATION MANAGEMENT     Yoselyn Doyle 78 year old female is on warfarin with therapeutic INR result. (Goal INR 2.0-3.0)    Recent labs: (last 7 days)     01/31/24  1325   INR 2.2*       ASSESSMENT     Source(s): Chart Review and Patient/Caregiver Call     Warfarin doses taken: Warfarin taken as instructed  Diet: No new diet changes identified  Medication/supplement changes: None noted  New illness, injury, or hospitalization: No  Signs or symptoms of bleeding or clotting: No  Previous result: Subtherapeutic  Additional findings: None       PLAN     Recommended plan for no diet, medication or health factor changes affecting INR     Dosing Instructions: Continue your current warfarin dose with next INR in 1 week       Summary  As of 1/31/2024      Full warfarin instructions:  2 mg every Mon, Wed, Fri; 3 mg all other days   Next INR check:  2/7/2024               Telephone call with Yoselyn who agrees to plan and repeated back plan correctly    Lab visit scheduled    Education provided:   Contact [phone] with any changes, questions or concerns.     Plan made per [facility] anticoagulation protocol    Alka Gong RN  Anticoagulation Clinic  1/31/2024    _______________________________________________________________________     Anticoagulation Episode Summary       Current INR goal:  2.0-3.0   TTR:  6.6% (3.3 wk)   Target end date:  Indefinite   Send INR reminders to:  [facility] [place]    Indications    Paroxysmal atrial fibrillation (H) [I48.0]             Comments:               Anticoagulation Care Providers       Provider Role Specialty Phone number    Chicho Luong MD Referring Internal Medicine [phone]

## 2024-02-07 ENCOUNTER — LAB (OUTPATIENT)
Dept: LAB | Facility: CLINIC | Age: 79
End: 2024-02-07
Payer: MEDICARE

## 2024-02-07 ENCOUNTER — ANTICOAGULATION THERAPY VISIT (OUTPATIENT)
Dept: ANTICOAGULATION | Facility: CLINIC | Age: 79
End: 2024-02-07

## 2024-02-07 DIAGNOSIS — I48.0 PAROXYSMAL ATRIAL FIBRILLATION (H): Primary | ICD-10-CM

## 2024-02-07 DIAGNOSIS — I48.0 PAROXYSMAL ATRIAL FIBRILLATION (H): ICD-10-CM

## 2024-02-07 LAB — INR BLD: 2.5 (ref 0.9–1.1)

## 2024-02-07 PROCEDURE — 36416 COLLJ CAPILLARY BLOOD SPEC: CPT

## 2024-02-07 PROCEDURE — 85610 PROTHROMBIN TIME: CPT | Mod: QW

## 2024-02-07 NOTE — PROGRESS NOTES
ANTICOAGULATION MANAGEMENT     Yoselyn Doyle 78 year old female is on warfarin with therapeutic INR result. (Goal INR 2.0-3.0)    Recent labs: (last 7 days)     02/07/24  1317   INR 2.5*       ASSESSMENT     Source(s): Chart Review  Previous INR was Therapeutic last visit; previously outside of goal range  Medication, diet, health changes since last INR chart reviewed; none identified         PLAN     Recommended plan for no diet, medication or health factor changes affecting INR     Dosing Instructions: Continue your current warfarin dose with next INR in 2 weeks       Summary  As of 2/7/2024      Full warfarin instructions:  2 mg every Mon, Wed, Fri; 3 mg all other days   Next INR check:  2/21/2024               Detailed voice message left for Yoselyn with dosing instructions and follow up date.     Contact [phone] to schedule and with any changes, questions or concerns.     Education provided:   Please call back if any changes to your diet, medications or how you've been taking warfarin  Goal range and lab monitoring: goal range and significance of current result and Importance of following up at instructed interval  Contact [phone] with any changes, questions or concerns.     Plan made per [facility] anticoagulation protocol    Magaly Gillespie RN  Anticoagulation Clinic  2/7/2024    _______________________________________________________________________     Anticoagulation Episode Summary       Current INR goal:  2.0-3.0   TTR:  28.0% (1 mo)   Target end date:  Indefinite   Send INR reminders to:  [facility] [place]    Indications    Paroxysmal atrial fibrillation (H) [I48.0]             Comments:               Anticoagulation Care Providers       Provider Role Specialty Phone number    Chicho Luong MD Referring Internal Medicine [phone]

## 2024-02-09 DIAGNOSIS — I25.10 ATHEROSCLEROSIS OF NATIVE CORONARY ARTERY OF NATIVE HEART WITHOUT ANGINA PECTORIS: ICD-10-CM

## 2024-02-09 DIAGNOSIS — I48.0 PAROXYSMAL ATRIAL FIBRILLATION (H): Primary | ICD-10-CM

## 2024-02-09 RX ORDER — BUMETANIDE 2 MG/1
2 TABLET ORAL DAILY
Qty: 90 TABLET | Refills: 0 | OUTPATIENT
Start: 2024-02-09

## 2024-02-09 RX ORDER — WARFARIN SODIUM 2 MG/1
2-3 TABLET ORAL DAILY
Qty: 135 TABLET | Refills: 1 | Status: SHIPPED | OUTPATIENT
Start: 2024-02-09 | End: 2024-02-23

## 2024-02-09 RX ORDER — CLOPIDOGREL BISULFATE 75 MG/1
75 TABLET ORAL EVERY MORNING
Qty: 90 TABLET | Refills: 3 | Status: SHIPPED | OUTPATIENT
Start: 2024-02-09

## 2024-02-09 RX ORDER — BUMETANIDE 2 MG/1
2 TABLET ORAL DAILY
COMMUNITY
End: 2024-02-16

## 2024-02-09 NOTE — TELEPHONE ENCOUNTER
Patient has AWV scheduled for 02/23/2024; she is completely out of the Bumex and if nothing else, would greatly appreciate if that could be sent to the pharmacy [facility].  She's low on her other medications.    ---      Medication Question or Refill  What medication are you calling about (include dose and sig)?:       Preferred Pharmacy:  [facility] [address] [place] [id number]  Phone: [phone] Fax: [phone]      Controlled Substance Agreement on file:   CSA -- Patient Level:    CSA: None found at the patient level.       Who prescribed the medication?: [facility]    Do you need a refill? Yes    Patient offered an appointment? Yes: Patient has AWV scheduled for 02/23/2024    Do you have any questions or concerns?  No      Could we send this information to you in [facility] or would you prefer to receive a phone call?:     Patient would prefer a phone call     Okay to leave a detailed message?: Yes at Home number on file [phone] (home)

## 2024-02-09 NOTE — TELEPHONE ENCOUNTER
ANTICOAGULATION MANAGEMENT:  Medication Refill    Anticoagulation Summary  As of 2/7/2024      Warfarin maintenance plan:  2 mg (2 mg x 1) every Mon, Wed, Fri; 3 mg (2 mg x 1.5) all other days   Next INR check:  2/21/2024   Target end date:  Indefinite    Indications    Paroxysmal atrial fibrillation (H) [I48.0]                 Anticoagulation Care Providers       Provider Role Specialty Phone number    Chicho Luong MD Referring Internal Medicine [phone]            Refill Criteria    Visit with referring provider/group: Meets criteria: office visit within referring provider group in the last 1 year on 1/4/24    [facility] referral signed last signed: 01/03/2024; within last year: Yes    Lab monitoring not exceeding 2 weeks overdue: Yes    Yoselyn meets all criteria for refill. Rx instructions and quantity supplied updated to match patient's current dosing plan. Warfarin 90 day supply with 1 refill granted per [facility] protocol     Alka Gong RN  Anticoagulation Clinic

## 2024-02-16 ENCOUNTER — TELEPHONE (OUTPATIENT)
Dept: FAMILY MEDICINE | Facility: CLINIC | Age: 79
End: 2024-02-16
Payer: MEDICARE

## 2024-02-16 DIAGNOSIS — I21.4 NSTEMI (NON-ST ELEVATED MYOCARDIAL INFARCTION) (H): Primary | ICD-10-CM

## 2024-02-16 RX ORDER — BUMETANIDE 2 MG/1
2 TABLET ORAL DAILY
Qty: 10 TABLET | Refills: 0 | Status: SHIPPED | OUTPATIENT
Start: 2024-02-16 | End: 2024-02-23

## 2024-02-16 NOTE — TELEPHONE ENCOUNTER
"  Medication Question or Refill    Contacts         Type Contact Phone/Fax    02/16/2024 01:30 PM CST Phone (Incoming) Yoselyn Doyle (Self) [phone] (H)        Patient was prescribed this medication 12/15/23 while in the hospital following a n stemi.  Patient is now needing a refill, and has been out for about 10+ days.    Patient has an appointment on 2/23/24 with Dr. Edgardo Luong.  What medication are you calling about (include dose and sig)?:      Start End NAMRATA   bumetanide (BUMEX) 2 MG tablet    -- No   Sig - Route: Take 2 mg by mouth daily - Oral       Preferred Pharmacy:       [facility] [address] [place] [id number]  Phone: [phone] Fax: [phone]      Controlled Substance Agreement on file:   CSA -- Patient Level:    CSA: None found at the patient level.       Who prescribed the medication?: hospital    Do you need a refill? Yes \"has been out for about 10+ days\"    When did you use the medication last? 10+ days ago    Patient offered an appointment? Yes: scheduled to see Dr. Edgardo Luong on 2/23/24    Do you have any questions or concerns?   Patient concerned she should be on this, but has not been refilled.    Please Advise.    Could we send this information to you in [facility] or would you prefer to receive a phone call?:   Patient would prefer a phone call   Okay to leave a detailed message?: Yes at Home number on file [phone] (home) or  [phone] (Mobile)   "

## 2024-02-23 ENCOUNTER — ANTICOAGULATION THERAPY VISIT (OUTPATIENT)
Dept: ANTICOAGULATION | Facility: CLINIC | Age: 79
End: 2024-02-23

## 2024-02-23 ENCOUNTER — OFFICE VISIT (OUTPATIENT)
Dept: FAMILY MEDICINE | Facility: CLINIC | Age: 79
End: 2024-02-23
Payer: MEDICARE

## 2024-02-23 VITALS
WEIGHT: 148 LBS | HEART RATE: 71 BPM | BODY MASS INDEX: 27.94 KG/M2 | DIASTOLIC BLOOD PRESSURE: 85 MMHG | HEIGHT: 61 IN | SYSTOLIC BLOOD PRESSURE: 172 MMHG | OXYGEN SATURATION: 100 % | RESPIRATION RATE: 16 BRPM | TEMPERATURE: 97.9 F

## 2024-02-23 DIAGNOSIS — I25.810 CORONARY ARTERY DISEASE INVOLVING AUTOLOGOUS ARTERY CORONARY BYPASS GRAFT WITHOUT ANGINA PECTORIS: Primary | ICD-10-CM

## 2024-02-23 DIAGNOSIS — I21.4 NSTEMI (NON-ST ELEVATED MYOCARDIAL INFARCTION) (H): ICD-10-CM

## 2024-02-23 DIAGNOSIS — T81.49XA POSTOPERATIVE INFECTION OF WOUND OF STERNUM: ICD-10-CM

## 2024-02-23 DIAGNOSIS — I25.10 ATHEROSCLEROSIS OF NATIVE CORONARY ARTERY OF NATIVE HEART WITHOUT ANGINA PECTORIS: ICD-10-CM

## 2024-02-23 DIAGNOSIS — I48.0 PAROXYSMAL ATRIAL FIBRILLATION (H): ICD-10-CM

## 2024-02-23 DIAGNOSIS — E11.3293 TYPE 2 DIABETES MELLITUS WITH BOTH EYES AFFECTED BY MILD NONPROLIFERATIVE RETINOPATHY WITHOUT MACULAR EDEMA, WITH LONG-TERM CURRENT USE OF INSULIN (H): ICD-10-CM

## 2024-02-23 DIAGNOSIS — N18.32 STAGE 3B CHRONIC KIDNEY DISEASE (H): ICD-10-CM

## 2024-02-23 DIAGNOSIS — I10 PRIMARY HYPERTENSION: ICD-10-CM

## 2024-02-23 DIAGNOSIS — I48.0 PAROXYSMAL ATRIAL FIBRILLATION (H): Primary | ICD-10-CM

## 2024-02-23 DIAGNOSIS — Z79.4 TYPE 2 DIABETES MELLITUS WITH BOTH EYES AFFECTED BY MILD NONPROLIFERATIVE RETINOPATHY WITHOUT MACULAR EDEMA, WITH LONG-TERM CURRENT USE OF INSULIN (H): ICD-10-CM

## 2024-02-23 LAB — INR BLD: 2.3 (ref 0.9–1.1)

## 2024-02-23 PROCEDURE — 85610 PROTHROMBIN TIME: CPT | Mod: QW | Performed by: INTERNAL MEDICINE

## 2024-02-23 PROCEDURE — 36416 COLLJ CAPILLARY BLOOD SPEC: CPT | Performed by: INTERNAL MEDICINE

## 2024-02-23 PROCEDURE — 99214 OFFICE O/P EST MOD 30 MIN: CPT | Mod: 25 | Performed by: INTERNAL MEDICINE

## 2024-02-23 PROCEDURE — G0439 PPPS, SUBSEQ VISIT: HCPCS | Performed by: INTERNAL MEDICINE

## 2024-02-23 RX ORDER — BUMETANIDE 2 MG/1
2 TABLET ORAL DAILY
Qty: 90 TABLET | Refills: 3 | Status: SHIPPED | OUTPATIENT
Start: 2024-02-23 | End: 2024-04-09

## 2024-02-23 RX ORDER — WARFARIN SODIUM 2 MG/1
2-3 TABLET ORAL DAILY
Qty: 135 TABLET | Refills: 3 | Status: SHIPPED | OUTPATIENT
Start: 2024-02-23 | End: 2024-05-16

## 2024-02-23 RX ORDER — CARVEDILOL 6.25 MG/1
6.25 TABLET ORAL 2 TIMES DAILY WITH MEALS
Qty: 180 TABLET | Refills: 3 | Status: SHIPPED | OUTPATIENT
Start: 2024-02-23 | End: 2024-07-05

## 2024-02-23 RX ORDER — EZETIMIBE 10 MG/1
1 TABLET ORAL DAILY
COMMUNITY
Start: 2024-02-20 | End: 2024-04-09

## 2024-02-23 ASSESSMENT — SOCIAL DETERMINANTS OF HEALTH (SDOH): HOW OFTEN DO YOU GET TOGETHER WITH FRIENDS OR RELATIVES?: TWICE A WEEK

## 2024-02-23 NOTE — PROGRESS NOTES
ANTICOAGULATION MANAGEMENT     Yoselyn Doyle 78 year old female is on warfarin with therapeutic INR result. (Goal INR 2.0-3.0)    Recent labs: (last 7 days)     02/23/24  1536   INR 2.3*       ASSESSMENT     Source(s): Chart Review     Warfarin doses taken: Reviewed in chart  Diet: No new diet changes identified  Medication/supplement changes:  2/20/24 Start Zetia per [facility] cardiology, can increase INR per micromedex   New illness, injury, or hospitalization: No  Signs or symptoms of bleeding or clotting: No  Previous result: Therapeutic last 2(+) visits  Additional findings: None       PLAN     Recommended plan for ongoing change(s) affecting INR     Dosing Instructions: Continue your current warfarin dose with next INR in 2 weeks       Summary  As of 2/23/2024      Full warfarin instructions:  2 mg every Mon, Wed, Fri; 3 mg all other days   Next INR check:  3/8/2024               Detailed voice message left for Yoselyn with dosing instructions and follow up date.     Contact [phone] to schedule and with any changes, questions or concerns.     Education provided:   Goal range and lab monitoring: goal range and significance of current result  Contact [phone] with any changes, questions or concerns.     Plan made per [facility] anticoagulation protocol    Gisele Gillis, RN  Anticoagulation Clinic  2/23/2024    _______________________________________________________________________     Anticoagulation Episode Summary       Current INR goal:  2.0-3.0   TTR:  52.8% (1.5 mo)   Target end date:  Indefinite   Send INR reminders to:  [facility] [place]    Indications    Paroxysmal atrial fibrillation (H) [I48.0]             Comments:               Anticoagulation Care Providers       Provider Role Specialty Phone number    Chicho Luong MD Referring Internal Medicine [phone]

## 2024-02-23 NOTE — PROGRESS NOTES
Preventive Care Visit  [facility]  Chicho Luong MD, Internal Medicine  Feb 23, 2024    Assessment & Plan   Problem List Items Addressed This Visit          Endocrine    Type 2 diabetes mellitus with both eyes affected by mild nonproliferative retinopathy without macular edema, with long-term current use of insulin (H)     controlled   hypoglycemic medications: intolerant to metformin due to GI symptoms  insulin therapy: Toujeo 30 units, Humalog 12-15 units TID + SSI   - ICR 1:3, sensitivity 10 (~20 units q meal)   - using CGMS/Freestyle Adry and seeing significant benefits from use  last HbA1c: 7.9%   microvascular complications: proliferative retinopathy - sees ophtho regularly, + microalbuminuria  macrovascular complications: CAD  ASA/GILDA/statin:  ASA 325mg daily,  atorvastatin 40mg    - previous myalgias on higher dose of statin   - angioedema development on lisinopril and irbesartan    - avoidance of classes for now     Patient has met criteria for CGM coverage;   - patient has type 2 diabetes mellitus; and  - patient has been using a blood glucose monitor and performing four or more blood glucose test per day; and  - patient is insulin treated with multiple daily injections of three or more times per day; and  - within six months prior to ordering the CGM, the patient has had an in-person visit with the practitioner; and determined that criteria (1-4) above are met; and   - every six months following the initial prescription of the CGM, the treating practitioner has an in-person visit with the patient to assess adherence to their CGM regimen and diabetes treatment plan             Circulatory    Atherosclerosis of native coronary artery of native heart without angina pectoris     12/2023: NSTEMI presentation, admitted to [facility] -undergoing subsequent CABG   - Post off pump coronary artery bypass grafting x 4: MARYLOU to LAD, sequential saphenous vein graft to diagonal and OM,  saphenous vein graft to PDA, endoscopic vein harvest of left lower extremity, left atrial appendage occlusion with 45 mm Atriclip, direct current cardioversion on 12/19/2023   - on carvedilol 6.25mg BID  - ASA + clopidogrel (recs for DAPT for 1 yr) + warfarin  - h/o statin intolerance (concerns with LFTs elevation as well)   - 2/2024: started on ezetimibe  - referring to [facility] cardiology for longer term cardiology cares           Primary hypertension     uncontrolled  current antihypertensive regimen: carvedilol 6.25mg BID, bumetanide 2mg daily  regimen changes: none  Intolerance: ACEi/ARB - angioedema hx  future titration/work-up plan:   Prior to CABG, Previously maintained on atenolol/chlorthalidone 50/25mg, amlodipine 5mg,          Paroxysmal atrial fibrillation (H)     Perioperative atrial fibrillation after bypass  On warfarin - indefinite timeline for now         Relevant Medications    warfarin ANTICOAGULANT (COUMADIN) 2 MG tablet    carvedilol (COREG) 6.25 MG tablet    Other Relevant Orders    Adult Cardiology Eval  Referral       Musculoskeletal and Integumentary    Postoperative infection of wound of sternum     Readmitted for superficial sternal wound infection  - responsive to oral antibiotics without need for parenteral antibiotics, advanced wound cares            Urinary    Stage 3b chronic kidney disease (H)     Baseline SCr 1.5-1.7  - post CABG rise in Scr to 2.8 (stabilized ~2-2.2)  - not maintained on ACEi/ARB (prior angioedema hx)          Other Visit Diagnoses       Coronary artery disease involving autologous artery coronary bypass graft without angina pectoris    -  Primary    Relevant Orders    Adult Cardiology Eval  Referral    NSTEMI (non-ST elevated myocardial infarction) (H)        Relevant Medications    bumetanide (BUMEX) 2 MG tablet    Other Relevant Orders    Adult Cardiology Eval  Referral              MED REC REQUIRED  Post Medication Reconciliation Status:   "Discharge medications reconciled, continue medications without change  BMI  Estimated body mass index is 27.96 kg/m  as calculated from the following:    Height as of this encounter: 1.549 m (5' 1\").    Weight as of this encounter: 67.1 kg (148 lb).       Counseling  Appropriate preventive services were discussed with this patient, including applicable screening as appropriate for fall prevention, nutrition, physical activity, Tobacco-use cessation, weight loss and cognition.  Checklist reviewing preventive services available has been given to the patient.  Reviewed patient's diet, addressing concerns and/or questions.   The patient was instructed to see the dentist every 6 months.   She is at risk for psychosocial distress and has been provided with information to reduce risk.   I have reviewed Opioid Use Disorder and Substance Use Disorder risk factors and made any needed referrals.     FUTURE APPOINTMENTS:       - Follow-up visit in 6 months      Renae Topete is a 78 year old, presenting for the following: Returns for follow-up.  This my first time seeing her since her non-STEMI presentation leading to CABG surgery in late December 2023.  Subsequently readmitted due to a superficial sternal infection which is subsequently resolved after short-term use of oral antibiotics.  She generally has been doing well from a cardiac standpoint.  More recently seen by [facility] cardiology; she has maintained on bumetanide 2 mg daily; had been on higher diuretic dosing after her bypass surgery.  Still with some degree of pitting lower extremity edema.  Weights remained relatively stable around 145 pounds since her hospital discharge.  Good overall glycemic control since discharge.  Did have some acute kidney injury after bypass surgery; followed by nephrology in a postoperative timeframe.  Discharge creatinine around 2.4.  Shares having [facility] nephrology follow-up scheduled for this coming week.  Medicare Visit and Hospital F/U " (CABG)        2/23/2024     2:38 PM   Additional Questions   Roomed by Nilam FLOWERS   Accompanied by YULISA         Health Care Directive  Patient does not have a Health Care Directive or Living Will: Patient states has Advance Directive and will bring in a copy to clinic.    HPI          2/23/2024   General Health   How would you rate your overall physical health? Good   Feel stress (tense, anxious, or unable to sleep) Only a little   (!) STRESS CONCERN      2/23/2024   Nutrition   Diet: Low salt    Diabetic    Carbohydrate counting          No data to display                  2/23/2024   Social Factors   Frequency of gathering with friends or relatives Twice a week   Worry food won't last until get money to buy more No   Food not last or not have enough money for food? No   Do you have housing?  Yes   Are you worried about losing your housing? No   Lack of transportation? No   Unable to get utilities (heat,electricity)? No         2/23/2024   Fall Risk   Fallen 2 or more times in the past year? No   Trouble with walking or balance? No          2/23/2024   Activities of Daily Living- Home Safety   Needs help with the following daily activites None of the above   Safety concerns in the home None of the above         2/23/2024   Dental   Dentist two times every year? (!) NO         2/23/2024   Hearing Screening   Hearing concerns? None of the above         2/23/2024   Driving Risk Screening   Patient/family members have concerns about driving No         2/23/2024   General Alertness/Fatigue Screening   Have you been more tired than usual lately? No         2/23/2024   Urinary Incontinence Screening   Bothered by leaking urine in past 6 months No            Today's PHQ-2 Score:       2/23/2024     2:21 PM   PHQ-2 ( 1999 [employer])   Q1: Little interest or pleasure in doing things 0   Q2: Feeling down, depressed or hopeless 0   PHQ-2 Score 0   Q1: Little interest or pleasure in doing things Not at all   Q2: Feeling down,  depressed or hopeless Not at all   PHQ-2 Score 0           2/23/2024   Substance Use   Alcohol more than 3/day or more than 7/wk Not Applicable   Do you have a current opioid prescription? (!) YES   How severe/bad is pain from 1 to 10? 1/10   Do you use any other substances recreationally? No          No data to display              Low Risk (0-3)  Moderate Risk (4-7)  High Risk (>8)  Social History     Tobacco Use    Smoking status: Former     Types: Cigarettes     Passive exposure: Never    Smokeless tobacco: Never    Tobacco comments:     12/00 aprox 30 pack years   Vaping Use    Vaping Use: Never used   Substance Use Topics    Alcohol use: No    Drug use: No          Mammogram Screening - Mammography discussed and declined    ASCVD Risk   The ASCVD Risk score (Wm DK, et al., 2019) failed to calculate for the following reasons:    The patient has a prior MI or stroke diagnosis          Reviewed and updated as needed this visit by Provider                    Current providers sharing in care for this patient include:  Patient Care Team:  Chicho Luong MD as PCP - General (HOSPITALIST)  Bahman Nix MD as PCP - Ophthalmology  Chicho Luong MD as Assigned PCP    The following health maintenance items are reviewed in Epic and correct as of today:  Health Maintenance   Topic Date Due    ADVANCE CARE PLANNING  Never done    ZOSTER IMMUNIZATION (1 of 2) Never done    RSV VACCINE (Pregnancy & 60+) (1 - 1-dose 60+ series) Never done    MEDICARE ANNUAL WELLNESS VISIT  Never done    DTAP/TDAP/TD IMMUNIZATION (3 - Td or Tdap) 01/01/2021    EYE EXAM  08/25/2023    INFLUENZA VACCINE (1) Never done    COVID-19 Vaccine (3 - 2023-24 season) 09/01/2023    ANNUAL REVIEW OF HM ORDERS  10/17/2023    LIPID  10/21/2023    DIABETIC FOOT EXAM  11/03/2023    A1C  12/15/2023    MICROALBUMIN  06/29/2024    LUNG CANCER SCREENING  01/06/2025    BMP  01/24/2025    HEMOGLOBIN  01/24/2025    FALL RISK ASSESSMENT   "02/23/2025    DEXA  02/28/2033    PARATHYROID  Completed    PHOSPHORUS  Completed    HEPATITIS C SCREENING  Completed    PHQ-2 (once per calendar year)  Completed    Pneumococcal Vaccine: 65+ Years  Completed    URINALYSIS  Completed    ALK PHOS  Completed    IPV IMMUNIZATION  Aged Out    HPV IMMUNIZATION  Aged Out    MENINGITIS IMMUNIZATION  Aged Out    RSV MONOCLONAL ANTIBODY  Aged Out    MAMMO SCREENING  Discontinued         Review of Systems  Constitutional, HEENT, cardiovascular, pulmonary, gi and gu systems are negative, except as otherwise noted.     Objective    Exam  BP (!) 159/85   Pulse 71   Temp 97.9  F (36.6  C)   Resp 16   Ht 1.549 m (5' 1\")   Wt 67.1 kg (148 lb)   LMP  (LMP Unknown)   SpO2 100%   BMI 27.96 kg/m     Estimated body mass index is 27.96 kg/m  as calculated from the following:    Height as of this encounter: 1.549 m (5' 1\").    Weight as of this encounter: 67.1 kg (148 lb).    Physical Exam  GENERAL: alert and no distress  NECK: no adenopathy, no asymmetry, masses, or scars  RESP: lungs clear to auscultation - no rales, rhonchi or wheezes  CV: regular rate and rhythm, normal S1 S2, no S3 or S4, no murmur, click or rub, 1+ peripheral edema; sternotomy incision well-healed without dehiscence or drainage; no erythema  ABDOMEN: soft, nontender, no hepatosplenomegaly, no masses and bowel sounds normal  MS: no gross musculoskeletal defects noted, no edema        2/23/2024   Mini Cog   Clock Draw Score 2 Normal   3 Item Recall 1 object recalled   Mini Cog Total Score 3            Signed Electronically by: Chicho Luong MD    "

## 2024-02-26 NOTE — ASSESSMENT & PLAN NOTE
Baseline SCr 1.5-1.7  - post CABG rise in Scr to 2.8 (stabilized ~2-2.2)  - not maintained on ACEi/ARB (prior angioedema hx)

## 2024-02-26 NOTE — ASSESSMENT & PLAN NOTE
Readmitted for superficial sternal wound infection  - responsive to oral antibiotics without need for parenteral antibiotics, advanced wound cares

## 2024-02-26 NOTE — ASSESSMENT & PLAN NOTE
12/2023: NSTEMI presentation, admitted to [facility] -undergoing subsequent CABG   - Post off pump coronary artery bypass grafting x 4: MARYLOU to LAD, sequential saphenous vein graft to diagonal and OM, saphenous vein graft to PDA, endoscopic vein harvest of left lower extremity, left atrial appendage occlusion with 45 mm Atriclip, direct current cardioversion on 12/19/2023   - on carvedilol 6.25mg BID  - ASA + clopidogrel (recs for DAPT for 1 yr) + warfarin  - h/o statin intolerance (concerns with LFTs elevation as well)   - 2/2024: started on ezetimibe  - referring to [facility] cardiology for longer term cardiology cares

## 2024-02-26 NOTE — ASSESSMENT & PLAN NOTE
uncontrolled  current antihypertensive regimen: carvedilol 6.25mg BID, bumetanide 2mg daily  regimen changes: none  Intolerance: ACEi/ARB - angioedema hx  future titration/work-up plan:   Prior to CABG, Previously maintained on atenolol/chlorthalidone 50/25mg, amlodipine 5mg,

## 2024-03-05 ENCOUNTER — LAB (OUTPATIENT)
Dept: LAB | Facility: CLINIC | Age: 79
End: 2024-03-05
Payer: MEDICARE

## 2024-03-05 ENCOUNTER — ANTICOAGULATION THERAPY VISIT (OUTPATIENT)
Dept: ANTICOAGULATION | Facility: CLINIC | Age: 79
End: 2024-03-05

## 2024-03-05 DIAGNOSIS — I48.0 PAROXYSMAL ATRIAL FIBRILLATION (H): ICD-10-CM

## 2024-03-05 DIAGNOSIS — N18.31 CHRONIC KIDNEY DISEASE, STAGE 3A (H): ICD-10-CM

## 2024-03-05 DIAGNOSIS — I48.0 PAROXYSMAL ATRIAL FIBRILLATION (H): Primary | ICD-10-CM

## 2024-03-05 DIAGNOSIS — Z13.220 SCREENING FOR HYPERLIPIDEMIA: Primary | ICD-10-CM

## 2024-03-05 DIAGNOSIS — E11.3299 NONPROLIFERATIVE DIABETIC RETINOPATHY (H): ICD-10-CM

## 2024-03-05 DIAGNOSIS — I25.10 ATHEROSCLEROSIS OF NATIVE CORONARY ARTERY OF NATIVE HEART WITHOUT ANGINA PECTORIS: ICD-10-CM

## 2024-03-05 DIAGNOSIS — N18.31 CHRONIC KIDNEY DISEASE, STAGE 3A (H): Primary | ICD-10-CM

## 2024-03-05 LAB
HBA1C MFR BLD: 8.6 % (ref 0–5.6)
HGB BLD-MCNC: 10.7 G/DL (ref 11.7–15.7)
INR BLD: 2.5 (ref 0.9–1.1)

## 2024-03-05 PROCEDURE — 85018 HEMOGLOBIN: CPT | Mod: QW

## 2024-03-05 PROCEDURE — 83036 HEMOGLOBIN GLYCOSYLATED A1C: CPT | Mod: GA

## 2024-03-05 PROCEDURE — 85610 PROTHROMBIN TIME: CPT | Mod: QW

## 2024-03-05 PROCEDURE — 80061 LIPID PANEL: CPT

## 2024-03-05 PROCEDURE — 36415 COLL VENOUS BLD VENIPUNCTURE: CPT

## 2024-03-05 PROCEDURE — 83970 ASSAY OF PARATHORMONE: CPT

## 2024-03-05 PROCEDURE — 84156 ASSAY OF PROTEIN URINE: CPT

## 2024-03-05 NOTE — PROGRESS NOTES
ANTICOAGULATION MANAGEMENT     Yoselyn Doyle 78 year old female is on warfarin with therapeutic INR result. (Goal INR 2.0-3.0)    Recent labs: (last 7 days)     03/05/24  1137   INR 2.5*       ASSESSMENT     Source(s): Chart Review and Patient/Caregiver Call     Warfarin doses taken: Warfarin taken as instructed  Diet: No new diet changes identified  Medication/supplement changes:  Zetia started on 2/20/24 Per Micromedex concurrent use of ezetimibe/zetia and warfarin may result in risk of increased prothrombin time or INR. Response can be delayed  New illness, injury, or hospitalization: No  Signs or symptoms of bleeding or clotting: No  Previous result: Therapeutic last 2(+) visits  Additional findings: None       PLAN     Recommended plan for ongoing change(s) affecting INR     Dosing Instructions: Continue your current warfarin dose with next INR in 2 weeks       Summary  As of 3/5/2024      Full warfarin instructions:  2 mg every Mon, Wed, Fri; 3 mg all other days   Next INR check:  3/19/2024               Telephone call with Yoselyn who verbalizes understanding and agrees to plan    Lab visit scheduled    Education provided:   Goal range and lab monitoring: goal range and significance of current result and Importance of therapeutic range  Interaction IS anticipated between warfarin and zetia    Plan made per [facility] anticoagulation protocol    Natan Anders RN  Anticoagulation Clinic  3/5/2024    _______________________________________________________________________     Anticoagulation Episode Summary       Current INR goal:  2.0-3.0   TTR:  61.7% (1.9 mo)   Target end date:  Indefinite   Send INR reminders to:  [facility] [place]    Indications    Paroxysmal atrial fibrillation (H) [I48.0]             Comments:               Anticoagulation Care Providers       Provider Role Specialty Phone number    Chicho Luong MD Referring Internal Medicine [phone]

## 2024-03-06 LAB
ALBUMIN MFR UR ELPH: 6.7 MG/DL
CHOLEST SERPL-MCNC: 213 MG/DL
CREAT UR-MCNC: 59.7 MG/DL
FASTING STATUS PATIENT QL REPORTED: NO
HDLC SERPL-MCNC: 59 MG/DL
LDLC SERPL CALC-MCNC: 124 MG/DL
NONHDLC SERPL-MCNC: 154 MG/DL
PROT/CREAT 24H UR: 0.11 MG/MG CR (ref 0–0.2)
PTH-INTACT SERPL-MCNC: 88 PG/ML (ref 15–65)
TRIGL SERPL-MCNC: 152 MG/DL

## 2024-03-19 ENCOUNTER — LAB (OUTPATIENT)
Dept: LAB | Facility: CLINIC | Age: 79
End: 2024-03-19
Payer: MEDICARE

## 2024-03-19 ENCOUNTER — ANTICOAGULATION THERAPY VISIT (OUTPATIENT)
Dept: ANTICOAGULATION | Facility: CLINIC | Age: 79
End: 2024-03-19

## 2024-03-19 DIAGNOSIS — I48.0 PAROXYSMAL ATRIAL FIBRILLATION (H): ICD-10-CM

## 2024-03-19 DIAGNOSIS — I48.0 PAROXYSMAL ATRIAL FIBRILLATION (H): Primary | ICD-10-CM

## 2024-03-19 LAB — INR BLD: 2.2 (ref 0.9–1.1)

## 2024-03-19 PROCEDURE — 85610 PROTHROMBIN TIME: CPT | Mod: QW

## 2024-03-19 PROCEDURE — 36416 COLLJ CAPILLARY BLOOD SPEC: CPT

## 2024-03-19 NOTE — PROGRESS NOTES
ANTICOAGULATION MANAGEMENT     Yoselyn Doyle 78 year old female is on warfarin with therapeutic INR result. (Goal INR 2.0-3.0)    Recent labs: (last 7 days)     03/19/24  1254   INR 2.2*       ASSESSMENT     Source(s): Chart Review and Patient/Caregiver Call     Warfarin doses taken: Warfarin taken as instructed  Diet: No new diet changes identified  Medication/supplement changes: None noted  New illness, injury, or hospitalization: No  Signs or symptoms of bleeding or clotting: No  Previous result: Therapeutic last 2(+) visits  Additional findings: None       PLAN     Recommended plan for no diet, medication or health factor changes affecting INR     Dosing Instructions: Continue your current warfarin dose with next INR in 2 weeks as already scheduled    Summary  As of 3/19/2024      Full warfarin instructions:  2 mg every Mon, Wed, Fri; 3 mg all other days   Next INR check:  4/2/2024               Telephone call with Yoselyn who verbalizes understanding and agrees to plan    Patient already scheduled appt    Education provided:   Contact [phone] with any changes, questions or concerns.     Plan made per [facility] anticoagulation protocol    Alka Gong RN  Anticoagulation Clinic  3/19/2024    _______________________________________________________________________     Anticoagulation Episode Summary       Current INR goal:  2.0-3.0   TTR:  69.2% (2.4 mo)   Target end date:  Indefinite   Send INR reminders to:  [facility] [place]    Indications    Paroxysmal atrial fibrillation (H) [I48.0]             Comments:               Anticoagulation Care Providers       Provider Role Specialty Phone number    Chicho Luong MD Referring Internal Medicine [phone]

## 2024-04-02 ENCOUNTER — ANTICOAGULATION THERAPY VISIT (OUTPATIENT)
Dept: ANTICOAGULATION | Facility: CLINIC | Age: 79
End: 2024-04-02

## 2024-04-02 ENCOUNTER — LAB (OUTPATIENT)
Dept: LAB | Facility: CLINIC | Age: 79
End: 2024-04-02
Payer: MEDICARE

## 2024-04-02 DIAGNOSIS — I48.0 PAROXYSMAL ATRIAL FIBRILLATION (H): ICD-10-CM

## 2024-04-02 DIAGNOSIS — I48.0 PAROXYSMAL ATRIAL FIBRILLATION (H): Primary | ICD-10-CM

## 2024-04-02 LAB — INR BLD: 2 (ref 0.9–1.1)

## 2024-04-02 PROCEDURE — 36416 COLLJ CAPILLARY BLOOD SPEC: CPT

## 2024-04-02 PROCEDURE — 85610 PROTHROMBIN TIME: CPT | Mod: QW

## 2024-04-02 NOTE — PROGRESS NOTES
ANTICOAGULATION MANAGEMENT     Yoselyn Doyle 78 year old female is on warfarin with therapeutic INR result. (Goal INR 2.0-3.0)    Recent labs: (last 7 days)     04/02/24  1312   INR 2.0*       ASSESSMENT     Source(s): Chart Review  Previous INR was Therapeutic last 2(+) visits  Medication, diet, health changes since last INR chart reviewed; none identified         PLAN     Recommended plan for no diet, medication or health factor changes affecting INR     Dosing Instructions: Continue your current warfarin dose with next INR in 3-4 weeks       Summary  As of 4/2/2024      Full warfarin instructions:  2 mg every Mon, Wed, Fri; 3 mg all other days   Next INR check:  4/30/2024               Detailed voice message left for Yoselyn with dosing instructions and follow up date.     Contact [phone] to schedule and with any changes, questions or concerns.     Education provided:   Goal range and lab monitoring: goal range and significance of current result  Contact [phone] with any changes, questions or concerns.     Plan made per [facility] anticoagulation protocol    Gisele Gillis RN  Anticoagulation Clinic  4/2/2024    _______________________________________________________________________     Anticoagulation Episode Summary       Current INR goal:  2.0-3.0   TTR:  74.3% (2.8 mo)   Target end date:  Indefinite   Send INR reminders to:  [facility] [place]    Indications    Paroxysmal atrial fibrillation (H) [I48.0]             Comments:               Anticoagulation Care Providers       Provider Role Specialty Phone number    Chicho Luong MD Referring Internal Medicine [phone]

## 2024-04-09 ENCOUNTER — ORDERS ONLY (AUTO-RELEASED) (OUTPATIENT)
Dept: CARDIOLOGY | Facility: CLINIC | Age: 79
End: 2024-04-09
Payer: MEDICARE

## 2024-04-09 ENCOUNTER — OFFICE VISIT (OUTPATIENT)
Dept: CARDIOLOGY | Facility: CLINIC | Age: 79
End: 2024-04-09
Attending: INTERNAL MEDICINE
Payer: MEDICARE

## 2024-04-09 VITALS
WEIGHT: 145.2 LBS | SYSTOLIC BLOOD PRESSURE: 116 MMHG | HEART RATE: 67 BPM | BODY MASS INDEX: 27.44 KG/M2 | DIASTOLIC BLOOD PRESSURE: 60 MMHG | RESPIRATION RATE: 20 BRPM | OXYGEN SATURATION: 95 %

## 2024-04-09 DIAGNOSIS — I48.91 POSTOPERATIVE ATRIAL FIBRILLATION (H): ICD-10-CM

## 2024-04-09 DIAGNOSIS — I21.4 NSTEMI (NON-ST ELEVATED MYOCARDIAL INFARCTION) (H): Primary | ICD-10-CM

## 2024-04-09 DIAGNOSIS — I48.0 PAROXYSMAL ATRIAL FIBRILLATION (H): ICD-10-CM

## 2024-04-09 DIAGNOSIS — I25.810 CORONARY ARTERY DISEASE INVOLVING AUTOLOGOUS ARTERY CORONARY BYPASS GRAFT WITHOUT ANGINA PECTORIS: ICD-10-CM

## 2024-04-09 DIAGNOSIS — I97.89 POSTOPERATIVE ATRIAL FIBRILLATION (H): ICD-10-CM

## 2024-04-09 DIAGNOSIS — Z78.9 STATIN INTOLERANCE: ICD-10-CM

## 2024-04-09 DIAGNOSIS — N18.32 STAGE 3B CHRONIC KIDNEY DISEASE (H): ICD-10-CM

## 2024-04-09 PROCEDURE — 99204 OFFICE O/P NEW MOD 45 MIN: CPT | Performed by: INTERNAL MEDICINE

## 2024-04-09 PROCEDURE — 93248 EXT ECG>7D<15D REV&INTERPJ: CPT | Performed by: INTERNAL MEDICINE

## 2024-04-09 RX ORDER — POLYETHYLENE GLYCOL 3350 17 G/17G
1 POWDER, FOR SOLUTION ORAL DAILY
COMMUNITY

## 2024-04-09 RX ORDER — BUMETANIDE 2 MG/1
2 TABLET ORAL EVERY OTHER DAY
COMMUNITY
Start: 2024-04-09 | End: 2024-07-05

## 2024-04-09 RX ORDER — EZETIMIBE 10 MG/1
10 TABLET ORAL DAILY
COMMUNITY
Start: 2024-04-09

## 2024-04-09 NOTE — LETTER
4/9/2024    Chicho Luong MD  [address] [facility] [address]    RE: Yoselyn Doyle       Dear Colleague,     I had the [facility] of seeing [facility].    HEART CARE ENCOUNTER CONSULTATON NOTE      [facility]  [phone]      Assessment/Recommendations   Assessment:   Coronary artery disease status post coronary artery bypass surgery December 2023 ([facility])   2.  Postoperative atrial fibrillation.  No prior history of A-fib CT surgery  3.  Hyperlipidemia  4.  Statin intolerance (patient has tried rosuvastatin pravastatin, atorvastatin).  All have resulted in severe myalgias  5.  Diabetes mellitus type 2, insulin-dependent, retinopathy, chronic kidney disease  6.  Chronic kidney disease stage IIIb  7.  Ischemic cardiomyopathy, ejection fraction 40 to 50%.      Plan:   1.  Reduce Bumex to 2 mg every other day  2.  Zio patch cardiac monitor to determine if patient has any other paroxysmal atrial fibs events.  If no events on cardiac monitor will discontinue warfarin therapy.  She has postoperative A-fib  3.  Continue carvedilol 6.25 milligrams daily  4.  Continue Zetia.  Repeat lipids in 2 months if LDL greater than 70 would recommend institution of a PCSK9 inhibitor.  Discussed patient  5.  Continue Plavix for 12 months.  Start aspirin 81 mg daily if warfarin discontinued  6.  Recommend repeat after next follow-up.  Reassess ischemic cardiomyopathy after revascularization       History of Present Illness/Subjective    HPI: Yoselyn Doyle is a 78 year old female recent history of non-ST elevated myocardial infarction in December 2023 resulting in need for coronary artery bypass surgery with occluded proximal right coronary artery and obtuse marginal branch with severe disease throughout the LAD.  She underwent successful coronary artery bypass surgery.  She did have post operative infection which has resolved.  She also had postoperative atrial fibrillation  which is common.  She is currently on warfarin therapy with relatively stable INRs.  Discussed postoperative atrial fibrillation in detail with the patient.  No prior history of A-fib.  Plan will be to obtain a cardiac monitor to determine if she has any evidence of paroxysmal atrial fibrillation.  If no evidence of A-fib will discontinue warfarin therapy given risk and benefits.    Coronary angiogram, echocardiogram was reviewed from [facility].  Discharge summary from CT surgery reviewed.  Recent labs reviewed.       Physical Examination  Review of Systems   Vitals: /60 (BP Location: Left arm, Patient Position: Sitting, Cuff Size: Adult Regular)   Pulse 67   Resp 20   Wt 65.9 kg (145 lb 3.2 oz)   LMP  (LMP Unknown)   SpO2 95%   BMI 27.44 kg/m    BMI= Body mass index is 27.44 kg/m .  Wt Readings from Last 3 Encounters:   04/09/24 65.9 kg (145 lb 3.2 oz)   02/23/24 67.1 kg (148 lb)   12/29/23 73.5 kg (162 lb)        Pleasant   ENT/Mouth: membranes moist, no oral lesions or bleeding gums.      EYES:  no scleral icterus, normal conjunctivae       Chest/Lungs:   lungs are clear to auscultation, no rales or wheezing, healed sternal scar, equal chest wall expansion    Cardiovascular:   Regular. Normal first and second heart sounds with no murmurs, rubs, or gallops; the carotid, radial and posterior tibial pulses are intact, Jugular venous pressure noraml, no edema bilaterally    Abdomen:  no tenderness; bowel sounds are present   Extremities: no cyanosis or clubbing   Skin: no xanthelasma, warm.    Neurologic: normal  bilateral, no tremors     Psychiatric: alert and oriented x3, calm        Please refer above for cardiac ROS details.        Medical History  Surgical History Family History Social History   Past Medical History:   Diagnosis Date    Coronary atherosclerosis of unspecified type of vessel, native or graft     Coronary artery disease    Infection due to 2019 novel coronavirus 08/29/2023     Open-angle glaucoma, unspecified(365.10)     Type II or unspecified type diabetes mellitus without mention of complication, not stated as uncontrolled     Diabetes mellitus     Past Surgical History:   Procedure Laterality Date    HC DILATION/CURETTAGE DIAG/THER NON OB  65,71    D & C    HC REMOVAL GALLBLADDER  1968    HC REMOVAL OF TONSILS,<10 Y/O      HC REMV CATARACT EXTRACAP,INSERT LENS, W/O ECP  12/20/06    right eye    HC REMV CATARACT EXTRACAP,INSERT LENS, W/O ECP  7/19/13    left eye    ZZC APPENDECTOMY  1960    ZZC GLAUCOMA SURG,TRABECU AB EXTERNO  03/28/06    right eye    ZZC TOTAL ABDOM HYSTERECTOMY  1975     Family History   Problem Relation Age of Onset    Diabetes Mother         diabetes        Social History     Socioeconomic History    Marital status:      Spouse name: Not on file    Number of children: Not on file    Years of education: Not on file    Highest education level: Not on file   Occupational History    Not on file   Tobacco Use    Smoking status: Former     Types: Cigarettes     Passive exposure: Never    Smokeless tobacco: Never    Tobacco comments:     12/00 aprox 30 pack years   Vaping Use    Vaping Use: Never used   Substance and Sexual Activity    Alcohol use: No    Drug use: No    Sexual activity: Yes     Comment: monogomous with  39 years   Other Topics Concern     Service Not Asked    Blood Transfusions No     Comment: hep c neg    Caffeine Concern Not Asked    Occupational Exposure Not Asked    Hobby Hazards Not Asked    Sleep Concern Not Asked    Stress Concern Not Asked    Weight Concern Not Asked    Special Diet Not Asked    Back Care Not Asked    Exercise Not Asked    Bike Helmet Not Asked    Seat Belt Not Asked    Self-Exams Not Asked   Social History Narrative    Not on file     Social Determinants of Health     Financial Resource Strain: Low Risk  (2/23/2024)    Financial Resource Strain     Within the past 12 months, have you or your family members  you live with been unable to get utilities (heat, electricity) when it was really needed?: No   Food Insecurity: Low Risk  (2/23/2024)    Food Insecurity     Within the past 12 months, did you worry that your food would run out before you got money to buy more?: No     Within the past 12 months, did the food you bought just not last and you didn t have money to get more?: No   Transportation Needs: Low Risk  (2/23/2024)    Transportation Needs     Within the past 12 months, has lack of transportation kept you from medical appointments, getting your medicines, non-medical meetings or appointments, work, or from getting things that you need?: No   Physical Activity: Not on file   Stress: No Stress Concern Present (2/23/2024)    [place] [employer] - Occupational Stress Questionnaire     Feeling of Stress : Only a little   Social Connections: Unknown (2/23/2024)    Social Connection and Isolation Panel [NHANES]     Frequency of Communication with Friends and Family: Not on file     Frequency of Social Gatherings with Friends and Family: Twice a week     Attends Faith Services: Not on file     Active Member of Clubs or Organizations: Not on file     Attends Club or Organization Meetings: Not on file     Marital Status: Not on file   Interpersonal Safety: Low Risk  (2/23/2024)    Interpersonal Safety     Do you feel physically and emotionally safe where you currently live?: Yes     Within the past 12 months, have you been hit, slapped, kicked or otherwise physically hurt by someone?: No     Within the past 12 months, have you been humiliated or emotionally abused in other ways by your partner or ex-partner?: No   Housing Stability: Low Risk  (2/23/2024)    Housing Stability     Do you have housing? : Yes     Are you worried about losing your housing?: No           Medications  Allergies   Current Outpatient Medications   Medication Sig Dispense Refill    bumetanide (BUMEX) 2 MG tablet Take 1  tablet (2 mg) by mouth every other day      carvedilol (COREG) 6.25 MG tablet Take 1 tablet (6.25 mg) by mouth 2 times daily (with meals) 180 tablet 3    clopidogrel (PLAVIX) 75 MG tablet Take 1 tablet (75 mg) by mouth every morning 90 tablet 3    ezetimibe (ZETIA) 10 MG tablet Take 1 tablet (10 mg) by mouth daily      guaiFENesin (ROBITUSSIN) 20 mg/mL liquid Take 100 mg by mouth      insulin glargine U-300 (TOUJEO MAX SOLOSTAR) 300 UNIT/ML (2 units dial) pen INJECT 36 UNITS SUBCUTANEOUSLY ONCE DAILY (Patient taking differently: 20 Units at bedtime INJECT 15-20 UNITS SUBCUTANEOUSLY ONCE DAILY) 12 mL 3    nitroGLYcerin (NITROSTAT) 0.4 MG sublingual tablet DISSOLVE ONE TABLET UNDER THE TONGUE EVERY 5 MINUTES AS NEEDED FOR CHEST PAIN.      NOVOLOG FLEXPEN 100 UNIT/ML soln INJECT 12 TO 15 UNITS SUBCUTANEOUSLY THREE TIMES DAILY BEFORE MEAL(S) (Patient taking differently: 15-20 Units 3 times daily (with meals) INJECT 12 TO 15 UNITS SUBCUTANEOUSLY THREE TIMES DAILY BEFORE MEAL(S)) 30 mL 3    omeprazole (PRILOSEC) 20 MG DR capsule Take 20 mg by mouth every morning      pantoprazole (PROTONIX) 40 MG EC tablet Take 1 tablet by mouth once daily 90 tablet 2    polyethylene glycol (MIRALAX) 17 GM/Dose powder Take 1 Capful by mouth daily      warfarin ANTICOAGULANT (COUMADIN) 2 MG tablet Take 1-1.5 tablets (2-3 mg) by mouth daily Adjust dose per INR as directed by  tablet 3       Allergies   Allergen Reactions    Rosiglitazone Maleate Shortness Of Breath     avandia- breathing trouble    Diphenhydramine Swelling    Irbesartan-Hydrochlorothiazide      Other reaction(s): Angioedema    Lisinopril      Other reaction(s): Angioedema    Macrolides And Ketolides      breathing trouble (zithromax)    Metformin Other (See Comments)    Moxifloxacin Other (See Comments)    Nsaids      naprosyn-hives    Nuts Itching    Penicillins      hives    Pioglitazone Hydrochloride Other (See Comments) and Swelling     actos- gi upset, cough     "Prednisone      hives    Sulfa Antibiotics      hives          Lab Results    Chemistry/lipid CBC Cardiac Enzymes/BNP/TSH/INR   Recent Labs   Lab Test 03/05/24  1137 10/21/22  0809 08/07/21  0910   CHOL 213*   < > 172   HDL 59   < > 60   *   < > 91   TRIG 152*   < > 118   CHOLHDLRATIO  --   --  2.9    < > = values in this interval not displayed.     Recent Labs   Lab Test 03/05/24  1137 10/21/22  0809 08/07/21  0910   * 85 91     Recent Labs   Lab Test 01/24/24  1333      POTASSIUM 4.1   CHLORIDE 97*   CO2 26   *   BUN 37.3*   CR 2.26*   GFRESTIMATED 21*   JACK 9.6     Recent Labs   Lab Test 01/24/24  1333 12/29/23  1144 06/15/23  0802   CR 2.26* 2.32* 1.63*     Recent Labs   Lab Test 03/05/24  1137 06/15/23  0802 10/21/22  0809   A1C 8.6* 7.9* 8.1*          Recent Labs   Lab Test 03/05/24  1137 01/24/24  1333   WBC  --  10.9   HGB 10.7* 10.5*   HCT  --  33.7*   MCV  --  90   PLT  --  283     Recent Labs   Lab Test 03/05/24  1137 01/24/24  1333 06/15/23  0802   HGB 10.7* 10.5* 12.0    No results for input(s): \"TROPONINI\" in the last 42951 hours.  No results for input(s): \"BNP\", \"NTBNPI\", \"NTBNP\" in the last 36277 hours.  No results for input(s): \"TSH\" in the last 66023 hours.  Recent Labs   Lab Test 04/02/24  1312 03/19/24  1254 03/05/24  1137   INR 2.0* 2.2* 2.5*        Lucio Espinoza DO        Thank you for allowing me to participate in the care of your patient.      Sincerely,     Lucio Espinoza DO     [facility]  cc:   Chicho Luong MD  [facility] [place] [address]      "

## 2024-04-09 NOTE — PATIENT INSTRUCTIONS
Please contact direct nurses line Monday through Friday 8 AM to 5 PM @ [phone]    After-hours contact cardiology office at [phone].    Plan:    Reduce bumex to 2 mg every other day   14 day cardiac monitor (ZIO).  If no atrial fib events will discontinue warfarin   Will follow-up on lipid. Goal LDL < 70.  If remains elevated would consider PCSK9-inhibitors (Repatha or Praluent).   Follow-up in 3 months

## 2024-04-09 NOTE — PROGRESS NOTES
HEART CARE ENCOUNTER CONSULTATON NOTE      [facility]  [phone]      Assessment/Recommendations   Assessment:   Coronary artery disease status post coronary artery bypass surgery December 2023 ([facility])   2.  Postoperative atrial fibrillation.  No prior history of A-fib CT surgery  3.  Hyperlipidemia  4.  Statin intolerance (patient has tried rosuvastatin pravastatin, atorvastatin).  All have resulted in severe myalgias  5.  Diabetes mellitus type 2, insulin-dependent, retinopathy, chronic kidney disease  6.  Chronic kidney disease stage IIIb  7.  Ischemic cardiomyopathy, ejection fraction 40 to 50%.      Plan:   1.  Reduce Bumex to 2 mg every other day  2.  Zio patch cardiac monitor to determine if patient has any other paroxysmal atrial fibs events.  If no events on cardiac monitor will discontinue warfarin therapy.  She has postoperative A-fib  3.  Continue carvedilol 6.25 milligrams daily  4.  Continue Zetia.  Repeat lipids in 2 months if LDL greater than 70 would recommend institution of a PCSK9 inhibitor.  Discussed patient  5.  Continue Plavix for 12 months.  Start aspirin 81 mg daily if warfarin discontinued  6.  Recommend repeat after next follow-up.  Reassess ischemic cardiomyopathy after revascularization       History of Present Illness/Subjective    HPI: Yoselyn Doyle is a 78 year old female recent history of non-ST elevated myocardial infarction in December 2023 resulting in need for coronary artery bypass surgery with occluded proximal right coronary artery and obtuse marginal branch with severe disease throughout the LAD.  She underwent successful coronary artery bypass surgery.  She did have post operative infection which has resolved.  She also had postoperative atrial fibrillation which is common.  She is currently on warfarin therapy with relatively stable INRs.  Discussed postoperative atrial fibrillation in detail with the patient.  No prior history of A-fib.  Plan will  be to obtain a cardiac monitor to determine if she has any evidence of paroxysmal atrial fibrillation.  If no evidence of A-fib will discontinue warfarin therapy given risk and benefits.    Coronary angiogram, echocardiogram was reviewed from [facility].  Discharge summary from CT surgery reviewed.  Recent labs reviewed.       Physical Examination  Review of Systems   Vitals: /60 (BP Location: Left arm, Patient Position: Sitting, Cuff Size: Adult Regular)   Pulse 67   Resp 20   Wt 65.9 kg (145 lb 3.2 oz)   LMP  (LMP Unknown)   SpO2 95%   BMI 27.44 kg/m    BMI= Body mass index is 27.44 kg/m .  Wt Readings from Last 3 Encounters:   04/09/24 65.9 kg (145 lb 3.2 oz)   02/23/24 67.1 kg (148 lb)   12/29/23 73.5 kg (162 lb)        Pleasant   ENT/Mouth: membranes moist, no oral lesions or bleeding gums.      EYES:  no scleral icterus, normal conjunctivae       Chest/Lungs:   lungs are clear to auscultation, no rales or wheezing, healed sternal scar, equal chest wall expansion    Cardiovascular:   Regular. Normal first and second heart sounds with no murmurs, rubs, or gallops; the carotid, radial and posterior tibial pulses are intact, Jugular venous pressure noraml, no edema bilaterally    Abdomen:  no tenderness; bowel sounds are present   Extremities: no cyanosis or clubbing   Skin: no xanthelasma, warm.    Neurologic: normal  bilateral, no tremors     Psychiatric: alert and oriented x3, calm        Please refer above for cardiac ROS details.        Medical History  Surgical History Family History Social History   Past Medical History:   Diagnosis Date    Coronary atherosclerosis of unspecified type of vessel, native or graft     Coronary artery disease    Infection due to 2019 novel coronavirus 08/29/2023    Open-angle glaucoma, unspecified(365.10)     Type II or unspecified type diabetes mellitus without mention of complication, not stated as uncontrolled     Diabetes mellitus     Past Surgical History:    Procedure Laterality Date    HC DILATION/CURETTAGE DIAG/THER NON OB  65,71    D & C    HC REMOVAL GALLBLADDER  1968    HC REMOVAL OF TONSILS,<12 Y/O      HC REMV CATARACT EXTRACAP,INSERT LENS, W/O ECP  12/20/06    right eye    HC REMV CATARACT EXTRACAP,INSERT LENS, W/O ECP  7/19/13    left eye    ZZC APPENDECTOMY  1960    ZZC GLAUCOMA SURG,TRABECU AB EXTERNO  03/28/06    right eye    ZZC TOTAL ABDOM HYSTERECTOMY  1975     Family History   Problem Relation Age of Onset    Diabetes Mother         diabetes        Social History     Socioeconomic History    Marital status:      Spouse name: Not on file    Number of children: Not on file    Years of education: Not on file    Highest education level: Not on file   Occupational History    Not on file   Tobacco Use    Smoking status: Former     Types: Cigarettes     Passive exposure: Never    Smokeless tobacco: Never    Tobacco comments:     12/00 aprox 30 pack years   Vaping Use    Vaping Use: Never used   Substance and Sexual Activity    Alcohol use: No    Drug use: No    Sexual activity: Yes     Comment: monogomous with  39 years   Other Topics Concern     Service Not Asked    Blood Transfusions No     Comment: hep c neg    Caffeine Concern Not Asked    Occupational Exposure Not Asked    Hobby Hazards Not Asked    Sleep Concern Not Asked    Stress Concern Not Asked    Weight Concern Not Asked    Special Diet Not Asked    Back Care Not Asked    Exercise Not Asked    Bike Helmet Not Asked    Seat Belt Not Asked    Self-Exams Not Asked   Social History Narrative    Not on file     Social Determinants of Health     Financial Resource Strain: Low Risk  (2/23/2024)    Financial Resource Strain     Within the past 12 months, have you or your family members you live with been unable to get utilities (heat, electricity) when it was really needed?: No   Food Insecurity: Low Risk  (2/23/2024)    Food Insecurity     Within the past 12 months, did you worry  that your food would run out before you got money to buy more?: No     Within the past 12 months, did the food you bought just not last and you didn t have money to get more?: No   Transportation Needs: Low Risk  (2/23/2024)    Transportation Needs     Within the past 12 months, has lack of transportation kept you from medical appointments, getting your medicines, non-medical meetings or appointments, work, or from getting things that you need?: No   Physical Activity: Not on file   Stress: No Stress Concern Present (2/23/2024)    [employer] [place] [employer] - Occupational Stress Questionnaire     Feeling of Stress : Only a little   Social Connections: Unknown (2/23/2024)    Social Connection and Isolation Panel [NHANES]     Frequency of Communication with Friends and Family: Not on file     Frequency of Social Gatherings with Friends and Family: Twice a week     Attends Anglican Services: Not on file     Active Member of Clubs or Organizations: Not on file     Attends Club or Organization Meetings: Not on file     Marital Status: Not on file   Interpersonal Safety: Low Risk  (2/23/2024)    Interpersonal Safety     Do you feel physically and emotionally safe where you currently live?: Yes     Within the past 12 months, have you been hit, slapped, kicked or otherwise physically hurt by someone?: No     Within the past 12 months, have you been humiliated or emotionally abused in other ways by your partner or ex-partner?: No   Housing Stability: Low Risk  (2/23/2024)    Housing Stability     Do you have housing? : Yes     Are you worried about losing your housing?: No           Medications  Allergies   Current Outpatient Medications   Medication Sig Dispense Refill    bumetanide (BUMEX) 2 MG tablet Take 1 tablet (2 mg) by mouth every other day      carvedilol (COREG) 6.25 MG tablet Take 1 tablet (6.25 mg) by mouth 2 times daily (with meals) 180 tablet 3    clopidogrel (PLAVIX) 75 MG tablet Take 1 tablet  (75 mg) by mouth every morning 90 tablet 3    ezetimibe (ZETIA) 10 MG tablet Take 1 tablet (10 mg) by mouth daily      guaiFENesin (ROBITUSSIN) 20 mg/mL liquid Take 100 mg by mouth      insulin glargine U-300 (TOUJEO MAX SOLOSTAR) 300 UNIT/ML (2 units dial) pen INJECT 36 UNITS SUBCUTANEOUSLY ONCE DAILY (Patient taking differently: 20 Units at bedtime INJECT 15-20 UNITS SUBCUTANEOUSLY ONCE DAILY) 12 mL 3    nitroGLYcerin (NITROSTAT) 0.4 MG sublingual tablet DISSOLVE ONE TABLET UNDER THE TONGUE EVERY 5 MINUTES AS NEEDED FOR CHEST PAIN.      NOVOLOG FLEXPEN 100 UNIT/ML soln INJECT 12 TO 15 UNITS SUBCUTANEOUSLY THREE TIMES DAILY BEFORE MEAL(S) (Patient taking differently: 15-20 Units 3 times daily (with meals) INJECT 12 TO 15 UNITS SUBCUTANEOUSLY THREE TIMES DAILY BEFORE MEAL(S)) 30 mL 3    omeprazole (PRILOSEC) 20 MG DR capsule Take 20 mg by mouth every morning      pantoprazole (PROTONIX) 40 MG EC tablet Take 1 tablet by mouth once daily 90 tablet 2    polyethylene glycol (MIRALAX) 17 GM/Dose powder Take 1 Capful by mouth daily      warfarin ANTICOAGULANT (COUMADIN) 2 MG tablet Take 1-1.5 tablets (2-3 mg) by mouth daily Adjust dose per INR as directed by  tablet 3       Allergies   Allergen Reactions    Rosiglitazone Maleate Shortness Of Breath     avandia- breathing trouble    Diphenhydramine Swelling    Irbesartan-Hydrochlorothiazide      Other reaction(s): Angioedema    Lisinopril      Other reaction(s): Angioedema    Macrolides And Ketolides      breathing trouble (zithromax)    Metformin Other (See Comments)    Moxifloxacin Other (See Comments)    Nsaids      naprosyn-hives    Nuts Itching    Penicillins      hives    Pioglitazone Hydrochloride Other (See Comments) and Swelling     actos- gi upset, cough    Prednisone      hives    Sulfa Antibiotics      hives          Lab Results    Chemistry/lipid CBC Cardiac Enzymes/BNP/TSH/INR   Recent Labs   Lab Test 03/05/24  1137 10/21/22  0809 08/07/21  0910   CHOL  "213*   < > 172   HDL 59   < > 60   *   < > 91   TRIG 152*   < > 118   CHOLHDLRATIO  --   --  2.9    < > = values in this interval not displayed.     Recent Labs   Lab Test 03/05/24  1137 10/21/22  0809 08/07/21  0910   * 85 91     Recent Labs   Lab Test 01/24/24  1333      POTASSIUM 4.1   CHLORIDE 97*   CO2 26   *   BUN 37.3*   CR 2.26*   GFRESTIMATED 21*   JACK 9.6     Recent Labs   Lab Test 01/24/24  1333 12/29/23  1144 06/15/23  0802   CR 2.26* 2.32* 1.63*     Recent Labs   Lab Test 03/05/24  1137 06/15/23  0802 10/21/22  0809   A1C 8.6* 7.9* 8.1*          Recent Labs   Lab Test 03/05/24  1137 01/24/24  1333   WBC  --  10.9   HGB 10.7* 10.5*   HCT  --  33.7*   MCV  --  90   PLT  --  283     Recent Labs   Lab Test 03/05/24  1137 01/24/24  1333 06/15/23  0802   HGB 10.7* 10.5* 12.0    No results for input(s): \"TROPONINI\" in the last 64643 hours.  No results for input(s): \"BNP\", \"NTBNPI\", \"NTBNP\" in the last 53277 hours.  No results for input(s): \"TSH\" in the last 65561 hours.  Recent Labs   Lab Test 04/02/24  1312 03/19/24  1254 03/05/24  1137   INR 2.0* 2.2* 2.5*        Lucio Espinoza, DO                                      "

## 2024-04-30 ENCOUNTER — ANTICOAGULATION THERAPY VISIT (OUTPATIENT)
Dept: ANTICOAGULATION | Facility: CLINIC | Age: 79
End: 2024-04-30

## 2024-04-30 ENCOUNTER — LAB (OUTPATIENT)
Dept: LAB | Facility: CLINIC | Age: 79
End: 2024-04-30
Payer: MEDICARE

## 2024-04-30 DIAGNOSIS — I48.0 PAROXYSMAL ATRIAL FIBRILLATION (H): ICD-10-CM

## 2024-04-30 DIAGNOSIS — E78.5 HYPERLIPEMIA: Primary | ICD-10-CM

## 2024-04-30 DIAGNOSIS — I48.0 PAROXYSMAL ATRIAL FIBRILLATION (H): Primary | ICD-10-CM

## 2024-04-30 LAB — INR BLD: 2 (ref 0.9–1.1)

## 2024-04-30 PROCEDURE — 80076 HEPATIC FUNCTION PANEL: CPT | Performed by: NURSE PRACTITIONER

## 2024-04-30 PROCEDURE — 36415 COLL VENOUS BLD VENIPUNCTURE: CPT

## 2024-04-30 PROCEDURE — 85610 PROTHROMBIN TIME: CPT | Mod: QW

## 2024-04-30 NOTE — PROGRESS NOTES
ANTICOAGULATION MANAGEMENT     Yoselyn Doyle 78 year old female is on warfarin with therapeutic INR result. (Goal INR 2.0-3.0)    Recent labs: (last 7 days)     04/30/24  1315   INR 2.0*       ASSESSMENT     Source(s): Chart Review and Patient/Caregiver Call     Warfarin doses taken: Warfarin taken as instructed  Diet: No new diet changes identified  Medication/supplement changes:  4/9 Bumex decreased  New illness, injury, or hospitalization: No  Signs or symptoms of bleeding or clotting: No  Previous result: Therapeutic last 2(+) visits  Additional findings:  She already wore the 14 day heart monitor and returned it on Saturday, waiting for results.  Chart says if heart monitor doesn't show any afib then warfarin can be stopped.  Told her to call the INR clinic if she is told she can stop the warfarin and chart will be updated.         PLAN     Recommended plan for no diet, medication or health factor changes affecting INR     Dosing Instructions: Continue your current warfarin dose with next INR in 4 weeks       Summary  As of 4/30/2024      Full warfarin instructions:  2 mg every Mon, Wed, Fri; 3 mg all other days   Next INR check:  5/28/2024               Telephone call with Yoselyn who verbalizes understanding and agrees to plan    Lab visit scheduled    Education provided:   Goal range and lab monitoring: goal range and significance of current result  Contact [phone] with any changes, questions or concerns.     Plan made per [facility] anticoagulation protocol    Gisele Gillis, RN  Anticoagulation Clinic  4/30/2024    _______________________________________________________________________     Anticoagulation Episode Summary       Current INR goal:  2.0-3.0   TTR:  80.6% (3.8 mo)   Target end date:  Indefinite   Send INR reminders to:  [facility] [place] [facility]    Indications    Paroxysmal atrial fibrillation (H) [I48.0]             Comments:               Anticoagulation Care Providers       Provider Role  Specialty Phone number    Chicho Luong MD Referring Internal Medicine [phone]

## 2024-05-01 LAB
ALBUMIN SERPL BCG-MCNC: 4.3 G/DL (ref 3.5–5.2)
ALP SERPL-CCNC: 68 U/L (ref 40–150)
ALT SERPL W P-5'-P-CCNC: 13 U/L (ref 0–50)
AST SERPL W P-5'-P-CCNC: 24 U/L (ref 0–45)
BILIRUB DIRECT SERPL-MCNC: <0.2 MG/DL (ref 0–0.3)
BILIRUB SERPL-MCNC: 0.2 MG/DL
PROT SERPL-MCNC: 7.7 G/DL (ref 6.4–8.3)

## 2024-05-16 ENCOUNTER — ANTICOAGULATION THERAPY VISIT (OUTPATIENT)
Dept: ANTICOAGULATION | Facility: CLINIC | Age: 79
End: 2024-05-16
Payer: MEDICARE

## 2024-05-16 DIAGNOSIS — I21.4 NSTEMI (NON-ST ELEVATED MYOCARDIAL INFARCTION) (H): ICD-10-CM

## 2024-05-16 DIAGNOSIS — I25.810 CORONARY ARTERY DISEASE INVOLVING AUTOLOGOUS ARTERY CORONARY BYPASS GRAFT WITHOUT ANGINA PECTORIS: Primary | ICD-10-CM

## 2024-05-16 DIAGNOSIS — I48.0 PAROXYSMAL ATRIAL FIBRILLATION (H): Primary | ICD-10-CM

## 2024-05-16 DIAGNOSIS — E11.3293 TYPE 2 DIABETES MELLITUS WITH MILD NONPROLIFERATIVE RETINOPATHY OF BOTH EYES, WITH LONG-TERM CURRENT USE OF INSULIN, MACULAR EDEMA PRESENCE UNSPECIFIED (H): ICD-10-CM

## 2024-05-16 DIAGNOSIS — I48.0 PAROXYSMAL ATRIAL FIBRILLATION (H): ICD-10-CM

## 2024-05-16 DIAGNOSIS — Z79.4 TYPE 2 DIABETES MELLITUS WITH MILD NONPROLIFERATIVE RETINOPATHY OF BOTH EYES, WITH LONG-TERM CURRENT USE OF INSULIN, MACULAR EDEMA PRESENCE UNSPECIFIED (H): ICD-10-CM

## 2024-05-16 RX ORDER — ASPIRIN 81 MG/1
162 TABLET ORAL DAILY
COMMUNITY
Start: 2024-05-16

## 2024-05-16 NOTE — PROGRESS NOTES
ANTICOAGULATION  MANAGEMENT    Yoselyn Doyle is being discharged from the [facility] ([facility]).    Reason for discharge: warfarin therapy completed    Lucio Espinoza,   5/13/2024  2:58 PM CDT       Please inform patient that there is no evidence of A-fib on her cardiac monitor.  No concerning arrhythmias.  No sustained ventricular or atrial arrhythmias.  Given that she has had no recurrence of A-fib since surgery could discontinue warfarin at this time for postoperative A-fib.  She should remain on aspirin at 162 mg daily     Paul Arrington RN  5/16/2024  3:25 PM CDT Back to Top      Dr. Luong & [facility] Team please review recommendations from cardiology. Please help Ms. Doyle to discharge from the anticoagulation team. Thank you DANIEL MICHAUD  _________________________________________________________________  Return incoming call from patient. Review of results and recommendations. Warfarin discontinued. Start Aspirin 162 mg daily. Aspirin added to med list. Follow-up with [facility] 7/9, reviewed. Plans to keep appointment. Will send message to Dr. Luong and F anticoagulation team to inform. Encouraged to cancel upcoming INR appointment. No further questions. Daniel MICHAUD     Anticoagulation episode resolved, [facility] referral closed, and Standing order discontinued    If patient needs warfarin management in the future, please send a new referral    Gisele Gillis RN

## 2024-05-17 ENCOUNTER — TELEPHONE (OUTPATIENT)
Dept: FAMILY MEDICINE | Facility: CLINIC | Age: 79
End: 2024-05-17
Payer: MEDICARE

## 2024-05-17 RX ORDER — INSULIN ASPART 100 [IU]/ML
15-20 INJECTION, SOLUTION INTRAVENOUS; SUBCUTANEOUS
Qty: 30 ML | Refills: 3 | Status: SHIPPED | OUTPATIENT
Start: 2024-05-17

## 2024-05-17 NOTE — TELEPHONE ENCOUNTER
[facility] calling to clarify on Novolog rx sent in 5/17. Two sets of directions:     Inject 15- 20  units 3 times daily OR inject 12-15 units 3 times daily.     Routing to provider to clarify directions.

## 2024-05-20 NOTE — TELEPHONE ENCOUNTER
Returned call to pharmacy and left message with below instructions. Requested call back if anything else is needed.

## 2024-05-21 ENCOUNTER — APPOINTMENT (OUTPATIENT)
Dept: AUDIOLOGY | Facility: CLINIC | Age: 79
End: 2024-05-21
Payer: MEDICARE

## 2024-05-21 DIAGNOSIS — Z53.9 ERRONEOUS ENCOUNTER--DISREGARD: Primary | ICD-10-CM

## 2024-07-05 ENCOUNTER — OFFICE VISIT (OUTPATIENT)
Dept: FAMILY MEDICINE | Facility: CLINIC | Age: 79
End: 2024-07-05
Payer: MEDICARE

## 2024-07-05 VITALS
HEART RATE: 66 BPM | WEIGHT: 145.8 LBS | TEMPERATURE: 97.5 F | RESPIRATION RATE: 18 BRPM | SYSTOLIC BLOOD PRESSURE: 149 MMHG | DIASTOLIC BLOOD PRESSURE: 55 MMHG | BODY MASS INDEX: 27.53 KG/M2 | HEIGHT: 61 IN | OXYGEN SATURATION: 100 %

## 2024-07-05 DIAGNOSIS — N18.32 STAGE 3B CHRONIC KIDNEY DISEASE (H): ICD-10-CM

## 2024-07-05 DIAGNOSIS — Z79.4 TYPE 2 DIABETES MELLITUS WITH BOTH EYES AFFECTED BY MILD NONPROLIFERATIVE RETINOPATHY WITHOUT MACULAR EDEMA, WITH LONG-TERM CURRENT USE OF INSULIN (H): ICD-10-CM

## 2024-07-05 DIAGNOSIS — I10 PRIMARY HYPERTENSION: ICD-10-CM

## 2024-07-05 DIAGNOSIS — Z00.00 HEALTH CARE MAINTENANCE: ICD-10-CM

## 2024-07-05 DIAGNOSIS — M85.89 OSTEOPENIA OF MULTIPLE SITES: ICD-10-CM

## 2024-07-05 DIAGNOSIS — E11.3293 TYPE 2 DIABETES MELLITUS WITH BOTH EYES AFFECTED BY MILD NONPROLIFERATIVE RETINOPATHY WITHOUT MACULAR EDEMA, WITH LONG-TERM CURRENT USE OF INSULIN (H): ICD-10-CM

## 2024-07-05 DIAGNOSIS — Z79.4 TYPE 2 DIABETES MELLITUS WITH MILD NONPROLIFERATIVE RETINOPATHY OF BOTH EYES, WITH LONG-TERM CURRENT USE OF INSULIN, MACULAR EDEMA PRESENCE UNSPECIFIED (H): ICD-10-CM

## 2024-07-05 DIAGNOSIS — E11.3299 NONPROLIFERATIVE DIABETIC RETINOPATHY (H): Primary | ICD-10-CM

## 2024-07-05 DIAGNOSIS — I25.10 ATHEROSCLEROSIS OF NATIVE CORONARY ARTERY OF NATIVE HEART WITHOUT ANGINA PECTORIS: ICD-10-CM

## 2024-07-05 DIAGNOSIS — I48.0 PAROXYSMAL ATRIAL FIBRILLATION (H): ICD-10-CM

## 2024-07-05 DIAGNOSIS — E11.3293 TYPE 2 DIABETES MELLITUS WITH MILD NONPROLIFERATIVE RETINOPATHY OF BOTH EYES, WITH LONG-TERM CURRENT USE OF INSULIN, MACULAR EDEMA PRESENCE UNSPECIFIED (H): ICD-10-CM

## 2024-07-05 PROBLEM — T81.49XA POSTOPERATIVE INFECTION OF WOUND OF STERNUM: Status: RESOLVED | Noted: 2024-01-04 | Resolved: 2024-07-05

## 2024-07-05 PROBLEM — U07.1 COVID-19: Status: RESOLVED | Noted: 2023-08-29 | Resolved: 2024-07-05

## 2024-07-05 PROBLEM — N18.30 ACUTE RENAL FAILURE SUPERIMPOSED ON STAGE 3 CHRONIC KIDNEY DISEASE (H): Status: RESOLVED | Noted: 2023-08-29 | Resolved: 2024-07-05

## 2024-07-05 PROBLEM — N17.9 ACUTE RENAL FAILURE SUPERIMPOSED ON STAGE 3 CHRONIC KIDNEY DISEASE (H): Status: RESOLVED | Noted: 2023-08-29 | Resolved: 2024-07-05

## 2024-07-05 PROBLEM — E87.1 HYPONATREMIA: Status: RESOLVED | Noted: 2023-08-29 | Resolved: 2024-07-05

## 2024-07-05 LAB
ERYTHROCYTE [DISTWIDTH] IN BLOOD BY AUTOMATED COUNT: 13.7 % (ref 10–15)
HCT VFR BLD AUTO: 31.7 % (ref 35–47)
HGB BLD-MCNC: 10.2 G/DL (ref 11.7–15.7)
MCH RBC QN AUTO: 27.6 PG (ref 26.5–33)
MCHC RBC AUTO-ENTMCNC: 32.2 G/DL (ref 31.5–36.5)
MCV RBC AUTO: 86 FL (ref 78–100)
PLATELET # BLD AUTO: 200 10E3/UL (ref 150–450)
RBC # BLD AUTO: 3.7 10E6/UL (ref 3.8–5.2)
WBC # BLD AUTO: 7.9 10E3/UL (ref 4–11)

## 2024-07-05 PROCEDURE — 36415 COLL VENOUS BLD VENIPUNCTURE: CPT | Performed by: INTERNAL MEDICINE

## 2024-07-05 PROCEDURE — 82570 ASSAY OF URINE CREATININE: CPT | Performed by: INTERNAL MEDICINE

## 2024-07-05 PROCEDURE — 99214 OFFICE O/P EST MOD 30 MIN: CPT | Performed by: INTERNAL MEDICINE

## 2024-07-05 PROCEDURE — 85027 COMPLETE CBC AUTOMATED: CPT | Performed by: INTERNAL MEDICINE

## 2024-07-05 PROCEDURE — 80048 BASIC METABOLIC PNL TOTAL CA: CPT | Performed by: INTERNAL MEDICINE

## 2024-07-05 PROCEDURE — 82043 UR ALBUMIN QUANTITATIVE: CPT | Performed by: INTERNAL MEDICINE

## 2024-07-05 RX ORDER — INSULIN GLARGINE 300 U/ML
20 INJECTION, SOLUTION SUBCUTANEOUS AT BEDTIME
Qty: 9 ML | Refills: 3 | Status: SHIPPED | OUTPATIENT
Start: 2024-07-05

## 2024-07-05 RX ORDER — RESPIRATORY SYNCYTIAL VIRUS VACCINE 120MCG/0.5
0.5 KIT INTRAMUSCULAR ONCE
Qty: 1 EACH | Refills: 0 | Status: CANCELLED | OUTPATIENT
Start: 2024-07-05 | End: 2024-07-05

## 2024-07-05 RX ORDER — INSULIN GLARGINE 300 U/ML
20 INJECTION, SOLUTION SUBCUTANEOUS AT BEDTIME
Qty: 9 ML | Refills: 3 | Status: SHIPPED | OUTPATIENT
Start: 2024-07-05 | End: 2024-07-05

## 2024-07-05 RX ORDER — CARVEDILOL 12.5 MG/1
12.5 TABLET ORAL 2 TIMES DAILY WITH MEALS
Qty: 180 TABLET | Refills: 3 | Status: SHIPPED | OUTPATIENT
Start: 2024-07-05

## 2024-07-05 NOTE — ASSESSMENT & PLAN NOTE
uncontrolled  current antihypertensive regimen: carvedilol 6.25mg BID, bumetanide 2mg QOD  regimen changes: increase carvedilol to 12.5mg BID, stopping bumetanide  Intolerance: ACEi/ARB - angioedema hx  future titration/work-up plan:   Prior to CABG, Previously maintained on atenolol/chlorthalidone 50/25mg, amlodipine 5mg,

## 2024-07-05 NOTE — ASSESSMENT & PLAN NOTE
Perioperative atrial fibrillation after bypass  -No further A-fib burden; okayed by cardiology to stop warfarin

## 2024-07-05 NOTE — ASSESSMENT & PLAN NOTE
controlled   hypoglycemic medications: intolerant to metformin due to GI symptoms  insulin therapy: Toujeo 20 units, Humalog 12-15 units TID + SSI   - ICR 1:3, sensitivity 10 (~20 units q meal)   - using CGMS/Freestyle Adry and seeing significant benefits from use  last HbA1c: 8.6%   microvascular complications: proliferative retinopathy - sees ophtho regularly, + microalbuminuria  macrovascular complications: CAD  ASA/GILDA/statin:  DAPT,  atorvastatin 40mg    - previous myalgias on higher dose of statin   - angioedema development on lisinopril and irbesartan    - avoidance of classes for now     Patient has met criteria for CGM coverage;   - patient has type 2 diabetes mellitus; and  - patient has been using a blood glucose monitor and performing four or more blood glucose test per day; and  - patient is insulin treated with multiple daily injections of three or more times per day; and  - within six months prior to ordering the CGM, the patient has had an in-person visit with the practitioner; and determined that criteria (1-4) above are met; and   - every six months following the initial prescription of the CGM, the treating practitioner has an in-person visit with the patient to assess adherence to their CGM regimen and diabetes treatment plan

## 2024-07-05 NOTE — PROGRESS NOTES
Assessment & Plan   Problem List Items Addressed This Visit          Endocrine    Type 2 diabetes mellitus with both eyes affected by mild nonproliferative retinopathy without macular edema, with long-term current use of insulin (H)     controlled   hypoglycemic medications: intolerant to metformin due to GI symptoms  insulin therapy: Toujeo 20 units, Humalog 12-15 units TID + SSI   - ICR 1:3, sensitivity 10 (~20 units q meal)   - using CGMS/Freestyle Adry and seeing significant benefits from use  last HbA1c: 8.6%   microvascular complications: proliferative retinopathy - sees ophtho regularly, + microalbuminuria  macrovascular complications: CAD  ASA/GILDA/statin:  DAPT,  atorvastatin 40mg    - previous myalgias on higher dose of statin   - angioedema development on lisinopril and irbesartan    - avoidance of classes for now     Patient has met criteria for CGM coverage;   - patient has type 2 diabetes mellitus; and  - patient has been using a blood glucose monitor and performing four or more blood glucose test per day; and  - patient is insulin treated with multiple daily injections of three or more times per day; and  - within six months prior to ordering the CGM, the patient has had an in-person visit with the practitioner; and determined that criteria (1-4) above are met; and   - every six months following the initial prescription of the CGM, the treating practitioner has an in-person visit with the patient to assess adherence to their CGM regimen and diabetes treatment plan          Relevant Medications    insulin glargine U-300 (TOUJEO MAX SOLOSTAR) 300 UNIT/ML (2 units dial) pen    Nonproliferative diabetic retinopathy (H) - Primary    Relevant Medications    insulin glargine U-300 (TOUJEO MAX SOLOSTAR) 300 UNIT/ML (2 units dial) pen       Circulatory     HTN  uncontrolled  current antihypertensive regimen: carvedilol 6.25mg BID, bumetanide 2mg QOD  regimen changes: increase carvedilol to 12.5mg BID,  stopping bumetanide  Intolerance: ACEi/ARB - angioedema hx  future titration/work-up plan:   Prior to CABG, Previously maintained on atenolol/chlorthalidone 50/25mg, amlodipine 5mg    Perioperative atrial fibrillation after bypass  -No further A-fib burden; okayed by cardiology to stop warfarin    CAD; 4VCABG  2/2023: NSTEMI presentation, admitted to [facility] -undergoing subsequent CABG   - Post off pump coronary artery bypass grafting x 4: LIMA to LAD, sequential saphenous vein graft to diagonal and OM, saphenous vein graft to PDA, endoscopic vein harvest of left lower extremity, left atrial appendage occlusion with 45 mm Atriclip, direct current cardioversion on 12/19/2023   - on carvedilol BID  - ASA + clopidogrel (recs for DAPT for 1 yr) + warfarin  - h/o statin intolerance (concerns with LFTs elevation as well)   - 2/2024: started on ezetimibe   - cardiology exploring PCSK9 inhibitor         Atherosclerosis of native coronary artery of native heart without angina pectoris     12/2023: NSTEMI presentation, admitted to [facility] -undergoing subsequent CABG   - Post off pump coronary artery bypass grafting x 4: LIMA to LAD, sequential saphenous vein graft to diagonal and OM, saphenous vein graft to PDA, endoscopic vein harvest of left lower extremity, left atrial appendage occlusion with 45 mm Atriclip, direct current cardioversion on 12/19/2023   - on carvedilol 6.25mg BID  - ASA + clopidogrel (recs for DAPT for 1 yr) + warfarin  - h/o statin intolerance (concerns with LFTs elevation as well)   - 2/2024: started on ezetimibe  - referring to [facility] cardiology for longer term cardiology cares           Primary hypertension     uncontrolled  current antihypertensive regimen: carvedilol 6.25mg BID, bumetanide 2mg QOD  regimen changes: increase carvedilol to 12.5mg BID, stopping bumetanide  Intolerance: ACEi/ARB - angioedema hx  future titration/work-up plan:   Prior to CABG, Previously maintained on  "atenolol/chlorthalidone 50/25mg, amlodipine 5mg,          Paroxysmal atrial fibrillation (H)     Perioperative atrial fibrillation after bypass  -No further A-fib burden; okayed by cardiology to stop warfarin         Relevant Medications    carvedilol (COREG) 12.5 MG tablet       Urinary    Stage 3b chronic kidney disease (H)     Baseline SCr 1.5-1.7  - post CABG rise in Scr to 2.8 (stabilized ~2-2.2)  - not maintained on ACEi/ARB (prior angioedema hx)         Relevant Orders    Albumin Random Urine Quantitative with Creat Ratio    Basic metabolic panel    CBC with platelets    RESOLVED: Acute renal failure superimposed on stage 3 chronic kidney disease (H)    Relevant Medications    carvedilol (COREG) 12.5 MG tablet    insulin glargine U-300 (TOUJEO MAX SOLOSTAR) 300 UNIT/ML (2 units dial) pen       Other    Health care maintenance      - declines any means of CRC screening   - DEXA '18: osteopenia   - repeat now   - prior DEE   - q2 yr mammography   - completed original COVID series - declines booster  - historically declines influenza   - declining health shared about           Other Visit Diagnoses       Type 2 diabetes mellitus with mild nonproliferative retinopathy of both eyes, with long-term current use of insulin, macular edema presence unspecified (H)        Relevant Medications    insulin glargine U-300 (TOUJEO MAX SOLOSTAR) 300 UNIT/ML (2 units dial) pen    Osteopenia of multiple sites        Relevant Orders    DX Bone Density                  BMI  Estimated body mass index is 27.55 kg/m  as calculated from the following:    Height as of this encounter: 1.549 m (5' 1\").    Weight as of this encounter: 66.1 kg (145 lb 12.8 oz).     Blood sugar testing frequency justification:  Uncontrolled diabetes, Adjustment of medication(s), and Risk of hypoglycemia with medication(s)  FUTURE APPOINTMENTS:       - Follow-up visit in 6 months      Renae Topete is a 78 year old, presenting for the following " "health issues: Returns for follow-up.  It has been an eventful year for healthwise with her bypass surgery, though feels like she has been settling back down into more usual routines.  Has been using bumetanide 2 mg every other day the questions whether she still needs to remain on this.  No issues with any further fluid retention issues.  Does check blood pressures at home typically sees systolic blood pressures in 130s to 140s.  Did complete 14-day cardiac monitoring with no further episodes of A-fib; was given okay by cardiology to stop warfarin and replace with aspirin in addition to her Plavix.  Using freestyle emily which has been helpful for her diabetic control.  Follow Up (Open heart surgery follow up.)        7/5/2024     4:13 PM   Additional Questions   Roomed by Nava     History of Present Illness       Diabetes:   She presents for follow up of diabetes.  She is checking home blood glucose four or more times daily.   She checks blood glucose after meals.  Blood glucose is sometimes over 200 and sometimes under 70. She is aware of hypoglycemia symptoms including shakiness.   She is concerned about low blood sugar, several less than 70 in the past few weeks.   She is having numbness in feet and burning in feet.  The patient has not had a diabetic eye exam in the last 12 months.          She eats 2-3 servings of fruits and vegetables daily.She consumes 2 sweetened beverage(s) daily.She exercises with enough effort to increase her heart rate 10 to 19 minutes per day.  She exercises with enough effort to increase her heart rate 5 days per week.   She is taking medications regularly.         Review of Systems  Constitutional, HEENT, cardiovascular, pulmonary, gi and gu systems are negative, except as otherwise noted.      Objective    BP (!) 149/55 (BP Location: Left arm, Patient Position: Sitting, Cuff Size: Adult Large)   Pulse 66   Temp 97.5  F (36.4  C) (Tympanic)   Resp 18   Ht 1.549 m (5' 1\")   " Wt 66.1 kg (145 lb 12.8 oz)   LMP  (LMP Unknown)   SpO2 100%   BMI 27.55 kg/m    Body mass index is 27.55 kg/m .  Physical Exam   GENERAL: alert and no distress  NECK: no adenopathy, no asymmetry, masses, or scars  RESP: lungs clear to auscultation - no rales, rhonchi or wheezes  CV: regular rate and rhythm, normal S1 S2, no S3 or S4, no murmur, click or rub, no peripheral edema  ABDOMEN: soft, nontender, no hepatosplenomegaly, no masses and bowel sounds normal  MS: no gross musculoskeletal defects noted, no edema    Orders Only on 04/30/2024   Component Date Value Ref Range Status    Protein Total 04/30/2024 7.7  6.4 - 8.3 g/dL Final    Albumin 04/30/2024 4.3  3.5 - 5.2 g/dL Final    Bilirubin Total 04/30/2024 0.2  <=1.2 mg/dL Final    Alkaline Phosphatase 04/30/2024 68  40 - 150 U/L Final    Reference intervals for this test were updated on 11/14/2023 to more accurately reflect our healthy population. There may be differences in the flagging of prior results with similar values performed with this method. Interpretation of those prior results can be made in the context of the updated reference intervals.    AST 04/30/2024 24  0 - 45 U/L Final    Reference intervals for this test were updated on 6/12/2023 to more accurately reflect our healthy population. There may be differences in the flagging of prior results with similar values performed with this method. Interpretation of those prior results can be made in the context of the updated reference intervals.    ALT 04/30/2024 13  0 - 50 U/L Final    Reference intervals for this test were updated on 6/12/2023 to more accurately reflect our healthy population. There may be differences in the flagging of prior results with similar values performed with this method. Interpretation of those prior results can be made in the context of the updated reference intervals.      Bilirubin Direct 04/30/2024 <0.20  0.00 - 0.30 mg/dL Final           Signed Electronically by:  Chicho Luong MD

## 2024-07-05 NOTE — ASSESSMENT & PLAN NOTE
HTN  uncontrolled  current antihypertensive regimen: carvedilol 6.25mg BID, bumetanide 2mg QOD  regimen changes: increase carvedilol to 12.5mg BID, stopping bumetanide  Intolerance: ACEi/ARB - angioedema hx  future titration/work-up plan:   Prior to CABG, Previously maintained on atenolol/chlorthalidone 50/25mg, amlodipine 5mg    Perioperative atrial fibrillation after bypass  -No further A-fib burden; okayed by cardiology to stop warfarin    CAD; 4VCABG  2/2023: NSTEMI presentation, admitted to [facility] -undergoing subsequent CABG   - Post off pump coronary artery bypass grafting x 4: LIMA to LAD, sequential saphenous vein graft to diagonal and OM, saphenous vein graft to PDA, endoscopic vein harvest of left lower extremity, left atrial appendage occlusion with 45 mm Atriclip, direct current cardioversion on 12/19/2023   - on carvedilol BID  - ASA + clopidogrel (recs for DAPT for 1 yr) + warfarin  - h/o statin intolerance (concerns with LFTs elevation as well)   - 2/2024: started on ezetimibe   - cardiology exploring PCSK9 inhibitor

## 2024-07-05 NOTE — ASSESSMENT & PLAN NOTE
- declines any means of CRC screening   - ROSARIO '18: osteopenia   - repeat now   - prior DEE   - q2 yr mammography   - completed original COVID series - declines booster  - historically declines influenza   - declining health shared about

## 2024-07-05 NOTE — ASSESSMENT & PLAN NOTE
12/2023: NSTEMI presentation, admitted to [facility] -undergoing subsequent CABG   - Post off pump coronary artery bypass grafting x 4: LIMA to LAD, sequential saphenous vein graft to diagonal and OM, saphenous vein graft to PDA, endoscopic vein harvest of left lower extremity, left atrial appendage occlusion with 45 mm Atriclip, direct current cardioversion on 12/19/2023   - on carvedilol 6.25mg BID  - ASA + clopidogrel (recs for DAPT for 1 yr) + warfarin  - h/o statin intolerance (concerns with LFTs elevation as well)   - 2/2024: started on ezetimibe  - referring to [facility] cardiology for longer term cardiology cares

## 2024-07-06 LAB
ANION GAP SERPL CALCULATED.3IONS-SCNC: 13 MMOL/L (ref 7–15)
BUN SERPL-MCNC: 31 MG/DL (ref 8–23)
CALCIUM SERPL-MCNC: 9.2 MG/DL (ref 8.8–10.2)
CHLORIDE SERPL-SCNC: 106 MMOL/L (ref 98–107)
CREAT SERPL-MCNC: 2.19 MG/DL (ref 0.51–0.95)
CREAT UR-MCNC: 172 MG/DL
DEPRECATED HCO3 PLAS-SCNC: 20 MMOL/L (ref 22–29)
EGFRCR SERPLBLD CKD-EPI 2021: 22 ML/MIN/1.73M2
GLUCOSE SERPL-MCNC: 302 MG/DL (ref 70–99)
MICROALBUMIN UR-MCNC: 24.6 MG/L
MICROALBUMIN/CREAT UR: 14.3 MG/G CR (ref 0–25)
POTASSIUM SERPL-SCNC: 4.2 MMOL/L (ref 3.4–5.3)
SODIUM SERPL-SCNC: 139 MMOL/L (ref 135–145)

## 2024-07-09 ENCOUNTER — OFFICE VISIT (OUTPATIENT)
Dept: CARDIOLOGY | Facility: CLINIC | Age: 79
End: 2024-07-09
Attending: INTERNAL MEDICINE
Payer: MEDICARE

## 2024-07-09 VITALS
HEART RATE: 68 BPM | WEIGHT: 144.3 LBS | DIASTOLIC BLOOD PRESSURE: 62 MMHG | HEIGHT: 62 IN | BODY MASS INDEX: 26.55 KG/M2 | RESPIRATION RATE: 16 BRPM | SYSTOLIC BLOOD PRESSURE: 150 MMHG

## 2024-07-09 DIAGNOSIS — Z78.9 STATIN INTOLERANCE: ICD-10-CM

## 2024-07-09 DIAGNOSIS — I10 PRIMARY HYPERTENSION: ICD-10-CM

## 2024-07-09 DIAGNOSIS — E78.2 MIXED HYPERLIPIDEMIA: ICD-10-CM

## 2024-07-09 DIAGNOSIS — I25.10 CORONARY ARTERY DISEASE INVOLVING NATIVE CORONARY ARTERY OF NATIVE HEART WITHOUT ANGINA PECTORIS: Primary | ICD-10-CM

## 2024-07-09 DIAGNOSIS — E78.5 HYPERLIPIDEMIA LDL GOAL <70: ICD-10-CM

## 2024-07-09 PROCEDURE — 99214 OFFICE O/P EST MOD 30 MIN: CPT | Performed by: INTERNAL MEDICINE

## 2024-07-09 PROCEDURE — G2211 COMPLEX E/M VISIT ADD ON: HCPCS | Performed by: INTERNAL MEDICINE

## 2024-07-09 NOTE — PROGRESS NOTES
HEART CARE ENCOUNTER CONSULTATON NOTE      [facility]  [phone]      Assessment/Recommendations   Assessment:   Coronary artery disease status post coronary artery bypass surgery December 2023 ([facility])   2.  Postoperative atrial fibrillation.  No prior history of A-fib CT surgery.  Off anticoagulation.    3.  Hyperlipidemia  4.  Statin intolerance (patient has tried rosuvastatin pravastatin, atorvastatin).  All have resulted in severe myalgias  5.  Diabetes mellitus type 2, insulin-dependent, retinopathy, chronic kidney disease  6.  Chronic kidney disease stage IIIb  7.  Ischemic cardiomyopathy, ejection fraction 40 to 50%.      Plan:   1.  Continue carvedilol 12.5 milligrams daily  2.  Start amlodipine 5 mg daily for HTN if BP remain elevated.    3.  Continue Zetia.  Work on reauthorization for Repatha 140 mg Q14 daily subcutaneous or Praluent 75 mg BID SQ. given patient has statin intolerance and LDL greater than despite Zetia  Recent Labs   Lab Test 03/05/24  1137 10/21/22  0809 08/07/21  0910 07/16/20  1005   CHOL 213* 170 172 161   HDL 59 65 60 64   * 85 91 80   TRIG 152* 101 118 83   CHOLHDLRATIO  --   --  2.9 2.5     4.  Continue Plavix for 12 months (Stop Jan 1st 2025).  Aspirin 81 mg daily   5.  Repeat echocardiogram to evaluate ischemic cardiomyopathy after revascularization     History of Present Illness/Subjective    HPI: Yoselyn Doyle is a 78 year old female coronary disease status post coronary bypass surgery, NSTEMI in December 2023 prior to revascularization, ischemic cardiomyopathy with mild reduction left ventricular ejection fraction, chronic kidney disease stage IV who presents to cardiology clinic in follow-up.    Patient was recently evaluated by her primary care doctor, Dr. Luong who discontinued Bumex and adjusted carvedilol.  Blood pressure today remains elevated which is only had a couple days of adjustments in blood pressure.  She will start taking her home  "blood pressure readings.  Reviewed lipids in detail which remain elevated.  She is on Zetia but resulting in elevated LDL evels.  Goal LDL is less than 70.  Will work towards preauthorization of Repatha or Praluent.      Currently she denies any dyspnea on exertion.  She remains active in her new [facility].  She was mowing over the week at her son's home with no difficulty.  She denies any orthopnea or PND symptoms.  No lower extremity edema with stopping Bumex recently.    ECHO: 12/22/2023  Final Conclusion Previous Study: 12/16/2023    Visually Estimated EF: 45-50%    Technically difficult study - contrast was used to enhance endocardial definition due to   suboptimal image quality.    Mildly abnormal left ventricular ejection fraction estimated at 45-50%.    Inferior and inferior septal wall motion abnormality.      Physical Examination  Review of Systems   Vitals: BP (!) 150/62   Pulse 68   Resp 16   Ht 1.575 m (5' 2\")   Wt 65.5 kg (144 lb 4.8 oz)   LMP  (LMP Unknown)   BMI 26.39 kg/m    BMI= Body mass index is 26.39 kg/m .  Wt Readings from Last 3 Encounters:   07/09/24 65.5 kg (144 lb 4.8 oz)   07/05/24 66.1 kg (145 lb 12.8 oz)   04/09/24 65.9 kg (145 lb 3.2 oz)        Pleasant   ENT/Mouth: membranes moist, no oral lesions or bleeding gums.      EYES:  no scleral icterus, normal conjunctivae       Chest/Lungs:   lungs are clear to auscultation, no rales or wheezing, healed sternal scar, equal chest wall expansion    Cardiovascular:   Regular. Normal first and second heart sounds with no murmurs, rubs, or gallops; the carotid, radial and posterior tibial pulses are intact, Jugular venous pressure noraml, no edema bilaterally    Abdomen:  no tenderness; bowel sounds are present   Extremities: no cyanosis or clubbing   Skin: no xanthelasma, warm.    Neurologic: normal  bilateral, no tremors     Psychiatric: alert and oriented x3, calm        Please refer above for cardiac ROS details.        Medical " History  Surgical History Family History Social History   Past Medical History:   Diagnosis Date    Coronary atherosclerosis of unspecified type of vessel, native or graft     Coronary artery disease    Infection due to 2019 novel coronavirus 08/29/2023    Open-angle glaucoma, unspecified(365.10)     Type II or unspecified type diabetes mellitus without mention of complication, not stated as uncontrolled     Diabetes mellitus     Past Surgical History:   Procedure Laterality Date    HC DILATION/CURETTAGE DIAG/THER NON OB  65,71    D & C    HC REMOVAL GALLBLADDER  1968    HC REMOVAL OF TONSILS,<10 Y/O      HC REMV CATARACT EXTRACAP,INSERT LENS, W/O ECP  12/20/06    right eye    HC REMV CATARACT EXTRACAP,INSERT LENS, W/O ECP  7/19/13    left eye    ZZC APPENDECTOMY  1960    ZZC GLAUCOMA SURG,TRABECU AB EXTERNO  03/28/06    right eye    ZZC TOTAL ABDOM HYSTERECTOMY  1975     Family History   Problem Relation Age of Onset    Diabetes Mother         diabetes        Social History     Socioeconomic History    Marital status:      Spouse name: Not on file    Number of children: Not on file    Years of education: Not on file    Highest education level: Not on file   Occupational History    Not on file   Tobacco Use    Smoking status: Former     Types: Cigarettes     Passive exposure: Never    Smokeless tobacco: Never    Tobacco comments:     12/00 aprox 30 pack years   Vaping Use    Vaping status: Never Used   Substance and Sexual Activity    Alcohol use: No    Drug use: No    Sexual activity: Yes     Comment: monogomous with  39 years   Other Topics Concern     Service Not Asked    Blood Transfusions No     Comment: hep c neg    Caffeine Concern Not Asked    Occupational Exposure Not Asked    Hobby Hazards Not Asked    Sleep Concern Not Asked    Stress Concern Not Asked    Weight Concern Not Asked    Special Diet Not Asked    Back Care Not Asked    Exercise Not Asked    Bike Helmet Not Asked    Seat  Belt Not Asked    Self-Exams Not Asked   Social History Narrative    Not on file     Social Determinants of Health     Financial Resource Strain: Low Risk  (2/23/2024)    Financial Resource Strain     Within the past 12 months, have you or your family members you live with been unable to get utilities (heat, electricity) when it was really needed?: No   Food Insecurity: Low Risk  (2/23/2024)    Food Insecurity     Within the past 12 months, did you worry that your food would run out before you got money to buy more?: No     Within the past 12 months, did the food you bought just not last and you didn t have money to get more?: No   Transportation Needs: Low Risk  (2/23/2024)    Transportation Needs     Within the past 12 months, has lack of transportation kept you from medical appointments, getting your medicines, non-medical meetings or appointments, work, or from getting things that you need?: No   Physical Activity: Not on file   Stress: No Stress Concern Present (2/23/2024)    [employer] [place] [employer] - Occupational Stress Questionnaire     Feeling of Stress : Only a little   Social Connections: Unknown (2/23/2024)    Social Connection and Isolation Panel [NHANES]     Frequency of Communication with Friends and Family: Not on file     Frequency of Social Gatherings with Friends and Family: Twice a week     Attends [employer] Services: Not on file     Active Member of Clubs or Organizations: Not on file     Attends Club or Organization Meetings: Not on file     Marital Status: Not on file   Interpersonal Safety: Low Risk  (2/23/2024)    Interpersonal Safety     Do you feel physically and emotionally safe where you currently live?: Yes     Within the past 12 months, have you been hit, slapped, kicked or otherwise physically hurt by someone?: No     Within the past 12 months, have you been humiliated or emotionally abused in other ways by your partner or ex-partner?: No   Housing Stability: Low Risk   (2/23/2024)    Housing Stability     Do you have housing? : Yes     Are you worried about losing your housing?: No           Medications  Allergies   Current Outpatient Medications   Medication Sig Dispense Refill    aspirin 81 MG EC tablet Take 2 tablets (162 mg) by mouth daily      carvedilol (COREG) 12.5 MG tablet Take 1 tablet (12.5 mg) by mouth 2 times daily (with meals) 180 tablet 3    clopidogrel (PLAVIX) 75 MG tablet Take 1 tablet (75 mg) by mouth every morning 90 tablet 3    ezetimibe (ZETIA) 10 MG tablet Take 1 tablet (10 mg) by mouth daily      insulin glargine U-300 (TOUJEO MAX SOLOSTAR) 300 UNIT/ML (2 units dial) pen Inject 20 Units Subcutaneous at bedtime INJECT 15-20 UNITS SUBCUTANEOUSLY ONCE DAILY 9 mL 3    nitroGLYcerin (NITROSTAT) 0.4 MG sublingual tablet DISSOLVE ONE TABLET UNDER THE TONGUE EVERY 5 MINUTES AS NEEDED FOR CHEST PAIN.      NOVOLOG FLEXPEN 100 UNIT/ML soln Inject 15-20 Units Subcutaneous 3 times daily (with meals) INJECT 12 TO 15 UNITS SUBCUTANEOUSLY THREE TIMES DAILY BEFORE MEAL(S) 30 mL 3    pantoprazole (PROTONIX) 40 MG EC tablet Take 1 tablet by mouth once daily 90 tablet 2    polyethylene glycol (MIRALAX) 17 GM/Dose powder Take 1 Capful by mouth daily         Allergies   Allergen Reactions    Rosiglitazone Maleate Shortness Of Breath     avandia- breathing trouble    Diphenhydramine Swelling    Irbesartan-Hydrochlorothiazide      Other reaction(s): Angioedema    Lisinopril      Other reaction(s): Angioedema    Macrolides And Ketolides      breathing trouble (zithromax)    Metformin Other (See Comments)    Moxifloxacin Other (See Comments)    Nsaids      naprosyn-hives    Nuts Itching    Penicillins      hives    Pioglitazone Hydrochloride Other (See Comments) and Swelling     actos- gi upset, cough    Prednisone      hives    Sulfa Antibiotics      hives          Lab Results    Chemistry/lipid CBC Cardiac Enzymes/BNP/TSH/INR   Recent Labs   Lab Test 03/05/24  1137 10/21/22  0809  "08/07/21  0910   CHOL 213*   < > 172   HDL 59   < > 60   *   < > 91   TRIG 152*   < > 118   CHOLHDLRATIO  --   --  2.9    < > = values in this interval not displayed.     Recent Labs   Lab Test 03/05/24  1137 10/21/22  0809 08/07/21  0910   * 85 91     Recent Labs   Lab Test 01/24/24  1333      POTASSIUM 4.1   CHLORIDE 97*   CO2 26   *   BUN 37.3*   CR 2.26*   GFRESTIMATED 21*   JACK 9.6     Recent Labs   Lab Test 01/24/24  1333 12/29/23  1144 06/15/23  0802   CR 2.26* 2.32* 1.63*     Recent Labs   Lab Test 03/05/24  1137 06/15/23  0802 10/21/22  0809   A1C 8.6* 7.9* 8.1*          Recent Labs   Lab Test 03/05/24  1137 01/24/24  1333   WBC  --  10.9   HGB 10.7* 10.5*   HCT  --  33.7*   MCV  --  90   PLT  --  283     Recent Labs   Lab Test 03/05/24  1137 01/24/24  1333 06/15/23  0802   HGB 10.7* 10.5* 12.0    No results for input(s): \"TROPONINI\" in the last 48133 hours.  No results for input(s): \"BNP\", \"NTBNPI\", \"NTBNP\" in the last 16858 hours.  No results for input(s): \"TSH\" in the last 92063 hours.  Recent Labs   Lab Test 04/02/24  1312 03/19/24  1254 03/05/24  1137   INR 2.0* 2.2* 2.5*        Lucio Espinoza DO      Today's clinic visit entailed:  38 minutes spent by me on the date of the encounter doing chart review, history and exam, documentation and further activities per the note.   The longitudinal plan of care for the diagnosis(es)/condition(s) as documented were addressed during this visit. Due to the added complexity in care, I will continue to support Yoselyn in the subsequent management and with ongoing continuity of care.                                    "

## 2024-07-09 NOTE — PATIENT INSTRUCTIONS
Please contact direct nurses line Monday through Friday 8 AM to 5 PM @ [phone]    After-hours contact cardiology office at [phone].    Plan:   Check blood pressure at home.  Will need to consider restarting amlodipine 5 mg daily.   Will consider starting Repatha and Praluent injection for cholesterol  Repeat echo (heart ultrasound) to dot at heart function after surgery (Yuniel).

## 2024-07-09 NOTE — LETTER
7/9/2024    Chicho Luong MD  [address] [facility] [address]    RE: Yoselyn Doyle       Dear Colleague,     I had the pleasure of seeing Yoselyn Doyle in the [facility].    HEART CARE ENCOUNTER CONSULTATON NOTE      [facility]  [phone]      Assessment/Recommendations   Assessment:   Coronary artery disease status post coronary artery bypass surgery December 2023 ([facility])   2.  Postoperative atrial fibrillation.  No prior history of A-fib CT surgery.  Off anticoagulation.    3.  Hyperlipidemia  4.  Statin intolerance (patient has tried rosuvastatin pravastatin, atorvastatin).  All have resulted in severe myalgias  5.  Diabetes mellitus type 2, insulin-dependent, retinopathy, chronic kidney disease  6.  Chronic kidney disease stage IIIb  7.  Ischemic cardiomyopathy, ejection fraction 40 to 50%.      Plan:   1.  Continue carvedilol 12.5 milligrams daily  2.  Start amlodipine 5 mg daily for HTN if BP remain elevated.    3.  Continue Zetia.  Work on reauthorization for Repatha 140 mg Q14 daily subcutaneous or Praluent 75 mg BID SQ. given patient has statin intolerance and LDL greater than despite Zetia  Recent Labs   Lab Test 03/05/24  1137 10/21/22  0809 08/07/21  0910 07/16/20  1005   CHOL 213* 170 172 161   HDL 59 65 60 64   * 85 91 80   TRIG 152* 101 118 83   CHOLHDLRATIO  --   --  2.9 2.5     4.  Continue Plavix for 12 months (Stop Jan 1st 2025).  Aspirin 81 mg daily   5.  Repeat echocardiogram to evaluate ischemic cardiomyopathy after revascularization     History of Present Illness/Subjective    HPI: Yoselyn Doyle is a 78 year old female coronary disease status post coronary bypass surgery, NSTEMI in December 2023 prior to revascularization, ischemic cardiomyopathy with mild reduction left ventricular ejection fraction, chronic kidney disease stage IV who presents to cardiology clinic in follow-up.    Patient was recently evaluated by her primary care doctor,  "Dr. Luong who discontinued Bumex and adjusted carvedilol.  Blood pressure today remains elevated which is only had a couple days of adjustments in blood pressure.  She will start taking her home blood pressure readings.  Reviewed lipids in detail which remain elevated.  She is on Zetia but resulting in elevated LDL evels.  Goal LDL is less than 70.  Will work towards preauthorization of Repatha or Praluent.      Currently she denies any dyspnea on exertion.  She remains active in her [facility].  She was mowing over the week at her son's home with no difficulty.  She denies any orthopnea or PND symptoms.  No lower extremity edema with stopping Bumex recently.    ECHO: 12/22/2023  Final Conclusion Previous Study: 12/16/2023    Visually Estimated EF: 45-50%    Technically difficult study - contrast was used to enhance endocardial definition due to   suboptimal image quality.    Mildly abnormal left ventricular ejection fraction estimated at 45-50%.    Inferior and inferior septal wall motion abnormality.      Physical Examination  Review of Systems   Vitals: BP (!) 150/62   Pulse 68   Resp 16   Ht 1.575 m (5' 2\")   Wt 65.5 kg (144 lb 4.8 oz)   LMP  (LMP Unknown)   BMI 26.39 kg/m    BMI= Body mass index is 26.39 kg/m .  Wt Readings from Last 3 Encounters:   07/09/24 65.5 kg (144 lb 4.8 oz)   07/05/24 66.1 kg (145 lb 12.8 oz)   04/09/24 65.9 kg (145 lb 3.2 oz)        Pleasant   ENT/Mouth: membranes moist, no oral lesions or bleeding gums.      EYES:  no scleral icterus, normal conjunctivae       Chest/Lungs:   lungs are clear to auscultation, no rales or wheezing, healed sternal scar, equal chest wall expansion    Cardiovascular:   Regular. Normal first and second heart sounds with no murmurs, rubs, or gallops; the carotid, radial and posterior tibial pulses are intact, Jugular venous pressure noraml, no edema bilaterally    Abdomen:  no tenderness; bowel sounds are present   Extremities: no cyanosis or " clubbing   Skin: no xanthelasma, warm.    Neurologic: normal  bilateral, no tremors     Psychiatric: alert and oriented x3, calm        Please refer above for cardiac ROS details.        Medical History  Surgical History Family History Social History   Past Medical History:   Diagnosis Date    Coronary atherosclerosis of unspecified type of vessel, native or graft     Coronary artery disease    Infection due to 2019 novel coronavirus 08/29/2023    Open-angle glaucoma, unspecified(365.10)     Type II or unspecified type diabetes mellitus without mention of complication, not stated as uncontrolled     Diabetes mellitus     Past Surgical History:   Procedure Laterality Date    HC DILATION/CURETTAGE DIAG/THER NON OB  65,71    D & C    HC REMOVAL GALLBLADDER  1968    HC REMOVAL OF TONSILS,<10 Y/O      HC REMV CATARACT EXTRACAP,INSERT LENS, W/O ECP  12/20/06    right eye    HC REMV CATARACT EXTRACAP,INSERT LENS, W/O ECP  7/19/13    left eye    ZZC APPENDECTOMY  1960    ZZC GLAUCOMA SURG,TRABECU AB EXTERNO  03/28/06    right eye    ZZC TOTAL ABDOM HYSTERECTOMY  1975     Family History   Problem Relation Age of Onset    Diabetes Mother         diabetes        Social History     Socioeconomic History    Marital status:      Spouse name: Not on file    Number of children: Not on file    Years of education: Not on file    Highest education level: Not on file   Occupational History    Not on file   Tobacco Use    Smoking status: Former     Types: Cigarettes     Passive exposure: Never    Smokeless tobacco: Never    Tobacco comments:     12/00 aprox 30 pack years   Vaping Use    Vaping status: Never Used   Substance and Sexual Activity    Alcohol use: No    Drug use: No    Sexual activity: Yes     Comment: monogomous with  39 years   Other Topics Concern     Service Not Asked    Blood Transfusions No     Comment: hep c neg    Caffeine Concern Not Asked    Occupational Exposure Not Asked    Hobby Hazards  Not Asked    Sleep Concern Not Asked    Stress Concern Not Asked    Weight Concern Not Asked    Special Diet Not Asked    Back Care Not Asked    Exercise Not Asked    Bike Helmet Not Asked    Seat Belt Not Asked    Self-Exams Not Asked   Social History Narrative    Not on file     Social Determinants of Health     Financial Resource Strain: Low Risk  (2/23/2024)    Financial Resource Strain     Within the past 12 months, have you or your family members you live with been unable to get utilities (heat, electricity) when it was really needed?: No   Food Insecurity: Low Risk  (2/23/2024)    Food Insecurity     Within the past 12 months, did you worry that your food would run out before you got money to buy more?: No     Within the past 12 months, did the food you bought just not last and you didn t have money to get more?: No   Transportation Needs: Low Risk  (2/23/2024)    Transportation Needs     Within the past 12 months, has lack of transportation kept you from medical appointments, getting your medicines, non-medical meetings or appointments, work, or from getting things that you need?: No   Physical Activity: Not on file   Stress: No Stress Concern Present (2/23/2024)    [employer] - Occupational Stress Questionnaire     Feeling of Stress : Only a little   Social Connections: Unknown (2/23/2024)    Social Connection and Isolation Panel [NHANES]     Frequency of Communication with Friends and Family: Not on file     Frequency of Social Gatherings with Friends and Family: Twice a week     Attends Druze Services: Not on file     Active Member of Clubs or Organizations: Not on file     Attends Club or Organization Meetings: Not on file     Marital Status: Not on file   Interpersonal Safety: Low Risk  (2/23/2024)    Interpersonal Safety     Do you feel physically and emotionally safe where you currently live?: Yes     Within the past 12 months, have you been hit, slapped, kicked or  otherwise physically hurt by someone?: No     Within the past 12 months, have you been humiliated or emotionally abused in other ways by your partner or ex-partner?: No   Housing Stability: Low Risk  (2/23/2024)    Housing Stability     Do you have housing? : Yes     Are you worried about losing your housing?: No           Medications  Allergies   Current Outpatient Medications   Medication Sig Dispense Refill    aspirin 81 MG EC tablet Take 2 tablets (162 mg) by mouth daily      carvedilol (COREG) 12.5 MG tablet Take 1 tablet (12.5 mg) by mouth 2 times daily (with meals) 180 tablet 3    clopidogrel (PLAVIX) 75 MG tablet Take 1 tablet (75 mg) by mouth every morning 90 tablet 3    ezetimibe (ZETIA) 10 MG tablet Take 1 tablet (10 mg) by mouth daily      insulin glargine U-300 (TOUJEO MAX SOLOSTAR) 300 UNIT/ML (2 units dial) pen Inject 20 Units Subcutaneous at bedtime INJECT 15-20 UNITS SUBCUTANEOUSLY ONCE DAILY 9 mL 3    nitroGLYcerin (NITROSTAT) 0.4 MG sublingual tablet DISSOLVE ONE TABLET UNDER THE TONGUE EVERY 5 MINUTES AS NEEDED FOR CHEST PAIN.      NOVOLOG FLEXPEN 100 UNIT/ML soln Inject 15-20 Units Subcutaneous 3 times daily (with meals) INJECT 12 TO 15 UNITS SUBCUTANEOUSLY THREE TIMES DAILY BEFORE MEAL(S) 30 mL 3    pantoprazole (PROTONIX) 40 MG EC tablet Take 1 tablet by mouth once daily 90 tablet 2    polyethylene glycol (MIRALAX) 17 GM/Dose powder Take 1 Capful by mouth daily         Allergies   Allergen Reactions    Rosiglitazone Maleate Shortness Of Breath     avandia- breathing trouble    Diphenhydramine Swelling    Irbesartan-Hydrochlorothiazide      Other reaction(s): Angioedema    Lisinopril      Other reaction(s): Angioedema    Macrolides And Ketolides      breathing trouble (zithromax)    Metformin Other (See Comments)    Moxifloxacin Other (See Comments)    Nsaids      naprosyn-hives    Nuts Itching    Penicillins      hives    Pioglitazone Hydrochloride Other (See Comments) and Swelling     actos-  "gi upset, cough    Prednisone      hives    Sulfa Antibiotics      hives          Lab Results    Chemistry/lipid CBC Cardiac Enzymes/BNP/TSH/INR   Recent Labs   Lab Test 03/05/24  1137 10/21/22  0809 08/07/21  0910   CHOL 213*   < > 172   HDL 59   < > 60   *   < > 91   TRIG 152*   < > 118   CHOLHDLRATIO  --   --  2.9    < > = values in this interval not displayed.     Recent Labs   Lab Test 03/05/24  1137 10/21/22  0809 08/07/21  0910   * 85 91     Recent Labs   Lab Test 01/24/24  1333      POTASSIUM 4.1   CHLORIDE 97*   CO2 26   *   BUN 37.3*   CR 2.26*   GFRESTIMATED 21*   JACK 9.6     Recent Labs   Lab Test 01/24/24  1333 12/29/23  1144 06/15/23  0802   CR 2.26* 2.32* 1.63*     Recent Labs   Lab Test 03/05/24  1137 06/15/23  0802 10/21/22  0809   A1C 8.6* 7.9* 8.1*          Recent Labs   Lab Test 03/05/24  1137 01/24/24  1333   WBC  --  10.9   HGB 10.7* 10.5*   HCT  --  33.7*   MCV  --  90   PLT  --  283     Recent Labs   Lab Test 03/05/24  1137 01/24/24  1333 06/15/23  0802   HGB 10.7* 10.5* 12.0    No results for input(s): \"TROPONINI\" in the last 87724 hours.  No results for input(s): \"BNP\", \"NTBNPI\", \"NTBNP\" in the last 20299 hours.  No results for input(s): \"TSH\" in the last 98893 hours.  Recent Labs   Lab Test 04/02/24  1312 03/19/24  1254 03/05/24  1137   INR 2.0* 2.2* 2.5*        Lucio Espinoza DO      Today's clinic visit entailed:  38 minutes spent by me on the date of the encounter doing chart review, history and exam, documentation and further activities per the note.   The longitudinal plan of care for the diagnosis(es)/condition(s) as documented were addressed during this visit. Due to the added complexity in care, I will continue to support Yoselyn in the subsequent management and with ongoing continuity of care.      Thank you for allowing me to participate in the care of your patient.      Sincerely,     Lucio Espinoza DO     [facility]" [facility]  cc:   Lucio Espinoza DO  [address] [place] [address]

## 2024-07-25 DIAGNOSIS — K21.00 GASTROESOPHAGEAL REFLUX DISEASE WITH ESOPHAGITIS WITHOUT HEMORRHAGE: ICD-10-CM

## 2024-07-25 RX ORDER — PANTOPRAZOLE SODIUM 40 MG/1
40 TABLET, DELAYED RELEASE ORAL DAILY
Qty: 90 TABLET | Refills: 2 | Status: SHIPPED | OUTPATIENT
Start: 2024-07-25

## 2024-07-30 ENCOUNTER — ANCILLARY PROCEDURE (OUTPATIENT)
Dept: CARDIOLOGY | Facility: CLINIC | Age: 79
End: 2024-07-30
Attending: INTERNAL MEDICINE
Payer: COMMERCIAL

## 2024-07-30 DIAGNOSIS — I25.10 CORONARY ARTERY DISEASE INVOLVING NATIVE CORONARY ARTERY OF NATIVE HEART WITHOUT ANGINA PECTORIS: ICD-10-CM

## 2024-07-30 DIAGNOSIS — E78.2 MIXED HYPERLIPIDEMIA: ICD-10-CM

## 2024-07-30 DIAGNOSIS — I10 PRIMARY HYPERTENSION: ICD-10-CM

## 2024-07-30 DIAGNOSIS — E78.5 HYPERLIPIDEMIA LDL GOAL <70: ICD-10-CM

## 2024-07-30 DIAGNOSIS — Z78.9 STATIN INTOLERANCE: ICD-10-CM

## 2024-07-30 LAB — LVEF ECHO: NORMAL

## 2024-07-30 PROCEDURE — 93306 TTE W/DOPPLER COMPLETE: CPT | Performed by: INTERNAL MEDICINE

## 2024-08-06 ENCOUNTER — ANCILLARY PROCEDURE (OUTPATIENT)
Dept: GENERAL RADIOLOGY | Facility: CLINIC | Age: 79
End: 2024-08-06
Attending: INTERNAL MEDICINE
Payer: COMMERCIAL

## 2024-08-06 ENCOUNTER — APPOINTMENT (OUTPATIENT)
Dept: AUDIOLOGY | Facility: CLINIC | Age: 79
End: 2024-08-06
Payer: COMMERCIAL

## 2024-08-06 ENCOUNTER — OFFICE VISIT (OUTPATIENT)
Dept: FAMILY MEDICINE | Facility: CLINIC | Age: 79
End: 2024-08-06
Payer: COMMERCIAL

## 2024-08-06 VITALS
HEIGHT: 62 IN | WEIGHT: 145 LBS | BODY MASS INDEX: 26.68 KG/M2 | SYSTOLIC BLOOD PRESSURE: 142 MMHG | RESPIRATION RATE: 20 BRPM | TEMPERATURE: 97.4 F | OXYGEN SATURATION: 99 % | DIASTOLIC BLOOD PRESSURE: 64 MMHG | HEART RATE: 64 BPM

## 2024-08-06 DIAGNOSIS — I10 PRIMARY HYPERTENSION: ICD-10-CM

## 2024-08-06 DIAGNOSIS — I50.31 ACUTE DIASTOLIC HEART FAILURE (H): ICD-10-CM

## 2024-08-06 DIAGNOSIS — Z79.4 TYPE 2 DIABETES MELLITUS WITH BOTH EYES AFFECTED BY MILD NONPROLIFERATIVE RETINOPATHY WITHOUT MACULAR EDEMA, WITH LONG-TERM CURRENT USE OF INSULIN (H): ICD-10-CM

## 2024-08-06 DIAGNOSIS — I50.31 ACUTE DIASTOLIC HEART FAILURE (H): Primary | ICD-10-CM

## 2024-08-06 DIAGNOSIS — I25.10 ATHEROSCLEROSIS OF NATIVE CORONARY ARTERY OF NATIVE HEART WITHOUT ANGINA PECTORIS: ICD-10-CM

## 2024-08-06 DIAGNOSIS — E11.3293 TYPE 2 DIABETES MELLITUS WITH BOTH EYES AFFECTED BY MILD NONPROLIFERATIVE RETINOPATHY WITHOUT MACULAR EDEMA, WITH LONG-TERM CURRENT USE OF INSULIN (H): ICD-10-CM

## 2024-08-06 DIAGNOSIS — J06.9 UPPER RESPIRATORY TRACT INFECTION, UNSPECIFIED TYPE: ICD-10-CM

## 2024-08-06 DIAGNOSIS — Z53.9 ERRONEOUS ENCOUNTER--DISREGARD: Primary | ICD-10-CM

## 2024-08-06 LAB
ANION GAP SERPL CALCULATED.3IONS-SCNC: 9 MMOL/L (ref 7–15)
BUN SERPL-MCNC: 19.5 MG/DL (ref 8–23)
CALCIUM SERPL-MCNC: 9.6 MG/DL (ref 8.8–10.4)
CHLORIDE SERPL-SCNC: 107 MMOL/L (ref 98–107)
CREAT SERPL-MCNC: 1.42 MG/DL (ref 0.51–0.95)
EGFRCR SERPLBLD CKD-EPI 2021: 37 ML/MIN/1.73M2
ERYTHROCYTE [DISTWIDTH] IN BLOOD BY AUTOMATED COUNT: 13.5 % (ref 10–15)
GLUCOSE SERPL-MCNC: 153 MG/DL (ref 70–99)
HBA1C MFR BLD: 7.8 % (ref 0–5.6)
HCO3 SERPL-SCNC: 25 MMOL/L (ref 22–29)
HCT VFR BLD AUTO: 36.8 % (ref 35–47)
HGB BLD-MCNC: 11.5 G/DL (ref 11.7–15.7)
MCH RBC QN AUTO: 27.3 PG (ref 26.5–33)
MCHC RBC AUTO-ENTMCNC: 31.3 G/DL (ref 31.5–36.5)
MCV RBC AUTO: 87 FL (ref 78–100)
NT-PROBNP SERPL-MCNC: 4689 PG/ML (ref 0–1800)
PLATELET # BLD AUTO: 227 10E3/UL (ref 150–450)
POTASSIUM SERPL-SCNC: 4.2 MMOL/L (ref 3.4–5.3)
RBC # BLD AUTO: 4.22 10E6/UL (ref 3.8–5.2)
SARS-COV-2 RNA RESP QL NAA+PROBE: NEGATIVE
SODIUM SERPL-SCNC: 141 MMOL/L (ref 135–145)
WBC # BLD AUTO: 8.1 10E3/UL (ref 4–11)

## 2024-08-06 PROCEDURE — 85027 COMPLETE CBC AUTOMATED: CPT | Performed by: INTERNAL MEDICINE

## 2024-08-06 PROCEDURE — 36415 COLL VENOUS BLD VENIPUNCTURE: CPT | Performed by: INTERNAL MEDICINE

## 2024-08-06 PROCEDURE — G2211 COMPLEX E/M VISIT ADD ON: HCPCS | Performed by: INTERNAL MEDICINE

## 2024-08-06 PROCEDURE — 87635 SARS-COV-2 COVID-19 AMP PRB: CPT | Performed by: INTERNAL MEDICINE

## 2024-08-06 PROCEDURE — 83880 ASSAY OF NATRIURETIC PEPTIDE: CPT | Performed by: INTERNAL MEDICINE

## 2024-08-06 PROCEDURE — 71046 X-RAY EXAM CHEST 2 VIEWS: CPT | Mod: TC | Performed by: RADIOLOGY

## 2024-08-06 PROCEDURE — 83036 HEMOGLOBIN GLYCOSYLATED A1C: CPT | Performed by: INTERNAL MEDICINE

## 2024-08-06 PROCEDURE — 93000 ELECTROCARDIOGRAM COMPLETE: CPT | Performed by: INTERNAL MEDICINE

## 2024-08-06 PROCEDURE — 99214 OFFICE O/P EST MOD 30 MIN: CPT | Performed by: INTERNAL MEDICINE

## 2024-08-06 PROCEDURE — 80048 BASIC METABOLIC PNL TOTAL CA: CPT | Performed by: INTERNAL MEDICINE

## 2024-08-06 ASSESSMENT — ENCOUNTER SYMPTOMS: SHORTNESS OF BREATH: 1

## 2024-08-06 NOTE — ASSESSMENT & PLAN NOTE
-CABG 12/18/23  for NSTEMI  -Echo 7/30/24  Interpretation Summary     Technically difficult, suboptimal study. IV echo contrast not used at patient  request.     1. The left ventricle is not well visualized. Left ventricular function is  decreased. The ejection fraction is 50-55% (borderline). There is mild  concentric left ventricular hypertrophy. Left ventricular diastolic function  is abnormal. Grade III or advanced diastolic dysfunction. There is mild global  hypokinesia of the left ventricle.  2. The right ventricle is not well visualized. Normal right ventricle size and  systolic function.  3. The left atrium is moderate to severely dilated.  4. No hemodynamically significant valvular abnormalities on 2D or color flow  imaging. There is no comparison study available.  _________________________________________

## 2024-08-06 NOTE — PROGRESS NOTES
"  Assessment & Plan     Primary hypertension  Goals today    Atherosclerosis of native coronary artery of native heart without angina pectoris  No angina or exertional symptoms    Upper respiratory tract infection, unspecified type  URI symptoms for a week.  - Symptomatic COVID-19 Virus (Coronavirus) by PCR    Acute diastolic heart failure (H)  Complains of orthopnea but exam is without evidence of heart failure.  7/30/2024 echocardiogram revealing improving left ventricular function with an EF of 50 to 55%  - XR Chest 2 Views; Future  - EKG 12-lead complete w/read - Clinics  - BNP-N terminal pro; Future  - CBC with platelets; Future  - Basic metabolic panel  (Ca, Cl, CO2, Creat, Gluc, K, Na, BUN); Future    Type 2 diabetes mellitus with both eyes affected by mild nonproliferative retinopathy without macular edema, with long-term current use of insulin (H)  Good control and no hypoglycemia  - Hemoglobin A1c; Future          BMI  Estimated body mass index is 26.52 kg/m  as calculated from the following:    Height as of this encounter: 1.575 m (5' 2\").    Weight as of this encounter: 65.8 kg (145 lb).             Renae Topete is a 79 year old, presenting for the following health issues:  Shortness of Breath (Pt c/o SOB and productive cough x 3-4 days) and Hypertension (Pt also c/o elevated BP reading at home)        8/6/2024    10:28 AM   Additional Questions   Roomed by Srikanth CARDENAS     History of Present Illness       Reason for visit:  Cold hard to breathe    She eats 2-3 servings of fruits and vegetables daily.She consumes 2 sweetened beverage(s) daily.She exercises with enough effort to increase her heart rate 20 to 29 minutes per day.  She exercises with enough effort to increase her heart rate 3 or less days per week. She is missing 1 dose(s) of medications per week.     Shift recently lost her  who was 87 and chronically ill rather suddenly.  She has noticed higher blood pressure readings.  She " "complains of orthopnea for about 2 weeks.  No chest pressure or pain.  No edema.  For about a week she has had nasal congestion, myalgia, headache, nonproductive cough.  No fever or chills.      Hypertension Follow-up    Do you check your blood pressure regularly outside of the clinic? Yes   Are you following a low salt diet? Yes  Are your blood pressures ever more than 140 on the top number (systolic) OR more   than 90 on the bottom number (diastolic), for example 140/90? Yes    How many days per week do you miss taking your medication? 0        Review of Systems  Constitutional, HEENT, cardiovascular, pulmonary, gi and gu systems are negative, except as otherwise noted.      Objective    BP (!) 142/64   Pulse 64   Temp 97.4  F (36.3  C) (Tympanic)   Resp 20   Ht 1.575 m (5' 2\")   Wt 65.8 kg (145 lb)   LMP  (LMP Unknown)   SpO2 99%   BMI 26.52 kg/m    Body mass index is 26.52 kg/m .  Physical Exam   Healthy-appearing older woman in no distress.  No increased work of breathing  HEENT exam unremarkable  Eyes appear normal  Neck supple no nodes or thyromegaly  Lungs clear to auscultation and percussion  Cardiac exam regular soft systolic murmur-distribution, no edema, normal carotid pulsations  Abdomen nontender  Alert, oriented, speech and cognition intact, no facial asymmetry, EOMI, normal strength and gait  Normal mood and affect    CXR - Reviewed and interpreted by me sternotomy and left atrial appendage device otherwise negative.  No infiltrate or failure.  EKG - Reviewed and interpreted by me sinus bradycardia with a rate of 58, possible old inferior MI, possible old anterior MI with poor R wave progression, nonspecific T wave changes laterally, no EKG for comparison        Signed Electronically by: Bahman Patterson MD    "

## 2024-08-07 ENCOUNTER — TELEPHONE (OUTPATIENT)
Dept: FAMILY MEDICINE | Facility: CLINIC | Age: 79
End: 2024-08-07
Payer: MEDICARE

## 2024-08-07 NOTE — TELEPHONE ENCOUNTER
"  General Call    Contacts       Contact Date/Time Type Contact Phone/Fax    08/07/2024 01:48 PM CDT Phone (Incoming) Yoselyn Doyle (Self)           Reason for Call:  Patient requesting Dexa Referral be faxed to [facility].  Patient \"will call to schedule an appointment if they don't call her.\"  Writer faxed referral to Imaging at [facility]  Fax number:  [phone]  Please call patient if there are any questions.    Could we send this information to you in [employer] or would you prefer to receive a phone call?:   Patient would prefer a phone call   Okay to leave a detailed message?: Yes at Cell number on file:    Telephone Information:   Mobile [phone]     "

## 2024-08-07 NOTE — TELEPHONE ENCOUNTER
[facility] Scheduling. Updated with DX for Bone Density Scan.  Diagnosis  M85.89 (ICD-10-CM) - Osteopenia of multiple sites

## 2024-08-08 DIAGNOSIS — I50.31 ACUTE DIASTOLIC HEART FAILURE (H): Primary | ICD-10-CM

## 2024-08-08 RX ORDER — TORSEMIDE 5 MG/1
5 TABLET ORAL DAILY
Qty: 90 TABLET | Refills: 0 | Status: SHIPPED | OUTPATIENT
Start: 2024-08-08 | End: 2024-10-07

## 2024-09-09 ENCOUNTER — TRANSFERRED RECORDS (OUTPATIENT)
Dept: HEALTH INFORMATION MANAGEMENT | Facility: CLINIC | Age: 79
End: 2024-09-09
Payer: MEDICARE

## 2024-09-09 LAB — RETINOPATHY: POSITIVE

## 2024-10-07 ENCOUNTER — OFFICE VISIT (OUTPATIENT)
Dept: FAMILY MEDICINE | Facility: CLINIC | Age: 79
End: 2024-10-07
Payer: COMMERCIAL

## 2024-10-07 VITALS
SYSTOLIC BLOOD PRESSURE: 134 MMHG | HEIGHT: 62 IN | OXYGEN SATURATION: 95 % | WEIGHT: 146.3 LBS | BODY MASS INDEX: 26.92 KG/M2 | DIASTOLIC BLOOD PRESSURE: 64 MMHG | HEART RATE: 55 BPM | RESPIRATION RATE: 16 BRPM | TEMPERATURE: 97.2 F

## 2024-10-07 DIAGNOSIS — E11.3293 TYPE 2 DIABETES MELLITUS WITH BOTH EYES AFFECTED BY MILD NONPROLIFERATIVE RETINOPATHY WITHOUT MACULAR EDEMA, WITH LONG-TERM CURRENT USE OF INSULIN (H): ICD-10-CM

## 2024-10-07 DIAGNOSIS — I10 ESSENTIAL HYPERTENSION: ICD-10-CM

## 2024-10-07 DIAGNOSIS — Z83.2 FH: FACTOR V LEIDEN MUTATION: ICD-10-CM

## 2024-10-07 DIAGNOSIS — Z79.4 TYPE 2 DIABETES MELLITUS WITH BOTH EYES AFFECTED BY MILD NONPROLIFERATIVE RETINOPATHY WITHOUT MACULAR EDEMA, WITH LONG-TERM CURRENT USE OF INSULIN (H): ICD-10-CM

## 2024-10-07 DIAGNOSIS — I21.4 NSTEMI (NON-ST ELEVATED MYOCARDIAL INFARCTION) (H): Primary | ICD-10-CM

## 2024-10-07 LAB
FACTOR V INTERPRETATION: ABNORMAL
LAB DIRECTOR COMMENTS: ABNORMAL
LAB DIRECTOR DISCLAIMER: ABNORMAL
LAB DIRECTOR INTERPRETATION: ABNORMAL
LAB DIRECTOR METHODOLOGY: ABNORMAL
LAB DIRECTOR RESULTS: ABNORMAL
SPECIMEN DESCRIPTION: ABNORMAL

## 2024-10-07 PROCEDURE — 80048 BASIC METABOLIC PNL TOTAL CA: CPT | Performed by: PHYSICIAN ASSISTANT

## 2024-10-07 PROCEDURE — 36415 COLL VENOUS BLD VENIPUNCTURE: CPT | Performed by: PHYSICIAN ASSISTANT

## 2024-10-07 PROCEDURE — 81241 F5 GENE: CPT | Performed by: PHYSICIAN ASSISTANT

## 2024-10-07 PROCEDURE — 99495 TRANSJ CARE MGMT MOD F2F 14D: CPT | Performed by: PHYSICIAN ASSISTANT

## 2024-10-07 PROCEDURE — G0452 MOLECULAR PATHOLOGY INTERPR: HCPCS | Performed by: PATHOLOGY

## 2024-10-07 RX ORDER — AMLODIPINE BESYLATE 5 MG/1
5 TABLET ORAL 2 TIMES DAILY
Qty: 60 TABLET | Refills: 3 | Status: SHIPPED | OUTPATIENT
Start: 2024-10-07 | End: 2024-11-13

## 2024-10-07 RX ORDER — AMLODIPINE BESYLATE 5 MG/1
5 TABLET ORAL 2 TIMES DAILY
COMMUNITY
Start: 2024-10-03 | End: 2024-10-07

## 2024-10-07 RX ORDER — ROSUVASTATIN CALCIUM 5 MG/1
5 TABLET, COATED ORAL
COMMUNITY
Start: 2024-10-09

## 2024-10-07 NOTE — PROGRESS NOTES
"  Assessment & Plan     (I21.4) NSTEMI (non-ST elevated myocardial infarction) (H)  (primary encounter diagnosis)  Comment: No current symptoms  Plan: Basic metabolic panel        Cardiology did not feel that angiogram was needed she will follow-up with them as an outpatient    (Z83.2) FH: factor V Leiden mutation  Comment: Family history  Plan: Factor 5 leiden mutation analysis        Will draw    (I10) Essential hypertension  Comment: Controlled  Plan: amLODIPine (NORVASC) 5 MG tablet, Basic         metabolic panel        Continue current medication    (E11.3293,  Z79.4) Type 2 diabetes mellitus with both eyes affected by mild nonproliferative retinopathy without macular edema, with long-term current use of insulin (H)  Comment: Stable  Plan: Continue to follow with Dr. Ag ESCALANTE REC REQUIRED  Post Medication Reconciliation Status:   BMI  Estimated body mass index is 26.76 kg/m  as calculated from the following:    Height as of this encounter: 1.575 m (5' 2\").    Weight as of this encounter: 66.4 kg (146 lb 4.8 oz).             Renae Topete is a 79 year old, presenting for the following health issues:  Hospital F/U (10/2/24-10/3/24 [facility] for NSTEMI )        10/7/2024     1:33 PM   Additional Questions   Roomed by THERESA Kee     Patient presents to clinic for recent emergency room/hospital stay for uncontrolled hypertension hypertension urgency and  non-STEMI  Her medications were adjusted while she was in the hospital  She feels great  There is family history in his son and grandchild with factor V Leiden deficiency she is interested in being checked at the hospital and noted this in her discharge summary but stated we could do that as an outpatient  No chest pain no shortness of breath no peripheral edema  She is tolerating the adjustment in medications.             Hospital Follow-up Visit:    Hospital/Nursing Home/IP Rehab Facility:  [facility]  Date of Admission: " "10/2/24  Date of Discharge: 10/3/24  Reason(s) for Admission: NSTEMI/HTN   Was the patient in the ICU or did the patient experience delirium during hospitalization?  No  Do you have any other stressors you would like to discuss with your provider? No    Problems taking medications regularly:  None  Medication changes since discharge:   Problems adhering to non-medication therapy:  None    Summary of hospitalization:  CareEverywhere information obtained and reviewed  Diagnostic Tests/Treatments reviewed.  Follow up needed: none  Other Healthcare Providers Involved in Patient s Care:          Cardiology  Update since discharge: improved.         Plan of care communicated with patient                       Objective    /64   Pulse 55   Temp 97.2  F (36.2  C) (Tympanic)   Resp 16   Ht 1.575 m (5' 2\")   Wt 66.4 kg (146 lb 4.8 oz)   LMP  (LMP Unknown)   SpO2 95%   BMI 26.76 kg/m    Body mass index is 26.76 kg/m .  Physical Exam alert attentive no acute distress  Lungs clear well ventilated  Cardiovascular rate and rhythm murmurs noted  No peripheral edema              Signed Electronically by: MARGOTH Bennett    "

## 2024-10-08 LAB
ANION GAP SERPL CALCULATED.3IONS-SCNC: 11 MMOL/L (ref 7–15)
BUN SERPL-MCNC: 23.6 MG/DL (ref 8–23)
CALCIUM SERPL-MCNC: 9.5 MG/DL (ref 8.8–10.4)
CHLORIDE SERPL-SCNC: 107 MMOL/L (ref 98–107)
CREAT SERPL-MCNC: 1.54 MG/DL (ref 0.51–0.95)
EGFRCR SERPLBLD CKD-EPI 2021: 34 ML/MIN/1.73M2
GLUCOSE SERPL-MCNC: 117 MG/DL (ref 70–99)
HCO3 SERPL-SCNC: 22 MMOL/L (ref 22–29)
POTASSIUM SERPL-SCNC: 4.2 MMOL/L (ref 3.4–5.3)
SODIUM SERPL-SCNC: 140 MMOL/L (ref 135–145)

## 2024-10-21 ENCOUNTER — TELEPHONE (OUTPATIENT)
Dept: FAMILY MEDICINE | Facility: CLINIC | Age: 79
End: 2024-10-21
Payer: MEDICARE

## 2024-10-21 NOTE — TELEPHONE ENCOUNTER
Patient was started on amlodipine at hospitalization on 10/2.  She noticed an improvement in her blood pressure, but had swelling in her legs and difficulty breathing.  She stopped taking her amlodipine on Saturday and also took a Bumex.  She does not have this on her current med list, but has some from a previous rx.   Symptoms have improved, she states that she is still a little bit still short of breath, but much better than previously and she is feeling better.      She does check her blood pressure at home and it had been wnl, but yesterday it was 167/70.  She did not take it today.    She denies any headache, vision changes or dizziness.    Please advise if you would like a new rx sent or appointment made in clinic.    VANESSA Jiang RN  [facility]

## 2024-10-22 NOTE — TELEPHONE ENCOUNTER
Call to patient and relayed message below. She declines to schedule appointment at this time, and said she will call back to schedule.

## 2024-10-29 DIAGNOSIS — E11.3293 TYPE 2 DIABETES MELLITUS WITH MILD NONPROLIFERATIVE RETINOPATHY OF BOTH EYES, WITH LONG-TERM CURRENT USE OF INSULIN, MACULAR EDEMA PRESENCE UNSPECIFIED (H): ICD-10-CM

## 2024-10-29 DIAGNOSIS — Z79.4 TYPE 2 DIABETES MELLITUS WITH MILD NONPROLIFERATIVE RETINOPATHY OF BOTH EYES, WITH LONG-TERM CURRENT USE OF INSULIN, MACULAR EDEMA PRESENCE UNSPECIFIED (H): ICD-10-CM

## 2024-10-30 RX ORDER — INSULIN GLARGINE 300 U/ML
INJECTION, SOLUTION SUBCUTANEOUS
Qty: 12 ML | Refills: 3 | Status: SHIPPED | OUTPATIENT
Start: 2024-10-30

## 2024-11-08 DIAGNOSIS — I48.0 PAROXYSMAL ATRIAL FIBRILLATION (H): ICD-10-CM

## 2024-11-08 RX ORDER — CARVEDILOL 12.5 MG/1
12.5 TABLET ORAL 2 TIMES DAILY WITH MEALS
Qty: 180 TABLET | Refills: 3 | OUTPATIENT
Start: 2024-11-08

## 2024-11-11 ENCOUNTER — TELEPHONE (OUTPATIENT)
Dept: FAMILY MEDICINE | Facility: CLINIC | Age: 79
End: 2024-11-11
Payer: MEDICARE

## 2024-11-11 NOTE — TELEPHONE ENCOUNTER
Call to pharmacy, they do have prescription for the Carvedilol 12.5. mg on file. They will get this ready for patient.   RN called patient and let her know this will be ready for pickup. Patient states appreciation, no additional questions at this time.

## 2024-11-13 ENCOUNTER — OFFICE VISIT (OUTPATIENT)
Dept: FAMILY MEDICINE | Facility: CLINIC | Age: 79
End: 2024-11-13
Payer: COMMERCIAL

## 2024-11-13 VITALS
BODY MASS INDEX: 26.87 KG/M2 | OXYGEN SATURATION: 99 % | TEMPERATURE: 97.7 F | DIASTOLIC BLOOD PRESSURE: 79 MMHG | RESPIRATION RATE: 14 BRPM | HEART RATE: 60 BPM | HEIGHT: 62 IN | WEIGHT: 146 LBS | SYSTOLIC BLOOD PRESSURE: 173 MMHG

## 2024-11-13 DIAGNOSIS — E11.3293 TYPE 2 DIABETES MELLITUS WITH BOTH EYES AFFECTED BY MILD NONPROLIFERATIVE RETINOPATHY WITHOUT MACULAR EDEMA, WITH LONG-TERM CURRENT USE OF INSULIN (H): ICD-10-CM

## 2024-11-13 DIAGNOSIS — N18.32 STAGE 3B CHRONIC KIDNEY DISEASE (H): ICD-10-CM

## 2024-11-13 DIAGNOSIS — I50.9 CHRONIC CONGESTIVE HEART FAILURE, UNSPECIFIED HEART FAILURE TYPE (H): ICD-10-CM

## 2024-11-13 DIAGNOSIS — E11.3299 NONPROLIFERATIVE DIABETIC RETINOPATHY (H): Primary | ICD-10-CM

## 2024-11-13 DIAGNOSIS — I48.0 PAROXYSMAL ATRIAL FIBRILLATION (H): ICD-10-CM

## 2024-11-13 DIAGNOSIS — E78.2 MIXED HYPERLIPIDEMIA: ICD-10-CM

## 2024-11-13 DIAGNOSIS — I10 PRIMARY HYPERTENSION: ICD-10-CM

## 2024-11-13 DIAGNOSIS — D68.51 FACTOR V LEIDEN MUTATION (H): ICD-10-CM

## 2024-11-13 DIAGNOSIS — Z79.4 TYPE 2 DIABETES MELLITUS WITH BOTH EYES AFFECTED BY MILD NONPROLIFERATIVE RETINOPATHY WITHOUT MACULAR EDEMA, WITH LONG-TERM CURRENT USE OF INSULIN (H): ICD-10-CM

## 2024-11-13 PROCEDURE — 99214 OFFICE O/P EST MOD 30 MIN: CPT | Performed by: INTERNAL MEDICINE

## 2024-11-13 RX ORDER — CARVEDILOL 25 MG/1
25 TABLET ORAL 2 TIMES DAILY WITH MEALS
Qty: 180 TABLET | Refills: 3 | Status: SHIPPED | OUTPATIENT
Start: 2024-11-13

## 2024-11-13 RX ORDER — CHLORTHALIDONE 25 MG/1
25 TABLET ORAL DAILY
Qty: 90 TABLET | Refills: 3 | Status: SHIPPED | OUTPATIENT
Start: 2024-11-13

## 2024-11-13 NOTE — PATIENT INSTRUCTIONS
Increase your carvedilol up to 25 mg twice daily (up from 12.5mg daily)  Reintroducing chlorthalidone 25 mg daily (you had been taking this medicine long-term prior to your bypass surgery)  Remain off amlodipine  I would like you to try to increase the frequency of your rosuvastatin (currently taking once per week).  Optimally, by the time you see Dr. Espinoza in January, I had like to see if he can tolerate taking this medication on a nightly basis.

## 2024-11-13 NOTE — ASSESSMENT & PLAN NOTE
Baseline SCr 1.4-1.7  - post CABG rise in Scr to 2.8 (stabilized ~2-2.2)  - not maintained on ACEi/ARB (prior angioedema hx)

## 2024-11-13 NOTE — PROGRESS NOTES
Assessment & Plan   Problem List Items Addressed This Visit          Endocrine    Type 2 diabetes mellitus with both eyes affected by mild nonproliferative retinopathy without macular edema, with long-term current use of insulin (H)     controlled   hypoglycemic medications: intolerant to metformin due to GI symptoms  insulin therapy: Toujeo 20 units, Humalog 12-15 units TID + SSI   - ICR 1:3, sensitivity 10 (~20 units q meal)   - using CGMS/Freestyle Adry and seeing significant benefits from use  last HbA1c: 7.8%   microvascular complications: proliferative retinopathy - sees ophtho regularly, + microalbuminuria  macrovascular complications: CAD  ASA/GILDA/statin:  DAPT,  atorvastatin 40mg    - previous myalgias on higher dose of statin   - angioedema development on lisinopril and irbesartan    - avoidance of classes for now     Patient has met criteria for CGM coverage;   - patient has type 2 diabetes mellitus; and  - patient has been using a blood glucose monitor and performing four or more blood glucose test per day; and  - patient is insulin treated with multiple daily injections of three or more times per day; and  - within six months prior to ordering the CGM, the patient has had an in-person visit with the practitioner; and determined that criteria (1-4) above are met; and   - every six months following the initial prescription of the CGM, the treating practitioner has an in-person visit with the patient to assess adherence to their CGM regimen and diabetes treatment plan controlled          Mixed hyperlipidemia    Nonproliferative diabetic retinopathy (H) - Primary       Circulatory     HTN  uncontrolled  current antihypertensive regimen: carvedilol 12.5mg BID  regimen changes: increase carvedilol to 25mg BID, add back chlorthalidone 25mg daily  Intolerance: ACEi/ARB - angioedema hx, high dose amlodipine (edema)  future titration/work-up plan:   Prior to CABG, Previously maintained on  "atenolol/chlorthalidone 50/25mg, amlodipine 5mg    Perioperative atrial fibrillation after bypass  -No further A-fib burden; okayed by cardiology to stop warfarin    CAD; 4VCABG  2/2023: NSTEMI presentation, admitted to [facility] -undergoing subsequent CABG   - Post off pump coronary artery bypass grafting x 4: LIMA to LAD, sequential saphenous vein graft to diagonal and OM, saphenous vein graft to PDA, endoscopic vein harvest of left lower extremity, left atrial appendage occlusion with 45 mm Atriclip, direct current cardioversion on 12/19/2023   - on carvedilol BID  - ASA + clopidogrel (recs for DAPT for 1 yr) + warfarin  - h/o statin intolerance (concerns with LFTs elevation as well)   - 2/2024: started on ezetimibe   - cardiology exploring PCSK9 inhibitor   - 11/2024: Trial on rosuvastatin 5 mg weekly -encouraged to titrate up frequency of use as tolerated.         Primary hypertension    Relevant Medications    chlorthalidone (HYGROTON) 25 MG tablet    Paroxysmal atrial fibrillation (H)    Relevant Medications    carvedilol (COREG) 25 MG tablet    CHF (congestive heart failure) (H)       Urinary    Stage 3b chronic kidney disease (H)     Baseline SCr 1.4-1.7  - post CABG rise in Scr to 2.8 (stabilized ~2-2.2)  - not maintained on ACEi/ARB (prior angioedema hx)            Hematologic    Factor V Leiden mutation (H)     Heterozygote, carrier for factor V Leiden mutation                MED REC REQUIRED  Post Medication Reconciliation Status:  Discharge medications reconciled, continue medications without change  BMI  Estimated body mass index is 26.7 kg/m  as calculated from the following:    Height as of this encounter: 1.575 m (5' 2\").    Weight as of this encounter: 66.2 kg (146 lb).           Renae Topete is a 79 year old, presenting for the following health issues: Presents for follow-up related to hospitalization at [facility] in the setting of hypertensive urgency/non-ST elevation MI " "infarction.  Presenting with accelerated blood pressures and highly sensitive troponin peaks in the 500s in absence of any chest pain complaints.  Transferred to [facility] for cardiac assessment.  No pursuits for any ischemia evaluation including heart catheterizations.  Conclusions of transient ischemia in context of uncontrolled blood pressures.  She was continued on carvedilol and reintroduced to amlodipine at 5 mg twice daily.  Unfortunately was not able to remain on amlodipine due to significant edema fluid retention; subsequently stopped.  Has been monitoring blood pressures at home which have also been consistently elevated with systolic blood pressures in the 170s to 200s.  Denies any anginal complaints.  No shortness of breath.  Hospital F/U        11/13/2024     9:36 AM   Additional Questions   Roomed by Nilam FLOWERS     Hospitals in [place]         Hospital Follow-up Visit:    Hospital/Nursing Home/IP Rehab Facility:  [facility]  Date of Admission: 10/02/2024  Date of Discharge: 10/03/2024  Reason(s) for Admission: HTN  Was the patient in the ICU or did the patient experience delirium during hospitalization?  No  Do you have any other stressors you would like to discuss with your provider? No    Problems taking medications regularly:  None  Medication changes since discharge: None  Problems adhering to non-medication therapy:  None    Summary of hospitalization:  CareEverywhere information obtained and reviewed  Diagnostic Tests/Treatments reviewed.  Follow up needed: none  Other Healthcare Providers Involved in Patient s Care:         None  Update since discharge: improved.         Plan of care communicated with patient             Review of Systems  Constitutional, HEENT, cardiovascular, pulmonary, gi and gu systems are negative, except as otherwise noted.      Objective    BP (!) 183/82   Pulse 60   Temp 97.7  F (36.5  C)   Resp 14   Ht 1.575 m (5' 2\")   Wt 66.2 kg (146 lb)   LMP  (LMP Unknown)   SpO2 " 99%   BMI 26.70 kg/m    Body mass index is 26.7 kg/m .  Physical Exam   GENERAL: alert and no distress  NECK: no adenopathy, no asymmetry, masses, or scars  RESP: lungs clear to auscultation - no rales, rhonchi or wheezes  CV: regular rate and rhythm, normal S1 S2, no S3 or S4, no murmur, click or rub, no peripheral edema  ABDOMEN: soft, nontender, no hepatosplenomegaly, no masses and bowel sounds normal  MS: no gross musculoskeletal defects noted, no edema    Office Visit on 10/07/2024   Component Date Value Ref Range Status    METHODOLOGY 10/07/2024    Final                    Value:The regions of genomic DNA containing the F5 gene mutation R506Q(1691G>A)                           and the Factor 2 (Prothrombin [id number]) gene mutation were simultaneously                           amplified using the polymerase chain reaction. The amplified products were                           digested with restriction endonuclease TaqI and products were analyzed by                           gel electrophoresis.                               RESULTS 10/07/2024    Final                    Value:                          Factor V 1691G>A (Leiden)  RESULTS:                          Mutation analyzed: 1691G>A                          Factor V 1691G>A (Leiden)  Interpretation:  PRESENT                          Factor V 1691G>A (Leiden) mutation  genotype:  G/A                              INTERPRETATION 10/07/2024    Final                    Value:The patient is a heterozygote (one copy of the gene positive) for the F5                           gene mutation R506Q (1691G>A) mutation. The presence of the F5 gene                           mutation R506Q (1691G>A) mutation is an indication of an increased risk of                           developing a thrombosis. The extent of this risk is dependent upon several                           other known thrombophilic risk factors including smoking, obesity and the                            use of oral contraceptives. Consultation regarding these results is                           available upon request. Genetic counseling regarding these results is                           indicated in the evaluation of other family members.                          (Electronically signed by: Orlando Senior MD October 10, 2024 7:34                           PM)    COMMENTS 10/07/2024    Final                    Value:If a patient is the recipient of an allogeneic bone marrow transplant,                           this test must be done on a pre-transplant sample or buccal swab.  A                           previous allogeneic bone marrow transplant will interfere with test                           results.  Call the [facility] ([phone]) for                           instructions on sample collection for these patients.    DISCLAIMER 10/07/2024    Final                    Value:This test was developed and its performance characteristics determined by                           [facility]. It has not been                           cleared or approved by the FDA. The laboratory is regulated under CLIA as                           qualified to perform high-complexity testing. This test is used for                           clinical purposes. It should not be regarded as investigational or for                           research.                                                    A resident/fellow in an accredited training program was involved in the                           selection of testing, review of laboratory data, and/or interpretation of                           this case.  I, as the senior physician, attest that I: (i) confirmed                           appropriate testing, (ii) examined the relevant raw data for the                           specimen(s); and (iii) rendered or confirmed the interpretation(s).                               FACTOR V INTERPRETATION 10/07/2024  (A)   Final                    Value:Factor V 1691G>A (Leiden)  Interpretation:  PRESENT    Specimen Description 10/07/2024    Final                    Value:Blood: ACD    Sodium 10/07/2024 140  135 - 145 mmol/L Final    Potassium 10/07/2024 4.2  3.4 - 5.3 mmol/L Final    Chloride 10/07/2024 107  98 - 107 mmol/L Final    Carbon Dioxide (CO2) 10/07/2024 22  22 - 29 mmol/L Final    Anion Gap 10/07/2024 11  7 - 15 mmol/L Final    Urea Nitrogen 10/07/2024 23.6 (H)  8.0 - 23.0 mg/dL Final    Creatinine 10/07/2024 1.54 (H)  0.51 - 0.95 mg/dL Final    GFR Estimate 10/07/2024 34 (L)  >60 mL/min/1.73m2 Final    eGFR calculated using 2021 CKD-EPI equation.    Calcium 10/07/2024 9.5  8.8 - 10.4 mg/dL Final    Reference intervals for this test were updated on 7/16/2024 to reflect our healthy population more accurately. There may be differences in the flagging of prior results with similar values performed with this method. Those prior results can be interpreted in the context of the updated reference intervals.    Glucose 10/07/2024 117 (H)  70 - 99 mg/dL Final           Signed Electronically by: Chicho Luong MD

## 2024-11-13 NOTE — ASSESSMENT & PLAN NOTE
HTN  uncontrolled  current antihypertensive regimen: carvedilol 12.5mg BID  regimen changes: increase carvedilol to 25mg BID, add back chlorthalidone 25mg daily  Intolerance: ACEi/ARB - angioedema hx, high dose amlodipine (edema)  future titration/work-up plan:   Prior to CABG, Previously maintained on atenolol/chlorthalidone 50/25mg, amlodipine 5mg    Perioperative atrial fibrillation after bypass  -No further A-fib burden; okayed by cardiology to stop warfarin    CAD; 4VCABG  2/2023: NSTEMI presentation, admitted to [facility] -undergoing subsequent CABG   - Post off pump coronary artery bypass grafting x 4: LIMA to LAD, sequential saphenous vein graft to diagonal and OM, saphenous vein graft to PDA, endoscopic vein harvest of left lower extremity, left atrial appendage occlusion with 45 mm Atriclip, direct current cardioversion on 12/19/2023   - on carvedilol BID  - ASA + clopidogrel (recs for DAPT for 1 yr) + warfarin  - h/o statin intolerance (concerns with LFTs elevation as well)   - 2/2024: started on ezetimibe   - cardiology exploring PCSK9 inhibitor   - 11/2024: Trial on rosuvastatin 5 mg weekly -encouraged to titrate up frequency of use as tolerated.

## 2024-11-13 NOTE — ASSESSMENT & PLAN NOTE
controlled   hypoglycemic medications: intolerant to metformin due to GI symptoms  insulin therapy: Toujeo 20 units, Humalog 12-15 units TID + SSI   - ICR 1:3, sensitivity 10 (~20 units q meal)   - using CGMS/Freestyle Adry and seeing significant benefits from use  last HbA1c: 7.8%   microvascular complications: proliferative retinopathy - sees ophtho regularly, + microalbuminuria  macrovascular complications: CAD  ASA/GILDA/statin:  DAPT,  atorvastatin 40mg    - previous myalgias on higher dose of statin   - angioedema development on lisinopril and irbesartan    - avoidance of classes for now     Patient has met criteria for CGM coverage;   - patient has type 2 diabetes mellitus; and  - patient has been using a blood glucose monitor and performing four or more blood glucose test per day; and  - patient is insulin treated with multiple daily injections of three or more times per day; and  - within six months prior to ordering the CGM, the patient has had an in-person visit with the practitioner; and determined that criteria (1-4) above are met; and   - every six months following the initial prescription of the CGM, the treating practitioner has an in-person visit with the patient to assess adherence to their CGM regimen and diabetes treatment plan controlled

## 2025-01-07 ENCOUNTER — OFFICE VISIT (OUTPATIENT)
Dept: FAMILY MEDICINE | Facility: CLINIC | Age: 80
End: 2025-01-07
Payer: COMMERCIAL

## 2025-01-07 VITALS
HEIGHT: 62 IN | BODY MASS INDEX: 26.68 KG/M2 | DIASTOLIC BLOOD PRESSURE: 83 MMHG | HEART RATE: 82 BPM | OXYGEN SATURATION: 100 % | RESPIRATION RATE: 14 BRPM | SYSTOLIC BLOOD PRESSURE: 169 MMHG | TEMPERATURE: 97.8 F | WEIGHT: 145 LBS

## 2025-01-07 DIAGNOSIS — E78.2 MIXED HYPERLIPIDEMIA: ICD-10-CM

## 2025-01-07 DIAGNOSIS — I48.0 PAROXYSMAL ATRIAL FIBRILLATION (H): ICD-10-CM

## 2025-01-07 DIAGNOSIS — E11.3299 NONPROLIFERATIVE DIABETIC RETINOPATHY (H): ICD-10-CM

## 2025-01-07 DIAGNOSIS — I50.9 CHRONIC CONGESTIVE HEART FAILURE, UNSPECIFIED HEART FAILURE TYPE (H): ICD-10-CM

## 2025-01-07 DIAGNOSIS — I10 PRIMARY HYPERTENSION: ICD-10-CM

## 2025-01-07 DIAGNOSIS — N18.32 STAGE 3B CHRONIC KIDNEY DISEASE (H): Primary | ICD-10-CM

## 2025-01-07 DIAGNOSIS — Z00.00 HEALTH CARE MAINTENANCE: ICD-10-CM

## 2025-01-07 PROCEDURE — 99214 OFFICE O/P EST MOD 30 MIN: CPT | Performed by: INTERNAL MEDICINE

## 2025-01-07 RX ORDER — EZETIMIBE 10 MG/1
10 TABLET ORAL DAILY
Qty: 90 TABLET | Refills: 3 | Status: SHIPPED | OUTPATIENT
Start: 2025-01-07

## 2025-01-07 RX ORDER — METOPROLOL SUCCINATE 50 MG/1
50 TABLET, EXTENDED RELEASE ORAL DAILY
Qty: 90 TABLET | Refills: 3 | Status: SHIPPED | OUTPATIENT
Start: 2025-01-07

## 2025-01-07 RX ORDER — CLOPIDOGREL BISULFATE 75 MG/1
75 TABLET ORAL EVERY MORNING
Qty: 90 TABLET | Refills: 3 | Status: SHIPPED | OUTPATIENT
Start: 2025-01-07

## 2025-01-07 RX ORDER — AMLODIPINE BESYLATE 5 MG/1
5 TABLET ORAL DAILY
Qty: 90 TABLET | Refills: 3 | Status: SHIPPED | OUTPATIENT
Start: 2025-01-07

## 2025-01-07 RX ORDER — ROSUVASTATIN CALCIUM 5 MG/1
5 TABLET, COATED ORAL DAILY
Qty: 90 TABLET | Refills: 3 | Status: SHIPPED | OUTPATIENT
Start: 2025-01-07 | End: 2025-01-08

## 2025-01-07 NOTE — ASSESSMENT & PLAN NOTE
HTN  uncontrolled  current antihypertensive regimen: chlorthalidone 25mg daily, carvedilol 12.5mg BID (has not been taking)  regimen changes: replace carvedilol with Toprol XL 50mg daily, add amlodipine 5mg daily  Intolerance: ACEi/ARB - angioedema hx, high dose amlodipine (edema)  future titration/work-up plan:   Prior to CABG, Previously maintained on atenolol/chlorthalidone 50/25mg, amlodipine 5mg   - poor future candidate for atenolol given CKD    Perioperative atrial fibrillation after bypass  -No further A-fib burden; okayed by cardiology to stop warfarin    CAD; 4VCABG  2/2023: NSTEMI presentation, admitted to [facility] -undergoing subsequent CABG   - Post off pump coronary artery bypass grafting x 4: LIMA to LAD, sequential saphenous vein graft to diagonal and OM, saphenous vein graft to PDA, endoscopic vein harvest of left lower extremity, left atrial appendage occlusion with 45 mm Atriclip, direct current cardioversion on 12/19/2023   - on carvedilol BID  - ASA + clopidogrel (recs for DAPT for 1 yr)   - h/o statin intolerance (concerns with LFTs elevation as well)   - 2/2024: started on ezetimibe   - cardiology exploring PCSK9 inhibitor   - 11/2024: Trial on rosuvastatin 5 mg weekly -encouraged to titrate up frequency of use as tolerated.

## 2025-01-07 NOTE — PROGRESS NOTES
Assessment & Plan   Problem List Items Addressed This Visit          Endocrine    Mixed hyperlipidemia    Relevant Medications    rosuvastatin (CRESTOR) 5 MG tablet    ezetimibe (ZETIA) 10 MG tablet    Nonproliferative diabetic retinopathy (H)       Circulatory     HTN  uncontrolled  current antihypertensive regimen: chlorthalidone 25mg daily, carvedilol 12.5mg BID (has not been taking)  regimen changes: replace carvedilol with Toprol XL 50mg daily, add amlodipine 5mg daily  Intolerance: ACEi/ARB - angioedema hx, high dose amlodipine (edema)  future titration/work-up plan:   Prior to CABG, Previously maintained on atenolol/chlorthalidone 50/25mg, amlodipine 5mg   - poor future candidate for atenolol given CKD    Perioperative atrial fibrillation after bypass  -No further A-fib burden; okayed by cardiology to stop warfarin    CAD; 4VCABG  2/2023: NSTEMI presentation, admitted to [facility] -undergoing subsequent CABG   - Post off pump coronary artery bypass grafting x 4: LIMA to LAD, sequential saphenous vein graft to diagonal and OM, saphenous vein graft to PDA, endoscopic vein harvest of left lower extremity, left atrial appendage occlusion with 45 mm Atriclip, direct current cardioversion on 12/19/2023   - on carvedilol BID  - ASA + clopidogrel (recs for DAPT for 1 yr)   - h/o statin intolerance (concerns with LFTs elevation as well)   - 2/2024: started on ezetimibe   - cardiology exploring PCSK9 inhibitor   - 11/2024: Trial on rosuvastatin 5 mg weekly -encouraged to titrate up frequency of use as tolerated.         Primary hypertension    Relevant Medications    metoprolol succinate ER (TOPROL XL) 50 MG 24 hr tablet    amLODIPine (NORVASC) 5 MG tablet    Paroxysmal atrial fibrillation (H)    Relevant Medications    clopidogrel (PLAVIX) 75 MG tablet    CHF (congestive heart failure) (H)       Urinary    Stage 3b chronic kidney disease (H) - Primary     Baseline SCr 1.4-1.7  - post CABG rise in Scr to 2.8  "(stabilized ~2-2.2)  - not maintained on ACEi/ARB (prior angioedema hx)            Other    Health care maintenance    Relevant Orders    REVIEW OF HEALTH MAINTENANCE PROTOCOL ORDERS (Completed)             BMI  Estimated body mass index is 26.52 kg/m  as calculated from the following:    Height as of this encounter: 1.575 m (5' 2\").    Weight as of this encounter: 65.8 kg (145 lb).       FUTURE APPOINTMENTS:       - Follow-up visit in 1 month      Renae Topete is a 79 year old, presenting for the following health issues: Returns for follow-up specifically related to blood pressure concerns.  At home and in-clinic blood pressures have been significantly elevated.  Currently taking chlorthalidone.  She had been instructed to add her chlorthalidone to carvedilol, though she interpreted this as replacing the previous medication.  She stated that carvedilol did cause significant chest congestion and cough -resolved after cessation of the medication.  Hypertension (200/71 at home today)        1/7/2025    10:40 AM   Additional Questions   Roomed by Nilam FLOWERS     History of Present Illness       Reason for visit:  HTN  Symptom onset:  More than a month  Symptom intensity:  Severe  Symptom progression:  Worsening    She eats 0-1 servings of fruits and vegetables daily.She consumes 0 sweetened beverage(s) daily.She exercises with enough effort to increase her heart rate 9 or less minutes per day.  She exercises with enough effort to increase her heart rate 3 or less days per week.   She is taking medications regularly.         Review of Systems  Constitutional, HEENT, cardiovascular, pulmonary, gi and gu systems are negative, except as otherwise noted.      Objective    BP (!) 192/81   Pulse 82   Temp 97.8  F (36.6  C)   Resp 14   Ht 1.575 m (5' 2\")   Wt 65.8 kg (145 lb)   LMP  (LMP Unknown)   SpO2 100%   BMI 26.52 kg/m    Body mass index is 26.52 kg/m .  Physical Exam   GENERAL: alert and no " distress  NECK: no adenopathy, no asymmetry, masses, or scars  RESP: lungs clear to auscultation - no rales, rhonchi or wheezes  CV: regular rate and rhythm, normal S1 S2, no S3 or S4, no murmur, click or rub, no peripheral edema  ABDOMEN: soft, nontender, no hepatosplenomegaly, no masses and bowel sounds normal  MS: no gross musculoskeletal defects noted, no edema    Office Visit on 10/07/2024   Component Date Value Ref Range Status    METHODOLOGY 10/07/2024    Final                    Value:The regions of genomic DNA containing the F5 gene mutation R506Q(1691G>A)                           and the Factor 2 (Prothrombin [id number]) gene mutation were simultaneously                           amplified using the polymerase chain reaction. The amplified products were                           digested with restriction endonuclease TaqI and products were analyzed by                           gel electrophoresis.                               RESULTS 10/07/2024    Final                    Value:                          Factor V 1691G>A (Leiden)  RESULTS:                          Mutation analyzed: 1691G>A                          Factor V 1691G>A (Leiden)  Interpretation:  PRESENT                          Factor V 1691G>A (Leiden) mutation  genotype:  G/A                              INTERPRETATION 10/07/2024    Final                    Value:The patient is a heterozygote (one copy of the gene positive) for the F5                           gene mutation R506Q (1691G>A) mutation. The presence of the F5 gene                           mutation R506Q (1691G>A) mutation is an indication of an increased risk of                           developing a thrombosis. The extent of this risk is dependent upon several                           other known thrombophilic risk factors including smoking, obesity and the                           use of oral contraceptives. Consultation regarding these results is                            available upon request. Genetic counseling regarding these results is                           indicated in the evaluation of other family members.                          (Electronically signed by: Orlando Senior MD October 10, 2024 7:34                           PM)    COMMENTS 10/07/2024    Final                    Value:If a patient is the recipient of an allogeneic bone marrow transplant,                           this test must be done on a pre-transplant sample or buccal swab.  A                           previous allogeneic bone marrow transplant will interfere with test                           results.  Call the [facility] ([phone]) for                           instructions on sample collection for these patients.    DISCLAIMER 10/07/2024    Final                    Value:This test was developed and its performance characteristics determined by                           [facility]. It has not been                           cleared or approved by the FDA. The laboratory is regulated under CLIA as                           qualified to perform high-complexity testing. This test is used for                           clinical purposes. It should not be regarded as investigational or for                           research.                                                    A resident/fellow in an accredited training program was involved in the                           selection of testing, review of laboratory data, and/or interpretation of                           this case.  I, as the senior physician, attest that I: (i) confirmed                           appropriate testing, (ii) examined the relevant raw data for the                           specimen(s); and (iii) rendered or confirmed the interpretation(s).                              FACTOR V INTERPRETATION 10/07/2024  (A)   Final                    Value:Factor V 1691G>A (Leiden)   Interpretation:  PRESENT    Specimen Description 10/07/2024    Final                    Value:Blood: ACD    Sodium 10/07/2024 140  135 - 145 mmol/L Final    Potassium 10/07/2024 4.2  3.4 - 5.3 mmol/L Final    Chloride 10/07/2024 107  98 - 107 mmol/L Final    Carbon Dioxide (CO2) 10/07/2024 22  22 - 29 mmol/L Final    Anion Gap 10/07/2024 11  7 - 15 mmol/L Final    Urea Nitrogen 10/07/2024 23.6 (H)  8.0 - 23.0 mg/dL Final    Creatinine 10/07/2024 1.54 (H)  0.51 - 0.95 mg/dL Final    GFR Estimate 10/07/2024 34 (L)  >60 mL/min/1.73m2 Final    eGFR calculated using 2021 CKD-EPI equation.    Calcium 10/07/2024 9.5  8.8 - 10.4 mg/dL Final    Reference intervals for this test were updated on 7/16/2024 to reflect our healthy population more accurately. There may be differences in the flagging of prior results with similar values performed with this method. Those prior results can be interpreted in the context of the updated reference intervals.    Glucose 10/07/2024 117 (H)  70 - 99 mg/dL Final           Signed Electronically by: Chicho Luong MD

## 2025-01-08 RX ORDER — ROSUVASTATIN CALCIUM 5 MG/1
5 TABLET, COATED ORAL DAILY
Qty: 90 TABLET | Refills: 3 | Status: SHIPPED | OUTPATIENT
Start: 2025-01-08

## 2025-01-13 ENCOUNTER — TRANSFERRED RECORDS (OUTPATIENT)
Dept: HEALTH INFORMATION MANAGEMENT | Facility: CLINIC | Age: 80
End: 2025-01-13
Payer: MEDICARE

## 2025-01-13 ENCOUNTER — TELEPHONE (OUTPATIENT)
Dept: FAMILY MEDICINE | Facility: CLINIC | Age: 80
End: 2025-01-13
Payer: MEDICARE

## 2025-01-13 LAB — RETINOPATHY: POSITIVE

## 2025-01-13 NOTE — TELEPHONE ENCOUNTER
Symptoms    Describe your symptoms: feeling ill and dizzy and shaky, feeling sick to her stomach    Any pain: No    How long have you been having symptoms: 4 days    Have you been seen for this:  No    Appointment offered?: No    Triage offered?: Yes: transferred    Home remedies tried: n/a    Preferred Pharmacy:   [id number] - [place], [place] - [address] [facility] [address]  Phone: [phone] Fax: [phone]    [facility] [address] [place] [id number]  Phone: [phone] Fax: [phone]      Okay to leave a detailed message?: Yes at Home number on file [phone] (home) or Cell number on file:    Telephone Information:   Mobile [phone]

## 2025-01-13 NOTE — TELEPHONE ENCOUNTER
Patient refusing to discuss with another provider only wants Dr Luong.     RN able to encourage patient to come in and discuss side effects of what patient believes is from medication with Parminder. Patient cannot come in today d/t previous eye appt.    Appt made for tomorrow 8:20 AM    Patient calling to report symptoms of concern of medication side effects.    Last new medications  yesterday /77  P 81 this AM.    lynda Martinez RN  [facility]  [phone]    [facility]   Monday  - Thursday 7 AM - 6 PM    Friday  7 AM - 5 PM     -Please call your clinic for assistance from a nurse after hours.

## 2025-01-14 ENCOUNTER — OFFICE VISIT (OUTPATIENT)
Dept: FAMILY MEDICINE | Facility: CLINIC | Age: 80
End: 2025-01-14
Payer: COMMERCIAL

## 2025-01-14 VITALS
HEART RATE: 70 BPM | BODY MASS INDEX: 26.78 KG/M2 | RESPIRATION RATE: 16 BRPM | TEMPERATURE: 97.4 F | DIASTOLIC BLOOD PRESSURE: 82 MMHG | SYSTOLIC BLOOD PRESSURE: 134 MMHG | WEIGHT: 145.5 LBS | HEIGHT: 62 IN | OXYGEN SATURATION: 98 %

## 2025-01-14 DIAGNOSIS — R42 LIGHTHEADEDNESS: ICD-10-CM

## 2025-01-14 DIAGNOSIS — I10 PRIMARY HYPERTENSION: Primary | ICD-10-CM

## 2025-01-14 PROCEDURE — 99213 OFFICE O/P EST LOW 20 MIN: CPT | Performed by: PHYSICIAN ASSISTANT

## 2025-01-14 NOTE — PROGRESS NOTES
"  Assessment & Plan     (I10) Primary hypertension  (primary encounter diagnosis)  Comment: Well controlled at todays visit   Plan: Discontinue metoprolol until follow up appointment with Dr. Luong. Message sent to Dr. Luong inquiring if he would like her to try an additional medication prior to her upcoming appointment          BMI  Estimated body mass index is 26.61 kg/m  as calculated from the following:    Height as of this encounter: 1.575 m (5' 2\").    Weight as of this encounter: 66 kg (145 lb 8 oz).             Renae Topete is a 79 year old, presenting for the following health issues:  Recheck Medication (Recently started new blood pressure medication, having dizziness, nausea, body shakes, HA since last Friday 1/10/25, possible side effect to medication )        1/14/2025     8:26 AM   Additional Questions   Roomed by THERESA Kee     Patient presents today complaining of new onset dizziness, HA, and nausea that she believes to be associated with recently starting new anti-hypertensive medication on 01/07/25.   She saw Dr. Luong last Tuesday and they started her on amlodipine and metoprolol due to her uncontrolled HTN.   She began experiencing symptoms on Saturday.   She discontinued use of metoprolol on Sunday and reports that her symptoms improved  She restarted the metoprolol yesterday and states that her symptoms returned once more. She denies taking it this morning  Patient denies having any fever, vomiting, syncope, chest pain, shortness of breath, or significant LE edema.  She denies having any falls  Patient has a follow up with Dr. Luong on 01/28/25    Scribed by WILLIE Banda    History of Present Illness       Reason for visit:  HTN   She is taking medications regularly.                     Objective    /82 (BP Location: Right arm, Patient Position: Sitting, Cuff Size: Adult Regular)   Pulse 70   Temp 97.4  F (36.3  C) (Tympanic)   Resp 16   Ht 1.575 m (5' 2\")   Wt 66 " kg (145 lb 8 oz)   LMP  (LMP Unknown)   SpO2 98%   BMI 26.61 kg/m    Body mass index is 26.61 kg/m .  Physical Exam  Constitutional:       Appearance: Normal appearance. She is normal weight.   HENT:      Head: Normocephalic and atraumatic.   Cardiovascular:      Rate and Rhythm: Normal rate and regular rhythm.      Pulses: Normal pulses.      Heart sounds: Normal heart sounds.   Pulmonary:      Effort: Pulmonary effort is normal.      Breath sounds: Normal breath sounds.   Skin:     General: Skin is warm and dry.   Neurological:      General: No focal deficit present.      Mental Status: She is alert and oriented to person, place, and time.   Psychiatric:         Mood and Affect: Mood normal.                  Note composed by WILLIE Banda. History and exam performed and confirmed by me. Agree with assessment and plan.    Signed Electronically by: MARGOTH Bennett

## 2025-02-18 ENCOUNTER — OFFICE VISIT (OUTPATIENT)
Dept: FAMILY MEDICINE | Facility: CLINIC | Age: 80
End: 2025-02-18
Payer: MEDICARE

## 2025-02-18 VITALS
SYSTOLIC BLOOD PRESSURE: 154 MMHG | WEIGHT: 145 LBS | TEMPERATURE: 97.8 F | BODY MASS INDEX: 26.68 KG/M2 | HEIGHT: 62 IN | HEART RATE: 69 BPM | OXYGEN SATURATION: 100 % | RESPIRATION RATE: 20 BRPM | DIASTOLIC BLOOD PRESSURE: 78 MMHG

## 2025-02-18 DIAGNOSIS — I10 PRIMARY HYPERTENSION: ICD-10-CM

## 2025-02-18 DIAGNOSIS — I25.10 ATHEROSCLEROSIS OF NATIVE CORONARY ARTERY OF NATIVE HEART WITHOUT ANGINA PECTORIS: ICD-10-CM

## 2025-02-18 DIAGNOSIS — I50.9 CHRONIC CONGESTIVE HEART FAILURE, UNSPECIFIED HEART FAILURE TYPE (H): ICD-10-CM

## 2025-02-18 DIAGNOSIS — Z79.4 TYPE 2 DIABETES MELLITUS WITH BOTH EYES AFFECTED BY MILD NONPROLIFERATIVE RETINOPATHY WITHOUT MACULAR EDEMA, WITH LONG-TERM CURRENT USE OF INSULIN (H): ICD-10-CM

## 2025-02-18 DIAGNOSIS — E11.3299 NONPROLIFERATIVE DIABETIC RETINOPATHY (H): ICD-10-CM

## 2025-02-18 DIAGNOSIS — I48.0 PAROXYSMAL ATRIAL FIBRILLATION (H): ICD-10-CM

## 2025-02-18 DIAGNOSIS — E11.3293 TYPE 2 DIABETES MELLITUS WITH BOTH EYES AFFECTED BY MILD NONPROLIFERATIVE RETINOPATHY WITHOUT MACULAR EDEMA, WITH LONG-TERM CURRENT USE OF INSULIN (H): ICD-10-CM

## 2025-02-18 DIAGNOSIS — N18.32 STAGE 3B CHRONIC KIDNEY DISEASE (H): Primary | ICD-10-CM

## 2025-02-18 LAB
CHOLEST SERPL-MCNC: 205 MG/DL
CREAT UR-MCNC: 74.2 MG/DL
EST. AVERAGE GLUCOSE BLD GHB EST-MCNC: 209 MG/DL
FASTING STATUS PATIENT QL REPORTED: YES
HBA1C MFR BLD: 8.9 % (ref 0–5.6)
HDLC SERPL-MCNC: 90 MG/DL
LDLC SERPL CALC-MCNC: 93 MG/DL
MICROALBUMIN UR-MCNC: 83.1 MG/L
MICROALBUMIN/CREAT UR: 111.99 MG/G CR (ref 0–25)
NONHDLC SERPL-MCNC: 115 MG/DL
TRIGL SERPL-MCNC: 109 MG/DL
TSH SERPL DL<=0.005 MIU/L-ACNC: 2.48 UIU/ML (ref 0.3–4.2)

## 2025-02-18 PROCEDURE — 99214 OFFICE O/P EST MOD 30 MIN: CPT | Performed by: INTERNAL MEDICINE

## 2025-02-18 PROCEDURE — 82043 UR ALBUMIN QUANTITATIVE: CPT | Performed by: INTERNAL MEDICINE

## 2025-02-18 PROCEDURE — 83036 HEMOGLOBIN GLYCOSYLATED A1C: CPT | Performed by: INTERNAL MEDICINE

## 2025-02-18 PROCEDURE — 36415 COLL VENOUS BLD VENIPUNCTURE: CPT | Performed by: INTERNAL MEDICINE

## 2025-02-18 PROCEDURE — 80061 LIPID PANEL: CPT | Performed by: INTERNAL MEDICINE

## 2025-02-18 PROCEDURE — 84443 ASSAY THYROID STIM HORMONE: CPT | Performed by: INTERNAL MEDICINE

## 2025-02-18 PROCEDURE — 82570 ASSAY OF URINE CREATININE: CPT | Performed by: INTERNAL MEDICINE

## 2025-02-18 RX ORDER — CLOPIDOGREL BISULFATE 75 MG/1
75 TABLET ORAL EVERY MORNING
Status: SHIPPED
Start: 2025-02-18

## 2025-02-18 NOTE — LETTER
February 19, 2025      Yoselyn Doyle   [address]  [facility] [address]        Dear ,    We are writing to inform you of your test results.    A1c up a bit from baseline at 8.9%.  Mild degree of protein levels in your urine which we will continue to monitor.    Resulted Orders   Albumin Random Urine Quantitative with Creat Ratio   Result Value Ref Range    Creatinine Urine mg/dL 74.2 mg/dL      Comment:      The reference ranges have not been established in urine creatinine. The results should be integrated into the clinical context for interpretation.    Albumin Urine mg/L 83.1 mg/L      Comment:      The reference ranges have not been established in urine albumin. The results should be integrated into the clinical context for interpretation.    Albumin Urine mg/g Cr 111.99 (H) 0.00 - 25.00 mg/g Cr      Comment:      Microalbuminuria is defined as an albumin:creatinine ratio of 17 to 299 for males and 25 to 299 for females. A ratio of albumin:creatinine of 300 or higher is indicative of overt proteinuria.  Due to biologic variability, positive results should be confirmed by a second, first-morning random or 24-hour timed urine specimen. If there is discrepancy, a third specimen is recommended. When 2 out of 3 results are in the microalbuminuria range, this is evidence for incipient nephropathy and warrants increased efforts at glucose control, blood pressure control, and institution of therapy with an angiotensin-converting-enzyme (ACE) inhibitor (if the patient can tolerate it).     HEMOGLOBIN A1C   Result Value Ref Range    Estimated Average Glucose 209 (H) <117 mg/dL    Hemoglobin A1C 8.9 (H) 0.0 - 5.6 %      Comment:      Normal <5.7%   Prediabetes 5.7-6.4%    Diabetes 6.5% or higher     Note: Adopted from ADA consensus guidelines.    Narrative    Results confirmed by repeat test.     TSH WITH FREE T4 REFLEX   Result Value Ref Range    TSH 2.48 0.30 - 4.20 uIU/mL   Lipid panel reflex to direct LDL  Non-fasting   Result Value Ref Range    Cholesterol 205 (H) <200 mg/dL    Triglycerides 109 <150 mg/dL    Direct Measure HDL 90 >=50 mg/dL    LDL Cholesterol Calculated 93 <100 mg/dL    Non HDL Cholesterol 115 <130 mg/dL    Patient Fasting > 8hrs? Yes     Narrative    Cholesterol  Desirable: < 200 mg/dL  Borderline High: 200 - 239 mg/dL  High: >= 240 mg/dL    Triglycerides  Normal: < 150 mg/dL  Borderline High: 150 - 199 mg/dL  High: 200-499 mg/dL  Very High: >= 500 mg/dL    Direct Measure HDL  Female: >= 50 mg/dL   Male: >= 40 mg/dL    LDL Cholesterol  Desirable: < 100 mg/dL  Above Desirable: 100 - 129 mg/dL   Borderline High: 130 - 159 mg/dL   High:  160 - 189 mg/dL   Very High: >= 190 mg/dL    Non HDL Cholesterol  Desirable: < 130 mg/dL  Above Desirable: 130 - 159 mg/dL  Borderline High: 160 - 189 mg/dL  High: 190 - 219 mg/dL  Very High: >= 220 mg/dL       If you have any questions or concerns, please call the clinic at the number listed above.       Sincerely,      Chicho Luong MD    Electronically signed

## 2025-02-18 NOTE — PROGRESS NOTES
Assessment & Plan   Problem List Items Addressed This Visit          Endocrine    Type 2 diabetes mellitus with both eyes affected by mild nonproliferative retinopathy without macular edema, with long-term current use of insulin (H)    Relevant Orders    HEMOGLOBIN A1C (Completed)    Nonproliferative diabetic retinopathy (H)    Relevant Orders    HEMOGLOBIN A1C (Completed)    TSH WITH FREE T4 REFLEX (Completed)       Circulatory     HTN, uncontrolled (labile home and in-clinic readings)  current antihypertensive regimen: chlorthalidone 25mg daily, amlodipine 5mg daily  regimen changes: none  Intolerance: ACEi/ARB - angioedema hx, high dose amlodipine (edema), carvedilol/metoprolol (nausea/dizziness)  future titration/work-up plan:   Prior to CABG, Previously maintained on atenolol/chlorthalidone 50/25mg, amlodipine 5mg   - poor future candidate for atenolol given CKD  -Pragmatically, I think achieving as feasible blood pressure control with her multiple class intolerances    Perioperative atrial fibrillation after bypass  -No further A-fib burden; okayed by cardiology to stop warfarin    CAD; 4VCABG  2/2023: NSTEMI presentation, admitted to [facility] -undergoing subsequent CABG   - Post off pump coronary artery bypass grafting x 4: LIMA to LAD, sequential saphenous vein graft to diagonal and OM, saphenous vein graft to PDA, endoscopic vein harvest of left lower extremity, left atrial appendage occlusion with 45 mm Atriclip, direct current cardioversion on 12/19/2023   - ASA + clopidogrel (recs for DAPT for 1 yr)   - h/o statin intolerance (concerns with LFTs elevation as well)   - 2/2024: started on ezetimibe   - cardiology exploring PCSK9 inhibitor   - 11/2024: on rosuvastatin 5 mg 2x weekly -encouraged to titrate up frequency of use as tolerated.         Atherosclerosis of native coronary artery of native heart without angina pectoris    Relevant Orders    Lipid panel reflex to direct LDL Non-fasting  "(Completed)    Primary hypertension    Paroxysmal atrial fibrillation (H)    Relevant Medications    clopidogrel (PLAVIX) 75 MG tablet    CHF (congestive heart failure) (H)       Urinary    Stage 3b chronic kidney disease (H) - Primary     Baseline SCr 1.4-1.7  - post CABG rise in Scr to 2.8 (stabilized ~2-2.2)  - not maintained on ACEi/ARB (prior angioedema hx)         Relevant Orders    Albumin Random Urine Quantitative with Creat Ratio (Completed)             BMI  Estimated body mass index is 26.52 kg/m  as calculated from the following:    Height as of this encounter: 1.575 m (5' 2\").    Weight as of this encounter: 65.8 kg (145 lb).         Renae Topete is a 79 year old, presenting for the following health issues: Returns for blood pressure control.  Attempted to start on metoprolol though describes feeling nauseated, lightheaded -had tried starting the medicine on multiple occasions.  Each time with similar symptoms which abated with cessation of medication.  Has continued to take amlodipine in addition to her chlorthalidone.  Brings in home blood pressures for review.  Generally demonstrates significant lability with systolic blood pressures ranging from 160s down to 120s; generally she feels that blood pressures have improved since amlodipine introduction.  Denies any anginal complaints.  No issues with lower extremity swelling.  Hypertension        2/18/2025    10:42 AM   Additional Questions   Roomed by Nilam FLOWERS     History of Present Illness       Diabetes:   She presents for follow up of diabetes.  She is checking home blood glucose four or more times daily.   She checks blood glucose before and after meals and at bedtime.  Blood glucose is sometimes over 200 and sometimes under 70. She is aware of hypoglycemia symptoms including shakiness and blurred vision.    She has no concerns regarding her diabetes at this time.   She is not experiencing numbness or burning in feet, excessive thirst, " blurry vision, weight changes or redness, sores or blisters on feet.           Heart Failure:  She presents for follow up of heart failure. She is not experiencing shortness of breath at night, with rest or with activity  She is experiencing lower extremity edema which is same as usual.   She denies orthopenea and is not coughing at night. Patient is not checking weight daily. She has recently had a None.  She has no side effects from medications.  She has had no other medical visits for heart failure since the last visit.    Hyperlipidemia:  She presents for follow up of hyperlipidemia.   She is taking medication to lower cholesterol. She is having myalgia or other side effects to statin medications.    Hypertension: She presents for follow up of hypertension.  She does check blood pressure  regularly outside of the clinic. Outside blood pressures have been over 140/90. She does not follow a low salt diet.     Vascular Disease:  She presents for follow up of vascular disease.      She takes daily aspirin.    Reason for visit:  Check up    She eats 0-1 servings of fruits and vegetables daily.She consumes 1 sweetened beverage(s) daily.She exercises with enough effort to increase her heart rate 10 to 19 minutes per day.  She exercises with enough effort to increase her heart rate 3 or less days per week. She is missing 1 dose(s) of medications per week.     Last Echo:   Echo result w/o MOPS: Interpretation Summary Technically difficult, suboptimal study. IV echo contrast not used at patientrequest. 1. The left ventricle is not well visualized. Left ventricular function isdecreased. The ejection fraction is 50-55% (borderline). There is mildconcentric left ventricular hypertrophy. Left ventricular diastolic functionis abnormal. Grade III or advanced diastolic dysfunction. There is mild globalhypokinesia of the left ventricle.2. The right ventricle is not well visualized. Normal right ventricle size andsystolic  "function.3. The left atrium is moderate to severely dilated.4. No hemodynamically significant valvular abnormalities on 2D or color flowimaging. There is no comparison study available.            Review of Systems  Constitutional, HEENT, cardiovascular, pulmonary, gi and gu systems are negative, except as otherwise noted.      Objective    BP (!) 174/78   Pulse 69   Temp 97.8  F (36.6  C)   Resp 20   Ht 1.575 m (5' 2\")   Wt 65.8 kg (145 lb)   LMP  (LMP Unknown)   SpO2 100%   BMI 26.52 kg/m    Body mass index is 26.52 kg/m .  Physical Exam   GENERAL: alert and no distress  NECK: no adenopathy, no asymmetry, masses, or scars  RESP: lungs clear to auscultation - no rales, rhonchi or wheezes  CV: regular rate and rhythm, normal S1 S2, no S3 or S4, no murmur, click or rub, no peripheral edema  ABDOMEN: soft, nontender, no hepatosplenomegaly, no masses and bowel sounds normal  MS: no gross musculoskeletal defects noted, no edema            Signed Electronically by: Chicho Luong MD    "

## 2025-02-18 NOTE — ASSESSMENT & PLAN NOTE
HTN, uncontrolled (labile home and in-clinic readings)  current antihypertensive regimen: chlorthalidone 25mg daily, amlodipine 5mg daily  regimen changes: none  Intolerance: ACEi/ARB - angioedema hx, high dose amlodipine (edema), carvedilol/metoprolol (nausea/dizziness)  future titration/work-up plan:   Prior to CABG, Previously maintained on atenolol/chlorthalidone 50/25mg, amlodipine 5mg   - poor future candidate for atenolol given CKD  -Pragmatically, I think achieving as feasible blood pressure control with her multiple class intolerances    Perioperative atrial fibrillation after bypass  -No further A-fib burden; okayed by cardiology to stop warfarin    CAD; 4VCABG  2/2023: NSTEMI presentation, admitted to [facility] -undergoing subsequent CABG   - Post off pump coronary artery bypass grafting x 4: LIMA to LAD, sequential saphenous vein graft to diagonal and OM, saphenous vein graft to PDA, endoscopic vein harvest of left lower extremity, left atrial appendage occlusion with 45 mm Atriclip, direct current cardioversion on 12/19/2023   - ASA + clopidogrel (recs for DAPT for 1 yr)   - h/o statin intolerance (concerns with LFTs elevation as well)   - 2/2024: started on ezetimibe   - cardiology exploring PCSK9 inhibitor   - 11/2024: on rosuvastatin 5 mg 2x weekly -encouraged to titrate up frequency of use as tolerated.

## 2025-02-25 ENCOUNTER — OFFICE VISIT (OUTPATIENT)
Dept: CARDIOLOGY | Facility: CLINIC | Age: 80
End: 2025-02-25
Attending: INTERNAL MEDICINE
Payer: COMMERCIAL

## 2025-02-25 VITALS
WEIGHT: 147 LBS | DIASTOLIC BLOOD PRESSURE: 60 MMHG | SYSTOLIC BLOOD PRESSURE: 130 MMHG | HEART RATE: 69 BPM | BODY MASS INDEX: 26.89 KG/M2 | RESPIRATION RATE: 16 BRPM

## 2025-02-25 DIAGNOSIS — N18.32 STAGE 3B CHRONIC KIDNEY DISEASE (H): ICD-10-CM

## 2025-02-25 DIAGNOSIS — Z95.1 S/P CABG (CORONARY ARTERY BYPASS GRAFT): ICD-10-CM

## 2025-02-25 DIAGNOSIS — T46.6X5A MYALGIA DUE TO STATIN: ICD-10-CM

## 2025-02-25 DIAGNOSIS — M79.10 MYALGIA DUE TO STATIN: ICD-10-CM

## 2025-02-25 DIAGNOSIS — I25.10 CORONARY ARTERY DISEASE INVOLVING NATIVE CORONARY ARTERY OF NATIVE HEART WITHOUT ANGINA PECTORIS: Primary | ICD-10-CM

## 2025-02-25 DIAGNOSIS — E78.2 MIXED HYPERLIPIDEMIA: ICD-10-CM

## 2025-02-25 DIAGNOSIS — E78.5 HYPERLIPIDEMIA LDL GOAL <70: ICD-10-CM

## 2025-02-25 PROCEDURE — G2211 COMPLEX E/M VISIT ADD ON: HCPCS | Performed by: INTERNAL MEDICINE

## 2025-02-25 PROCEDURE — 99214 OFFICE O/P EST MOD 30 MIN: CPT | Performed by: INTERNAL MEDICINE

## 2025-02-25 NOTE — PATIENT INSTRUCTIONS
Please contact direct nurses line Monday through Friday 8 AM to 5 PM @ [phone]    After-hours contact cardiology office at [phone].    Plan:    Stop clopidogrel (completed 12 months)   Continue other medications.

## 2025-02-25 NOTE — LETTER
2/25/2025    Chicho Luong MD  [address] [facility] [address]    RE: Yoselyn Doyle       Dear Colleague,     I had the pleasure of seeing Yoseyln Doyle in the [facility].    HEART CARE ENCOUNTER CONSULTATON NOTE      [facility]  [phone]      Assessment/Recommendations   Assessment:   Coronary artery disease status post coronary artery bypass surgery December 2023 ([facility]).  Acute ACS during 2023 hospitalization (completed 12 months of DAP).    2.  Postoperative atrial fibrillation (resolved).    3.  Hyperlipidemia with statin intolerance (patient has tried rosuvastatin pravastatin, atorvastatin).  All have resulted in severe myalgias.  Tolerating low dose Crestor 2-3 time per week.    5.  Diabetes mellitus type 2, insulin-dependent, retinopathy, chronic kidney disease Stage IIIb.    6.  Chronic kidney disease stage IIIb  7.  Ischemic cardiomyopathy, ejection fraction 40%, improved to 50-55% with revascularization.  Well compenstated.       Plan:   1.  Continue carvedilol   2.  Amlodipine 5 mg daily   3.  Continue Zetia and Crestor. Again discussed for Repatha 140 mg Q14 daily subcutaneous or Praluent 75 mg BID SQ. Patient want to maintain on current medications.    4.  Discontinue Plavix for 12 months.  Aspirin 81 mg daily   5.  Discuss ongoing walking plan    Plan to      History of Present Illness/Subjective    HPI: Yoselyn Doyle is a 79 year old female with coronary artery disease status post coronary bypass surgery, NSTEMI in December 2023 prior to revascularization via CABG, ischemic cardiomyopathy with mild reduction left ventricular ejection fraction (HFimEF), chronic kidney disease stage IIIb who presents to cardiology clinic in follow-up.    Patient was hospitalized at [facility] in October 2024 for hypertensive urgency emergency Associates mild elevation in troponin level.  She was initially transferred to [facility].  There they underwent  further evaluation including adjustment of medication.  No revascularization was performed due to stable echocardiogram findings.    Currently she is doing well on current medications.  Blood pressure well-controlled on current regimen.  She is increased her walking last few days noted stabilization of her blood pressure.  We discussed low-sodium diet.  Renal function remains stable with a creatinine at 1.5.  She is followed close by Dr. Luong.    Reviewed echocardiogram results with patient in detail.  This was performed at [facility].    Echo: 10/2/2024 (reviewed results from [facility]).   Final Impressions:    1. Normal LV size, normal wall thickness, low normal global systolic function with an estimated EF of 50 - 55%.    2. Right ventricular cavity size is mildly enlarged, global systolic RV function is normal.    3. Basal inferior segment is abnormal.    4. The aortic valve is trileaflet and sclerotic, no stenosis and no regurgitation.    5. The mitral valve is normal, mild mitral regurgitation.    6. Echo contrast was administered to enhance visualization of all left ventricular segments.     ECHO: 12/22/2023  Final Conclusion Previous Study: 12/16/2023    Visually Estimated EF: 45-50%    Technically difficult study - contrast was used to enhance endocardial definition due to   suboptimal image quality.    Mildly abnormal left ventricular ejection fraction estimated at 45-50%.    Inferior and inferior septal wall motion abnormality.      Physical Examination  Review of Systems   Vitals: /60 (BP Location: Right arm, Patient Position: Sitting, Cuff Size: Adult Regular)   Pulse 69   Resp 16   Wt 66.7 kg (147 lb)   LMP  (LMP Unknown)   BMI 26.89 kg/m    BMI= Body mass index is 26.89 kg/m .  Wt Readings from Last 3 Encounters:   02/25/25 66.7 kg (147 lb)   02/18/25 65.8 kg (145 lb)   01/14/25 66 kg (145 lb 8 oz)        Pleasant   ENT/Mouth: membranes moist, no oral lesions or bleeding gums.       EYES:  no scleral icterus, normal conjunctivae       Chest/Lungs:   lungs are clear to auscultation, no rales   Cardiovascular:   Regular. Normal first and second heart sounds with no murmurs, rubs, or gallops; the carotid, radial and posterior tibial pulses are intact, Jugular venous pressure noraml, no edema bilaterally        Extremities: no cyanosis or clubbing   Skin: no xanthelasma, warm.    Neurologic: normal  bilateral, no tremors     Psychiatric: alert and oriented x3, calm        Please refer above for cardiac ROS details.        Medical History  Surgical History Family History Social History   Past Medical History:   Diagnosis Date     Coronary atherosclerosis of unspecified type of vessel, native or graft     Coronary artery disease     Hearing aid worn      Infection due to 2019 novel coronavirus 08/29/2023     Open-angle glaucoma, unspecified(365.10)      Type II or unspecified type diabetes mellitus without mention of complication, not stated as uncontrolled     Diabetes mellitus          [place] passed 8/2024     Past Surgical History:   Procedure Laterality Date     AS CABG, ARTERY-VEIN, FOUR  12/18/2023     HC DILATION/CURETTAGE DIAG/THER NON OB  [age]    D & C     HC REMOVAL GALLBLADDER  01/01/1968     HC REMOVAL OF TONSILS,<10 Y/O       HC REMV CATARACT EXTRACAP,INSERT LENS, W/O ECP  12/20/2006    right eye     HC REMV CATARACT EXTRACAP,INSERT LENS, W/O ECP  07/19/2013    left eye     ZZC APPENDECTOMY  01/01/1960     Z GLAUCOMA SURG,TRABECU AB EXTERNO  03/28/2006    right eye     ZZC TOTAL ABDOM HYSTERECTOMY  01/01/1975     Family History   Problem Relation Age of Onset     Diabetes Mother         diabetes        Social History     Socioeconomic History     Marital status:      Spouse name: Not on file     Number of children: Not on file     Years of education: Not on file     Highest education level: Not on file   Occupational History     Not on file   Tobacco Use     Smoking  status: Former     Types: Cigarettes     Passive exposure: Never     Smokeless tobacco: Never     Tobacco comments:     12/00 aprox 30 pack years   Vaping Use     Vaping status: Never Used   Substance and Sexual Activity     Alcohol use: No     Drug use: No     Sexual activity: Yes     Comment: monogomous with  39 years   Other Topics Concern      Service Not Asked     Blood Transfusions No     Comment: hep c neg     Caffeine Concern Not Asked     Occupational Exposure Not Asked     Hobby Hazards Not Asked     Sleep Concern Not Asked     Stress Concern Not Asked     Weight Concern Not Asked     Special Diet Not Asked     Back Care Not Asked     Exercise Not Asked     Bike Helmet Not Asked     Seat Belt Not Asked     Self-Exams Not Asked   Social History Narrative     Not on file     Social Drivers of Health     Financial Resource Strain: Low Risk  (2/23/2024)    Financial Resource Strain      Within the past 12 months, have you or your family members you live with been unable to get utilities (heat, electricity) when it was really needed?: No   Food Insecurity: No Food Insecurity (10/2/2024)    Received from [employer]    Food Insecurity      Do you worry your food will run out before you are able to buy more?: 1   Transportation Needs: No Transportation Needs (10/2/2024)    Received from [employer]    Transportation Needs      Does lack of transportation keep you from medical appointments?: 1      Does lack of transportation keep you from work, meetings or getting things that you need?: 1   Physical Activity: Not on file   Stress: No Stress Concern Present (2/23/2024)    [employer] - Occupational Stress Questionnaire      Feeling of Stress : Only a little   Social Connections: Socially Integrated (10/2/2024)    Received from [employer]    Social Connections      Do you  often feel lonely or isolated from those around you?: 0   Interpersonal Safety: Low Risk  (10/7/2024)    Interpersonal Safety      Do you feel physically and emotionally safe where you currently live?: Yes      Within the past 12 months, have you been hit, slapped, kicked or otherwise physically hurt by someone?: No      Within the past 12 months, have you been humiliated or emotionally abused in other ways by your partner or ex-partner?: No   Housing Stability: Low Risk  (10/2/2024)    Received from [email or web address] [email or web address]  & [facility]    Housing Stability      What is your housing situation today?: 1           Medications  Allergies   Current Outpatient Medications   Medication Sig Dispense Refill     amLODIPine (NORVASC) 5 MG tablet Take 1 tablet (5 mg) by mouth daily. 90 tablet 3     aspirin 81 MG EC tablet Take 2 tablets (162 mg) by mouth daily       chlorthalidone (HYGROTON) 25 MG tablet Take 1 tablet (25 mg) by mouth daily. 90 tablet 3     ezetimibe (ZETIA) 10 MG tablet Take 1 tablet (10 mg) by mouth daily. 90 tablet 3     insulin glargine U-300 (TOUJEO MAX SOLOSTAR) 300 UNIT/ML (2 units dial) pen INJECT 36 UNITS SUBCUTANEOUSLY ONCE DAILY (Patient taking differently: 20 Units.) 12 mL 3     NOVOLOG FLEXPEN 100 UNIT/ML soln Inject 15-20 Units Subcutaneous 3 times daily (with meals) INJECT 12 TO 15 UNITS SUBCUTANEOUSLY THREE TIMES DAILY BEFORE MEAL(S) 30 mL 3     pantoprazole (PROTONIX) 40 MG EC tablet Take 1 tablet by mouth once daily 90 tablet 2     rosuvastatin (CRESTOR) 5 MG tablet Take 1 tablet (5 mg) by mouth daily. 90 tablet 3       Allergies   Allergen Reactions     Rosiglitazone Maleate Shortness Of Breath     avandia- breathing trouble     Amlodipine      Diphenhydramine Swelling     Irbesartan-Hydrochlorothiazide      Other reaction(s): Angioedema     Lisinopril      Other reaction(s): Angioedema     Macrolides And Ketolides      breathing trouble (zithromax)     Metformin Other (See Comments)  "    Moxifloxacin Other (See Comments)     Nsaids      naprosyn-hives     Nuts Itching     Penicillins      hives     Pioglitazone Hydrochloride Other (See Comments) and Swelling     actos- gi upset, cough     Prednisone      hives     Sulfa Antibiotics      hives          Lab Results    Chemistry/lipid CBC Cardiac Enzymes/BNP/TSH/INR   Recent Labs   Lab Test 03/05/24  1137 10/21/22  0809 08/07/21  0910   CHOL 213*   < > 172   HDL 59   < > 60   *   < > 91   TRIG 152*   < > 118   CHOLHDLRATIO  --   --  2.9    < > = values in this interval not displayed.     Recent Labs   Lab Test 03/05/24  1137 10/21/22  0809 08/07/21  0910   * 85 91     Recent Labs   Lab Test 01/24/24  1333      POTASSIUM 4.1   CHLORIDE 97*   CO2 26   *   BUN 37.3*   CR 2.26*   GFRESTIMATED 21*   JACK 9.6     Recent Labs   Lab Test 01/24/24  1333 12/29/23  1144 06/15/23  0802   CR 2.26* 2.32* 1.63*     Recent Labs   Lab Test 03/05/24  1137 06/15/23  0802 10/21/22  0809   A1C 8.6* 7.9* 8.1*     Lab Results   Component Value Date    A1C 8.9 02/18/2025    A1C 7.8 08/06/2024    A1C 8.6 03/05/2024    A1C 7.9 06/15/2023    A1C 8.1 10/21/2022    A1C 9.5 08/07/2001    A1C 9.2 05/10/2001     Recent Labs   Lab Test 02/18/25  1142 03/05/24  1137 10/21/22  0809 08/07/21  0910 07/16/20  1005   CHOL 205* 213*   < > 172 161   HDL 90 59   < > 60 64   LDL 93 124*   < > 91 80   TRIG 109 152*   < > 118 83   CHOLHDLRATIO  --   --   --  2.9 2.5    < > = values in this interval not displayed.          Recent Labs   Lab Test 03/05/24  1137 01/24/24  1333   WBC  --  10.9   HGB 10.7* 10.5*   HCT  --  33.7*   MCV  --  90   PLT  --  283     Recent Labs   Lab Test 03/05/24  1137 01/24/24  1333 06/15/23  0802   HGB 10.7* 10.5* 12.0    No results for input(s): \"TROPONINI\" in the last 03720 hours.  No results for input(s): \"BNP\", \"NTBNPI\", \"NTBNP\" in the last 85963 hours.  No results for input(s): \"TSH\" in the last 00062 hours.  Recent Labs   Lab Test " 04/02/24  1312 03/19/24  1254 03/05/24  1137   INR 2.0* 2.2* 2.5*        Lucio Espinoza DO        The longitudinal plan of care for the diagnosis(es)/condition(s) as documented were addressed during this visit. Due to the added complexity in care, I will continue to support Yoselyn in the subsequent management and with ongoing continuity of care.                              Thank you for allowing me to participate in the care of your patient.      Sincerely,     Lucio Espinoza DO     [facility]  cc:   Lucio Espinoza DO  [address] [place] [address]

## 2025-02-25 NOTE — PROGRESS NOTES
HEART CARE ENCOUNTER CONSULTATON NOTE      [facility]  [phone]      Assessment/Recommendations   Assessment:   Coronary artery disease status post coronary artery bypass surgery December 2023 ([facility]).  Acute ACS during 2023 hospitalization (completed 12 months of DAP).    2.  Postoperative atrial fibrillation (resolved).    3.  Hyperlipidemia with statin intolerance (patient has tried rosuvastatin pravastatin, atorvastatin).  All have resulted in severe myalgias.  Tolerating low dose Crestor 2-3 time per week.    5.  Diabetes mellitus type 2, insulin-dependent, retinopathy, chronic kidney disease Stage IIIb.    6.  Chronic kidney disease stage IIIb  7.  Ischemic cardiomyopathy, ejection fraction 40%, improved to 50-55% with revascularization.  Well compenstated.       Plan:   1.  Continue carvedilol   2.  Amlodipine 5 mg daily   3.  Continue Zetia and Crestor. Again discussed for Repatha 140 mg Q14 daily subcutaneous or Praluent 75 mg BID SQ. Patient want to maintain on current medications.    4.  Discontinue Plavix for 12 months.  Aspirin 81 mg daily   5.  Discuss ongoing walking plan    Plan to      History of Present Illness/Subjective    HPI: Yoselyn Doyle is a 79 year old female with coronary artery disease status post coronary bypass surgery, NSTEMI in December 2023 prior to revascularization via CABG, ischemic cardiomyopathy with mild reduction left ventricular ejection fraction (HFimEF), chronic kidney disease stage IIIb who presents to cardiology clinic in follow-up.    Patient was hospitalized at [facility] in October 2024 for hypertensive urgency emergency Associates mild elevation in troponin level.  She was initially transferred to [facility].  There they underwent further evaluation including adjustment of medication.  No revascularization was performed due to stable echocardiogram findings.    Currently she is doing well on current medications.  Blood pressure  well-controlled on current regimen.  She is increased her walking last few days noted stabilization of her blood pressure.  We discussed low-sodium diet.  Renal function remains stable with a creatinine at 1.5.  She is followed close by Dr. Luong.    Reviewed echocardiogram results with patient in detail.  This was performed at [facility].    Echo: 10/2/2024 (reviewed results from [facility]).   Final Impressions:    1. Normal LV size, normal wall thickness, low normal global systolic function with an estimated EF of 50 - 55%.    2. Right ventricular cavity size is mildly enlarged, global systolic RV function is normal.    3. Basal inferior segment is abnormal.    4. The aortic valve is trileaflet and sclerotic, no stenosis and no regurgitation.    5. The mitral valve is normal, mild mitral regurgitation.    6. Echo contrast was administered to enhance visualization of all left ventricular segments.     ECHO: 12/22/2023  Final Conclusion Previous Study: 12/16/2023    Visually Estimated EF: 45-50%    Technically difficult study - contrast was used to enhance endocardial definition due to   suboptimal image quality.    Mildly abnormal left ventricular ejection fraction estimated at 45-50%.    Inferior and inferior septal wall motion abnormality.      Physical Examination  Review of Systems   Vitals: /60 (BP Location: Right arm, Patient Position: Sitting, Cuff Size: Adult Regular)   Pulse 69   Resp 16   Wt 66.7 kg (147 lb)   LMP  (LMP Unknown)   BMI 26.89 kg/m    BMI= Body mass index is 26.89 kg/m .  Wt Readings from Last 3 Encounters:   02/25/25 66.7 kg (147 lb)   02/18/25 65.8 kg (145 lb)   01/14/25 66 kg (145 lb 8 oz)        Pleasant   ENT/Mouth: membranes moist, no oral lesions or bleeding gums.      EYES:  no scleral icterus, normal conjunctivae       Chest/Lungs:   lungs are clear to auscultation, no rales   Cardiovascular:   Regular. Normal first and second heart sounds with no murmurs,  rubs, or gallops; the carotid, radial and posterior tibial pulses are intact, Jugular venous pressure noraml, no edema bilaterally        Extremities: no cyanosis or clubbing   Skin: no xanthelasma, warm.    Neurologic: normal  bilateral, no tremors     Psychiatric: alert and oriented x3, calm        Please refer above for cardiac ROS details.        Medical History  Surgical History Family History Social History   Past Medical History:   Diagnosis Date    Coronary atherosclerosis of unspecified type of vessel, native or graft     Coronary artery disease    Hearing aid worn     Infection due to 2019 novel coronavirus 08/29/2023    Open-angle glaucoma, unspecified(365.10)     Type II or unspecified type diabetes mellitus without mention of complication, not stated as uncontrolled     Diabetes mellitus         [place] passed 8/2024     Past Surgical History:   Procedure Laterality Date    AS CABG, ARTERY-VEIN, FOUR  12/18/2023    HC DILATION/CURETTAGE DIAG/THER NON OB  [age]    D & C    HC REMOVAL GALLBLADDER  01/01/1968    HC REMOVAL OF TONSILS,<10 Y/O      HC REMV CATARACT EXTRACAP,INSERT LENS, W/O ECP  12/20/2006    right eye    HC REMV CATARACT EXTRACAP,INSERT LENS, W/O ECP  07/19/2013    left eye    ZZC APPENDECTOMY  01/01/1960    ZZC GLAUCOMA SURG,TRABECU AB EXTERNO  03/28/2006    right eye    ZZC TOTAL ABDOM HYSTERECTOMY  01/01/1975     Family History   Problem Relation Age of Onset    Diabetes Mother         diabetes        Social History     Socioeconomic History    Marital status:      Spouse name: Not on file    Number of children: Not on file    Years of education: Not on file    Highest education level: Not on file   Occupational History    Not on file   Tobacco Use    Smoking status: Former     Types: Cigarettes     Passive exposure: Never    Smokeless tobacco: Never    Tobacco comments:     12/00 aprox 30 pack years   Vaping Use    Vaping status: Never Used   Substance and Sexual Activity     Alcohol use: No    Drug use: No    Sexual activity: Yes     Comment: monogomous with  39 years   Other Topics Concern     Service Not Asked    Blood Transfusions No     Comment: hep c neg    Caffeine Concern Not Asked    Occupational Exposure Not Asked    Hobby Hazards Not Asked    Sleep Concern Not Asked    Stress Concern Not Asked    Weight Concern Not Asked    Special Diet Not Asked    Back Care Not Asked    Exercise Not Asked    Bike Helmet Not Asked    Seat Belt Not Asked    Self-Exams Not Asked   Social History Narrative    Not on file     Social Drivers of Health     Financial Resource Strain: Low Risk  (2/23/2024)    Financial Resource Strain     Within the past 12 months, have you or your family members you live with been unable to get utilities (heat, electricity) when it was really needed?: No   Food Insecurity: No Food Insecurity (10/2/2024)    Received from [facility]    Food Insecurity     Do you worry your food will run out before you are able to buy more?: 1   Transportation Needs: No Transportation Needs (10/2/2024)    Received from [facility]    Transportation Needs     Does lack of transportation keep you from medical appointments?: 1     Does lack of transportation keep you from work, meetings or getting things that you need?: 1   Physical Activity: Not on file   Stress: No Stress Concern Present (2/23/2024)    [employer] - Occupational Stress Questionnaire     Feeling of Stress : Only a little   Social Connections: Socially Integrated (10/2/2024)    Received from [facility]    Social Connections     Do you often feel lonely or isolated from those around you?: 0   Interpersonal Safety: Low Risk  (10/7/2024)    Interpersonal Safety     Do you feel physically and emotionally safe where you currently live?: Yes     Within the past 12 months, have you  been hit, slapped, kicked or otherwise physically hurt by someone?: No     Within the past 12 months, have you been humiliated or emotionally abused in other ways by your partner or ex-partner?: No   Housing Stability: Low Risk  (10/2/2024)    Received from [facility]    Housing Stability     What is your housing situation today?: 1           Medications  Allergies   Current Outpatient Medications   Medication Sig Dispense Refill    amLODIPine (NORVASC) 5 MG tablet Take 1 tablet (5 mg) by mouth daily. 90 tablet 3    aspirin 81 MG EC tablet Take 2 tablets (162 mg) by mouth daily      chlorthalidone (HYGROTON) 25 MG tablet Take 1 tablet (25 mg) by mouth daily. 90 tablet 3    ezetimibe (ZETIA) 10 MG tablet Take 1 tablet (10 mg) by mouth daily. 90 tablet 3    insulin glargine U-300 (TOUJEO MAX SOLOSTAR) 300 UNIT/ML (2 units dial) pen INJECT 36 UNITS SUBCUTANEOUSLY ONCE DAILY (Patient taking differently: 20 Units.) 12 mL 3    NOVOLOG FLEXPEN 100 UNIT/ML soln Inject 15-20 Units Subcutaneous 3 times daily (with meals) INJECT 12 TO 15 UNITS SUBCUTANEOUSLY THREE TIMES DAILY BEFORE MEAL(S) 30 mL 3    pantoprazole (PROTONIX) 40 MG EC tablet Take 1 tablet by mouth once daily 90 tablet 2    rosuvastatin (CRESTOR) 5 MG tablet Take 1 tablet (5 mg) by mouth daily. 90 tablet 3       Allergies   Allergen Reactions    Rosiglitazone Maleate Shortness Of Breath     avandia- breathing trouble    Amlodipine     Diphenhydramine Swelling    Irbesartan-Hydrochlorothiazide      Other reaction(s): Angioedema    Lisinopril      Other reaction(s): Angioedema    Macrolides And Ketolides      breathing trouble (zithromax)    Metformin Other (See Comments)    Moxifloxacin Other (See Comments)    Nsaids      naprosyn-hives    Nuts Itching    Penicillins      hives    Pioglitazone Hydrochloride Other (See Comments) and Swelling     actos- gi upset, cough    Prednisone      hives    Sulfa Antibiotics      hives      "     Lab Results    Chemistry/lipid CBC Cardiac Enzymes/BNP/TSH/INR   Recent Labs   Lab Test 03/05/24  1137 10/21/22  0809 08/07/21  0910   CHOL 213*   < > 172   HDL 59   < > 60   *   < > 91   TRIG 152*   < > 118   CHOLHDLRATIO  --   --  2.9    < > = values in this interval not displayed.     Recent Labs   Lab Test 03/05/24  1137 10/21/22  0809 08/07/21  0910   * 85 91     Recent Labs   Lab Test 01/24/24  1333      POTASSIUM 4.1   CHLORIDE 97*   CO2 26   *   BUN 37.3*   CR 2.26*   GFRESTIMATED 21*   JACK 9.6     Recent Labs   Lab Test 01/24/24  1333 12/29/23  1144 06/15/23  0802   CR 2.26* 2.32* 1.63*     Recent Labs   Lab Test 03/05/24  1137 06/15/23  0802 10/21/22  0809   A1C 8.6* 7.9* 8.1*     Lab Results   Component Value Date    A1C 8.9 02/18/2025    A1C 7.8 08/06/2024    A1C 8.6 03/05/2024    A1C 7.9 06/15/2023    A1C 8.1 10/21/2022    A1C 9.5 08/07/2001    A1C 9.2 05/10/2001     Recent Labs   Lab Test 02/18/25  1142 03/05/24  1137 10/21/22  0809 08/07/21  0910 07/16/20  1005   CHOL 205* 213*   < > 172 161   HDL 90 59   < > 60 64   LDL 93 124*   < > 91 80   TRIG 109 152*   < > 118 83   CHOLHDLRATIO  --   --   --  2.9 2.5    < > = values in this interval not displayed.          Recent Labs   Lab Test 03/05/24  1137 01/24/24  1333   WBC  --  10.9   HGB 10.7* 10.5*   HCT  --  33.7*   MCV  --  90   PLT  --  283     Recent Labs   Lab Test 03/05/24  1137 01/24/24  1333 06/15/23  0802   HGB 10.7* 10.5* 12.0    No results for input(s): \"TROPONINI\" in the last 40096 hours.  No results for input(s): \"BNP\", \"NTBNPI\", \"NTBNP\" in the last 54093 hours.  No results for input(s): \"TSH\" in the last 99066 hours.  Recent Labs   Lab Test 04/02/24  1312 03/19/24  1254 03/05/24  1137   INR 2.0* 2.2* 2.5*        Lucio Espinoza, DO        The longitudinal plan of care for the diagnosis(es)/condition(s) as documented were addressed during this visit. Due to the added complexity in care, I will continue " to support Yoselyn in the subsequent management and with ongoing continuity of care.

## 2025-04-21 ENCOUNTER — OFFICE VISIT (OUTPATIENT)
Dept: FAMILY MEDICINE | Facility: CLINIC | Age: 80
End: 2025-04-21
Payer: COMMERCIAL

## 2025-04-21 VITALS
SYSTOLIC BLOOD PRESSURE: 130 MMHG | OXYGEN SATURATION: 98 % | HEART RATE: 60 BPM | RESPIRATION RATE: 16 BRPM | TEMPERATURE: 97.7 F | BODY MASS INDEX: 27.68 KG/M2 | WEIGHT: 150.4 LBS | DIASTOLIC BLOOD PRESSURE: 64 MMHG | HEIGHT: 62 IN

## 2025-04-21 DIAGNOSIS — H61.23 BILATERAL IMPACTED CERUMEN: Primary | ICD-10-CM

## 2025-04-21 PROCEDURE — 69209 REMOVE IMPACTED EAR WAX UNI: CPT | Mod: 50 | Performed by: NURSE PRACTITIONER

## 2025-04-21 NOTE — PROGRESS NOTES
"  Assessment & Plan     Bilateral impacted cerumen  Softening drops placed and gentle irrigation performed by support staff with successful removal of impacted cerumen.  Patient tolerated procedure well.  She is encouraged to use over-the-counter Debrox earwax softening drops just as needed and as directed when she feels fullness or impacted ear wax.    - UT REMOVAL IMPACTED CERUMEN IRRIGATION/LVG UNILAT          BMI  Estimated body mass index is 27.51 kg/m  as calculated from the following:    Height as of this encounter: 1.575 m (5' 2\").    Weight as of this encounter: 68.2 kg (150 lb 6.4 oz).             Renae Topete is a 79 year old, presenting for the following health issues:  Ear Problem (Ear cleaning poss both ears, but Lt ear seems worse)        4/21/2025     2:17 PM   Additional Questions   Roomed by RADHA Joel     Left worse than right   Had the right cleaned out by audiologist  Wears hearing aids  No pain   Denies any decreased hearing  No recent URI     History of Present Illness       Reason for visit:  Hearing    She eats 2-3 servings of fruits and vegetables daily.She consumes 1 sweetened beverage(s) daily.She exercises with enough effort to increase her heart rate 10 to 19 minutes per day.  She exercises with enough effort to increase her heart rate 4 days per week. She is missing 1 dose(s) of medications per week.                  Review of Systems  Constitutional, HEENT, cardiovascular, pulmonary, gi and gu systems are negative, except as otherwise noted.      Objective    /64 (BP Location: Right arm, Patient Position: Sitting, Cuff Size: Adult Regular)   Pulse 60   Temp 97.7  F (36.5  C) (Tympanic)   Resp 16   Ht 1.575 m (5' 2\")   Wt 68.2 kg (150 lb 6.4 oz)   LMP  (LMP Unknown)   SpO2 98%   BMI 27.51 kg/m    Body mass index is 27.51 kg/m .  Physical Exam  Constitutional:       Appearance: Normal appearance.   HENT:      Head: Normocephalic and atraumatic.      Right Ear: External " ear normal. There is impacted cerumen (partial).      Left Ear: External ear normal. There is impacted cerumen.      Mouth/Throat:      Mouth: Mucous membranes are moist.   Neurological:      Mental Status: She is alert and oriented to person, place, and time.   Psychiatric:         Mood and Affect: Mood normal.         Behavior: Behavior normal.                    Signed Electronically by: HENNA Monet CNP

## 2025-05-27 NOTE — TELEPHONE ENCOUNTER
Provider Staff:  Action required for this patient     Please see care gap opportunities below in Care Due Message.    Thanks!  [facility]     Appointments      Date Provider   Last Visit   3/17/2025 Nura Hurt MD   Next Visit   Visit date not found Nura uHrt MD      Refill Decision Note   Elinor Shearer  is requesting a refill authorization.  Brief Assessment and Rationale for Refill:  Approve     Medication Therapy Plan:  Lab(s) recommended: CBC      Pharmacist review requested: Yes   Extended chart review required: Yes   Comments:     Note composed:7:26 PM 05/26/2025            Entered by Indiana Colbert on July 29, 2019 4:18:49 PM CDT  ---------------------  From: Indiana Colbert   To: [facility]    Sent: 7/29/2019 4:18:49 PM CDT  Subject: Medication Management     ** Submitted: **  Order:atenolol-chlorthalidone (atenolol-chlorthalidone 50 mg-25 mg oral tablet)  0.5 tab(s)  Oral  daily  Qty:  45 tab(s)        Days Supply:  90        Refills:  1          Substitutions Allowed     Route To Pharmacy - [place] [id number]    Signed by Indiana Colbert  7/29/2019 4:18:00 PM    ** Submitted: **  Complete:atenolol-chlorthalidone (atenolol-chlorthalidone 50 mg-25 mg oral tablet)   Signed by Indiana Colbert  7/29/2019 4:18:00 PM    ** Not Approved:  **  atenolol-chlorthalidone (ATENOL/CHLOR [id number])  TAKE 1/2 (ONE-HALF) TABLET BY MOUTH ONCE DAILY  Qty:  45 tab(s)        Days Supply:  90        Refills:  0          Substitutions Allowed     Route To Pharmacy - [place] [id number]   Signed by Indiana Colbert          Date of last office visit and reason:  5/28/19 DM      Date of last Med Check / Px:   ^  Date of last labs pertaining to med:  BMP 1/15/19    RTC order in chart:  yes.     Per protocol will fill x6 mo.            ** Patient matched by Indiana Colbert on 7/29/2019 4:16:19 PM CDT **      ------------------------------------------  From: [facility]  To: Edgardo Luong MD  Sent: July 27, 2019 10:54:44 AM CDT  Subject: Medication Management  Due: July 28, 2019 10:54:44 AM CDT    ** On Hold Pending Signature **  Drug: atenolol-chlorthalidone (atenolol-chlorthalidone 50 mg-25 mg oral tablet)  TAKE 1/2 (ONE-HALF) TABLET BY MOUTH ONCE DAILY  Quantity: 45 tab(s)     Days Supply: 0         Refills: 0  Substitutions Allowed  Notes from Pharmacy:     Dispensed Drug: atenolol-chlorthalidone (atenolol-chlorthalidone 50 mg-25 mg oral tablet)  TAKE 1/2 (ONE-HALF) TABLET BY MOUTH ONCE DAILY  Quantity: 45 tab(s)     Days Supply: 90        Refills: 0  Substitutions  Allowed  Notes from Pharmacy:   ------------------------------------------

## 2025-07-14 ENCOUNTER — TRANSFERRED RECORDS (OUTPATIENT)
Dept: HEALTH INFORMATION MANAGEMENT | Facility: CLINIC | Age: 80
End: 2025-07-14
Payer: MEDICARE